# Patient Record
Sex: FEMALE | Race: WHITE | NOT HISPANIC OR LATINO | Employment: FULL TIME | URBAN - METROPOLITAN AREA
[De-identification: names, ages, dates, MRNs, and addresses within clinical notes are randomized per-mention and may not be internally consistent; named-entity substitution may affect disease eponyms.]

---

## 2017-04-05 ENCOUNTER — HOSPITAL ENCOUNTER (OUTPATIENT)
Dept: RADIOLOGY | Facility: OTHER | Age: 47
Discharge: HOME | End: 2017-04-05
Attending: OBSTETRICS & GYNECOLOGY
Payer: COMMERCIAL

## 2017-04-05 DIAGNOSIS — Z12.31 OTHER SCREENING MAMMOGRAM: ICD-10-CM

## 2017-04-05 PROCEDURE — 77067 SCR MAMMO BI INCL CAD: CPT | Mod: TC

## 2017-04-05 PROCEDURE — 77067 SCR MAMMO BI INCL CAD: CPT | Mod: 26,,, | Performed by: INTERNAL MEDICINE

## 2018-07-17 ENCOUNTER — HOSPITAL ENCOUNTER (OUTPATIENT)
Dept: RADIOLOGY | Facility: OTHER | Age: 48
Discharge: HOME | End: 2018-07-17
Attending: OBSTETRICS & GYNECOLOGY
Payer: COMMERCIAL

## 2018-07-17 VITALS — WEIGHT: 232 LBS | HEIGHT: 72 IN | BODY MASS INDEX: 31.42 KG/M2

## 2018-07-17 DIAGNOSIS — Z12.31 VISIT FOR SCREENING MAMMOGRAM: ICD-10-CM

## 2018-07-17 PROCEDURE — 77067 SCR MAMMO BI INCL CAD: CPT | Mod: 26,,, | Performed by: RADIOLOGY

## 2018-07-17 PROCEDURE — 77067 SCR MAMMO BI INCL CAD: CPT | Mod: TC

## 2019-03-15 ENCOUNTER — HOSPITAL ENCOUNTER (OUTPATIENT)
Dept: RADIOLOGY | Facility: OTHER | Age: 49
Discharge: HOME | End: 2019-03-15
Attending: OBSTETRICS & GYNECOLOGY
Payer: COMMERCIAL

## 2019-03-15 DIAGNOSIS — Z12.39 SCREENING BREAST EXAMINATION: ICD-10-CM

## 2019-03-15 DIAGNOSIS — Z97.5 IUD (INTRAUTERINE DEVICE) IN PLACE: ICD-10-CM

## 2019-03-15 DIAGNOSIS — R10.9 ABDOMINAL DISCOMFORT: Primary | ICD-10-CM

## 2019-03-15 DIAGNOSIS — R10.9 ABDOMINAL DISCOMFORT: ICD-10-CM

## 2019-03-15 LAB — HPV RNA, HR E6/E7, TMA: NOT DETECTED

## 2019-03-15 PROCEDURE — 76830 US PELVIS COMP WITH TRANSVAG NON-OB (XPD): ICD-10-PCS | Mod: 26,,, | Performed by: RADIOLOGY

## 2019-03-15 PROCEDURE — 76830 TRANSVAGINAL US NON-OB: CPT | Mod: 26,,, | Performed by: RADIOLOGY

## 2019-03-15 PROCEDURE — 76856 US EXAM PELVIC COMPLETE: CPT | Mod: 26,,, | Performed by: RADIOLOGY

## 2019-03-15 PROCEDURE — 76830 TRANSVAGINAL US NON-OB: CPT | Mod: TC

## 2019-03-15 PROCEDURE — 76856 US PELVIS COMP WITH TRANSVAG NON-OB (XPD): ICD-10-PCS | Mod: 26,,, | Performed by: RADIOLOGY

## 2019-07-18 ENCOUNTER — HOSPITAL ENCOUNTER (OUTPATIENT)
Dept: RADIOLOGY | Facility: OTHER | Age: 49
Discharge: HOME | End: 2019-07-18
Attending: OBSTETRICS & GYNECOLOGY
Payer: COMMERCIAL

## 2019-07-18 VITALS — HEIGHT: 72 IN | WEIGHT: 232 LBS | BODY MASS INDEX: 31.42 KG/M2

## 2019-07-18 DIAGNOSIS — Z13.820 OSTEOPOROSIS SCREENING: ICD-10-CM

## 2019-07-18 DIAGNOSIS — Z12.39 SCREENING BREAST EXAMINATION: ICD-10-CM

## 2019-07-18 DIAGNOSIS — Z12.39 SCREENING BREAST EXAMINATION: Primary | ICD-10-CM

## 2019-07-18 PROCEDURE — 77063 BREAST TOMOSYNTHESIS BI: CPT | Mod: 26,,, | Performed by: RADIOLOGY

## 2019-07-18 PROCEDURE — 77067 SCR MAMMO BI INCL CAD: CPT | Mod: 26,,, | Performed by: RADIOLOGY

## 2019-07-18 PROCEDURE — 77080 DEXA BONE DENSITY SPINE HIP: ICD-10-PCS | Mod: 26,,, | Performed by: RADIOLOGY

## 2019-07-18 PROCEDURE — 77067 SCR MAMMO BI INCL CAD: CPT | Mod: TC

## 2019-07-18 PROCEDURE — 77067 MAMMO DIGITAL SCREENING BILAT WITH TOMOSYNTHESIS_CAD: ICD-10-PCS | Mod: 26,,, | Performed by: RADIOLOGY

## 2019-07-18 PROCEDURE — 77063 MAMMO DIGITAL SCREENING BILAT WITH TOMOSYNTHESIS_CAD: ICD-10-PCS | Mod: 26,,, | Performed by: RADIOLOGY

## 2019-07-18 PROCEDURE — 77080 DXA BONE DENSITY AXIAL: CPT | Mod: TC

## 2019-07-18 PROCEDURE — 77080 DXA BONE DENSITY AXIAL: CPT | Mod: 26,,, | Performed by: RADIOLOGY

## 2019-10-04 ENCOUNTER — OFFICE VISIT (OUTPATIENT)
Dept: PRIMARY CARE CLINIC | Facility: CLINIC | Age: 49
End: 2019-10-04
Payer: COMMERCIAL

## 2019-10-04 VITALS
SYSTOLIC BLOOD PRESSURE: 126 MMHG | DIASTOLIC BLOOD PRESSURE: 86 MMHG | WEIGHT: 249.31 LBS | HEIGHT: 72 IN | TEMPERATURE: 99 F | BODY MASS INDEX: 33.77 KG/M2 | HEART RATE: 83 BPM | OXYGEN SATURATION: 97 %

## 2019-10-04 DIAGNOSIS — Z23 NEED FOR PROPHYLACTIC VACCINATION AND INOCULATION AGAINST INFLUENZA: ICD-10-CM

## 2019-10-04 DIAGNOSIS — E55.9 VITAMIN D DEFICIENCY: ICD-10-CM

## 2019-10-04 DIAGNOSIS — I10 ESSENTIAL HYPERTENSION: Primary | ICD-10-CM

## 2019-10-04 DIAGNOSIS — E78.2 MIXED HYPERLIPIDEMIA: ICD-10-CM

## 2019-10-04 DIAGNOSIS — E04.1 THYROID NODULE: ICD-10-CM

## 2019-10-04 DIAGNOSIS — Z00.00 ANNUAL PHYSICAL EXAM: ICD-10-CM

## 2019-10-04 PROCEDURE — 90686 FLU VACCINE (QUAD) GREATER THAN OR EQUAL TO 3YO PRESERVATIVE FREE IM: ICD-10-PCS | Mod: S$GLB,,, | Performed by: INTERNAL MEDICINE

## 2019-10-04 PROCEDURE — 99999 PR PBB SHADOW E&M-EST. PATIENT-LVL III: CPT | Mod: PBBFAC,,, | Performed by: INTERNAL MEDICINE

## 2019-10-04 PROCEDURE — 99396 PREV VISIT EST AGE 40-64: CPT | Mod: 25,S$GLB,, | Performed by: INTERNAL MEDICINE

## 2019-10-04 PROCEDURE — 90715 TDAP VACCINE GREATER THAN OR EQUAL TO 7YO IM: ICD-10-PCS | Mod: S$GLB,,, | Performed by: INTERNAL MEDICINE

## 2019-10-04 PROCEDURE — 90471 IMMUNIZATION ADMIN: CPT | Mod: S$GLB,,, | Performed by: INTERNAL MEDICINE

## 2019-10-04 PROCEDURE — 90472 IMMUNIZATION ADMIN EACH ADD: CPT | Mod: S$GLB,,, | Performed by: INTERNAL MEDICINE

## 2019-10-04 PROCEDURE — 99999 PR PBB SHADOW E&M-EST. PATIENT-LVL III: ICD-10-PCS | Mod: PBBFAC,,, | Performed by: INTERNAL MEDICINE

## 2019-10-04 PROCEDURE — 90686 IIV4 VACC NO PRSV 0.5 ML IM: CPT | Mod: S$GLB,,, | Performed by: INTERNAL MEDICINE

## 2019-10-04 PROCEDURE — 99396 PR PREVENTIVE VISIT,EST,40-64: ICD-10-PCS | Mod: 25,S$GLB,, | Performed by: INTERNAL MEDICINE

## 2019-10-04 PROCEDURE — 90471 FLU VACCINE (QUAD) GREATER THAN OR EQUAL TO 3YO PRESERVATIVE FREE IM: ICD-10-PCS | Mod: S$GLB,,, | Performed by: INTERNAL MEDICINE

## 2019-10-04 PROCEDURE — 90715 TDAP VACCINE 7 YRS/> IM: CPT | Mod: S$GLB,,, | Performed by: INTERNAL MEDICINE

## 2019-10-04 PROCEDURE — 90472 TDAP VACCINE GREATER THAN OR EQUAL TO 7YO IM: ICD-10-PCS | Mod: S$GLB,,, | Performed by: INTERNAL MEDICINE

## 2019-10-04 RX ORDER — ACETAMINOPHEN 160 MG/5ML
200 SUSPENSION, ORAL (FINAL DOSE FORM) ORAL DAILY
COMMUNITY
End: 2022-10-13 | Stop reason: CLARIF

## 2019-10-04 RX ORDER — GLUCOSAMINE/CHONDR SU A SOD 750-600 MG
TABLET ORAL
COMMUNITY
End: 2020-12-14

## 2019-10-04 RX ORDER — VITAMIN B COMPLEX
1 CAPSULE ORAL DAILY
COMMUNITY
End: 2022-10-13 | Stop reason: CLARIF

## 2019-10-04 RX ORDER — LISINOPRIL 20 MG/1
TABLET ORAL
Refills: 2 | COMMUNITY
Start: 2019-07-03 | End: 2019-10-09 | Stop reason: SDUPTHER

## 2019-10-04 NOTE — PROGRESS NOTES
[facility] Note    Chief Complaint      Chief Complaint   Patient presents with    Annual Exam       History of Present Illness      Nell Sanches is a 49 y.o. female with chronic conditions of HTN, HLD, anxiety, thyroid nodule, vit d deficiency who presents today for: re-establish care from  and annual preventative visit.    HTN: BP at [facility] on lisinopril.  HLD: Controlled with diet.  Update labs.    Anxiety: Controlled without medications.  Stretching and exercising.   Thyroid nodule: Due for repeat ultrasound.    Diet: Prepares own food mostly.  Limiting fatty foods and carbs.  Drinks plenty water.  Exercise: Walks regularly but wants to start more resistance.    Denies drinking and driving, drinking more than 4 drinks on occasion, drug use.       Past Medical History:  Past Medical History:   Diagnosis Date    Abnormal weight gain 10/2/2012    Other and unspecified hyperlipidemia 10/2/2012    Unspecified vitamin D deficiency 10/2/2012       Past Surgical History:   has a past surgical history that includes Breast biopsy (Right, 2012).    Family History:  family history includes Colon cancer (age of onset: 70) in her maternal grandmother.     Social History:  Social History     Tobacco Use    Smoking status: Never Smoker   Substance Use Topics    Alcohol use: Not on file    Drug use: Not on file       Review of Systems   Constitutional: Negative for chills, fever and malaise/fatigue.   Respiratory: Negative for shortness of breath.    Cardiovascular: Negative for chest pain.   Gastrointestinal: Negative for constipation, diarrhea, nausea and vomiting.   Skin: Negative for rash.   Neurological: Negative for weakness.        Medications:  Outpatient Encounter Medications as of 10/4/2019   Medication Sig Note Dispense Refill    a-cysteine/arg zinc/glut/mv-mn (L GLUTAMINE-N ACET-ARG-VIT-MIN ORAL) Take 500 mg by mouth.       b complex vitamins capsule Take 1 capsule by mouth once  daily.       biotin 2,500 mcg Cap Take by mouth.       Ca/D3/mag ox/zinc//nancy/bor (CALCIUM 600-D3 PLUS ORAL) Take by mouth.       coenzyme Q10 200 mg capsule Take 200 mg by mouth once daily.       docosahexanoic acid/epa (FISH OIL ORAL) Take by mouth.       levocarnitine HCl (ACETYL-L-CARNITINE MISC) 100 mg by Misc.(Non-Drug; Combo Route) route.       lisinopril (PRINIVIL,ZESTRIL) 20 MG tablet TK 1 T PO D   2    vit A/vit C/vit E/zinc/copper (ICAPS AREDS ORAL) Take by mouth.       ciprofloxacin (CIPRO) 250 MG tablet        ergocalciferol (ERGOCALCIFEROL) 50,000 unit Cap TAKE 1 CAPSULE BY MOUTH TWICE A WEEK  25 capsule 3    lorazepam (ATIVAN) 0.5 MG tablet TAKE 1/2 - 1 TABLET BY MOUTH EVERY DAY AS NEEDED (Patient not taking: Reported on 10/4/2019)  30 tablet 1    phenazopyridine (PYRIDIUM) 200 MG tablet        phentermine (ADIPEX-P) 37.5 mg tablet TAKE 1/2 - 1 TABLET BY MOUTH EVERY MORNING AS DIRECTED (Patient not taking: Reported on 10/4/2019)  30 tablet 1    topiramate (TOPAMAX) 50 MG tablet TAKE 1 TABLET BY MOUTH EVERY NIGHT AT BEDTIME (Patient not taking: Reported on 10/4/2019)  90 tablet 3    URIBEL 118-10-40.8-36 mg Cap        [DISCONTINUED] OPW TEST CLAIM - DO NOT FILL Inject 0.5 tablets into the muscle once. OPW test claim. Do not fill. for 1 dose 10/4/2019: test 1 tablet 0    [DISCONTINUED] OPW TEST CLAIM - DO NOT FILL Inject 0.5 tablets into the muscle once. OPW test claim. Do not fill. for 1 dose 10/4/2019: test 1 tablet 0     No facility-administered encounter medications on file as of 10/4/2019.        Allergies:  Review of patient's allergies indicates:   Allergen Reactions    Sulfa (sulfonamide antibiotics) Hives       Health Maintenance:  Immunization History   Administered Date(s) Administered    Influenza - Trivalent - PF (ADULT) 10/04/2012      Health Maintenance   Topic Date Due    TETANUS VACCINE  09/09/1987    Lipid Panel  04/11/2014    Pap Smear with HPV Cotest   03/07/2019    Colonoscopy  09/09/2019    Mammogram  07/18/2020      Flu shot due.  TdAP due today.    Mammogram UTD.  DEXA UTD 2019.  Dr. Gustafson.  PAP smear UTD.   Cscope 2019 Dr. Salgado, polyp, 5 yr interval.    Physical Exam      Vital Signs  Temp: 98.5 °F (36.9 °C)  Temp src: Oral  Pulse: 83  SpO2: 97 %  BP: 126/86  BP Location: Left arm  Patient Position: Sitting  Height and Weight  Height: 6' (182.9 cm)  Weight: 113.1 kg (249 lb 5.4 oz)  BSA (Calculated - sq m): 2.4 sq meters  BMI (Calculated): 33.9  Weight in (lb) to have BMI = 25: 183.9]    Physical Exam   Constitutional: She appears well-developed and well-nourished.   HENT:   Head: Normocephalic and atraumatic.   Right Ear: External ear normal.   Left Ear: External ear normal.   Mouth/Throat: Oropharynx is clear and moist.   Eyes: Pupils are equal, round, and reactive to light. Conjunctivae and EOM are normal.   Neck: Carotid bruit is not present.   Cardiovascular: Normal rate, regular rhythm, normal heart sounds and intact distal pulses.   No murmur heard.  Pulmonary/Chest: Effort normal and breath sounds normal. She has no wheezes. She has no rales.   Abdominal: Soft. Bowel sounds are normal. She exhibits no distension. There is no hepatosplenomegaly. There is no tenderness.   Musculoskeletal: She exhibits no edema.   Vitals reviewed.       Laboratory:  CBC:      CMP:        Invalid input(s): CREATININ  URINALYSIS:       LIPIDS:      TSH:      A1C:        Assessment/Plan     Nell Sanches is a 49 y.o.female with:    1. Annual physical exam  - CBC auto differential; Future  - Comprehensive metabolic panel; Future  - Lipid panel; Future  - TSH; Future  - T4, free; Future    2. Essential hypertension  - CBC auto differential; Future  - Comprehensive metabolic panel; Future  - Lipid panel; Future  - TSH; Future  - T4, free; Future    3. Mixed hyperlipidemia  - CBC auto differential; Future  - Comprehensive metabolic panel; Future  - Lipid panel;  Future  - TSH; Future  - T4, free; Future    4. Vitamin D deficiency    5. Thyroid nodule  - US Soft Tissue Head Neck Thyroid; Future  - US Soft Tissue Head Neck Thyroid       Chronic conditions status updated as per HPI.  Other than changes above, cont current medications and maintain follow up with specialists.  Return to clinic in 6 months.    Juanito Cabral MD  [facility]                Flu consent signed by patient. Flu vaccine administered.

## 2019-10-09 RX ORDER — LISINOPRIL 20 MG/1
20 TABLET ORAL DAILY
Qty: 30 TABLET | Refills: 3 | Status: SHIPPED | OUTPATIENT
Start: 2019-10-09 | End: 2020-02-04 | Stop reason: SDUPTHER

## 2019-10-09 NOTE — TELEPHONE ENCOUNTER
----- Message from Susannah Simeon sent at 10/9/2019  2:30 PM CDT -----  Contact: [phone]  Type: Rx    Name of medication(s): lisinopril (PRINIVIL,ZESTRIL) 20 MG tablet    Is this a refill? New rx? New     Pharmacy Name, Phone, & Location[id number] [address] [place] [address] AT [facility]   [phone] (Phone)  [phone] (Fax)        Comments:please advise, thanks

## 2019-10-21 ENCOUNTER — PATIENT OUTREACH (OUTPATIENT)
Dept: ADMINISTRATIVE | Facility: HOSPITAL | Age: 49
End: 2019-10-21

## 2019-10-30 LAB
ALBUMIN SERPL-MCNC: 4 G/DL (ref 3.6–5.1)
ALBUMIN/GLOB SERPL: 1.4 (CALC) (ref 1–2.5)
ALP SERPL-CCNC: 51 U/L (ref 33–130)
ALT SERPL-CCNC: 15 U/L (ref 6–29)
AST SERPL-CCNC: 16 U/L (ref 10–35)
BASOPHILS # BLD AUTO: 38 CELLS/UL (ref 0–200)
BASOPHILS NFR BLD AUTO: 0.6 %
BILIRUB SERPL-MCNC: 0.8 MG/DL (ref 0.2–1.2)
BUN SERPL-MCNC: 14 MG/DL (ref 7–25)
BUN/CREAT SERPL: NORMAL (CALC) (ref 6–22)
CALCIUM SERPL-MCNC: 9.3 MG/DL (ref 8.6–10.4)
CHLORIDE SERPL-SCNC: 104 MMOL/L (ref 98–110)
CHOLEST SERPL-MCNC: 165 MG/DL
CHOLEST/HDLC SERPL: 3.4 (CALC)
CO2 SERPL-SCNC: 30 MMOL/L (ref 20–32)
CREAT SERPL-MCNC: 0.77 MG/DL (ref 0.5–1.05)
EOSINOPHIL # BLD AUTO: 170 CELLS/UL (ref 15–500)
EOSINOPHIL NFR BLD AUTO: 2.7 %
ERYTHROCYTE [DISTWIDTH] IN BLOOD BY AUTOMATED COUNT: 12.2 % (ref 11–15)
GFRSERPLBLD MDRD-ARVRAT: 90 ML/MIN/1.73M2
GLOBULIN SER CALC-MCNC: 2.8 G/DL (CALC) (ref 1.9–3.7)
GLUCOSE SERPL-MCNC: 86 MG/DL (ref 65–99)
HCT VFR BLD AUTO: 41.2 % (ref 35–45)
HDLC SERPL-MCNC: 49 MG/DL
HGB BLD-MCNC: 14 G/DL (ref 11.7–15.5)
LDLC SERPL CALC-MCNC: 97 MG/DL (CALC)
LYMPHOCYTES # BLD AUTO: 1840 CELLS/UL (ref 850–3900)
LYMPHOCYTES NFR BLD AUTO: 29.2 %
MCH RBC QN AUTO: 30 PG (ref 27–33)
MCHC RBC AUTO-ENTMCNC: 34 G/DL (ref 32–36)
MCV RBC AUTO: 88.4 FL (ref 80–100)
MONOCYTES # BLD AUTO: 510 CELLS/UL (ref 200–950)
MONOCYTES NFR BLD AUTO: 8.1 %
NEUTROPHILS # BLD AUTO: 3742 CELLS/UL (ref 1500–7800)
NEUTROPHILS NFR BLD AUTO: 59.4 %
NONHDLC SERPL-MCNC: 116 MG/DL (CALC)
PLATELET # BLD AUTO: 304 THOUSAND/UL (ref 140–400)
PMV BLD REES-ECKER: 10.7 FL (ref 7.5–12.5)
POTASSIUM SERPL-SCNC: 4.7 MMOL/L (ref 3.5–5.3)
PROT SERPL-MCNC: 6.8 G/DL (ref 6.1–8.1)
RBC # BLD AUTO: 4.66 MILLION/UL (ref 3.8–5.1)
SODIUM SERPL-SCNC: 140 MMOL/L (ref 135–146)
T4 FREE SERPL-MCNC: 1.2 NG/DL (ref 0.8–1.8)
TRIGL SERPL-MCNC: 91 MG/DL
TSH SERPL-ACNC: 2.75 MIU/L
WBC # BLD AUTO: 6.3 THOUSAND/UL (ref 3.8–10.8)

## 2020-02-04 RX ORDER — LISINOPRIL 20 MG/1
20 TABLET ORAL DAILY
Qty: 90 TABLET | Refills: 3 | Status: SHIPPED | OUTPATIENT
Start: 2020-02-04 | End: 2021-01-20 | Stop reason: SDUPTHER

## 2020-02-04 NOTE — TELEPHONE ENCOUNTER
Patient need refills on Lisinopril sent to [facility], Also said you were going to speak to someone you knew regarding her Gluten???, Also looking for U/S results scanned in

## 2020-09-04 DIAGNOSIS — Z12.39 BREAST CANCER SCREENING: ICD-10-CM

## 2020-10-12 ENCOUNTER — TELEPHONE (OUTPATIENT)
Dept: FAMILY MEDICINE | Facility: CLINIC | Age: 50
End: 2020-10-12

## 2020-10-30 ENCOUNTER — TELEPHONE (OUTPATIENT)
Dept: ADMINISTRATIVE | Facility: HOSPITAL | Age: 50
End: 2020-10-30

## 2020-10-30 ENCOUNTER — OFFICE VISIT (OUTPATIENT)
Dept: PRIMARY CARE CLINIC | Facility: CLINIC | Age: 50
End: 2020-10-30
Payer: COMMERCIAL

## 2020-10-30 VITALS
WEIGHT: 257.94 LBS | OXYGEN SATURATION: 99 % | BODY MASS INDEX: 34.94 KG/M2 | DIASTOLIC BLOOD PRESSURE: 88 MMHG | SYSTOLIC BLOOD PRESSURE: 130 MMHG | HEIGHT: 72 IN | HEART RATE: 70 BPM

## 2020-10-30 DIAGNOSIS — E78.2 MIXED HYPERLIPIDEMIA: ICD-10-CM

## 2020-10-30 DIAGNOSIS — E04.1 THYROID NODULE: ICD-10-CM

## 2020-10-30 DIAGNOSIS — Z11.4 SCREENING FOR HIV (HUMAN IMMUNODEFICIENCY VIRUS): ICD-10-CM

## 2020-10-30 DIAGNOSIS — I10 ESSENTIAL HYPERTENSION: ICD-10-CM

## 2020-10-30 DIAGNOSIS — Z11.59 SCREENING FOR VIRAL DISEASE: ICD-10-CM

## 2020-10-30 DIAGNOSIS — Z00.00 ANNUAL PHYSICAL EXAM: Primary | ICD-10-CM

## 2020-10-30 DIAGNOSIS — E55.9 VITAMIN D DEFICIENCY: ICD-10-CM

## 2020-10-30 DIAGNOSIS — R73.01 IMPAIRED FASTING GLUCOSE: ICD-10-CM

## 2020-10-30 PROCEDURE — 99396 PREV VISIT EST AGE 40-64: CPT | Mod: S$GLB,,, | Performed by: INTERNAL MEDICINE

## 2020-10-30 PROCEDURE — 3079F DIAST BP 80-89 MM HG: CPT | Mod: CPTII,S$GLB,, | Performed by: INTERNAL MEDICINE

## 2020-10-30 PROCEDURE — 99396 PR PREVENTIVE VISIT,EST,40-64: ICD-10-PCS | Mod: S$GLB,,, | Performed by: INTERNAL MEDICINE

## 2020-10-30 PROCEDURE — 3008F BODY MASS INDEX DOCD: CPT | Mod: CPTII,S$GLB,, | Performed by: INTERNAL MEDICINE

## 2020-10-30 PROCEDURE — 3075F SYST BP GE 130 - 139MM HG: CPT | Mod: CPTII,S$GLB,, | Performed by: INTERNAL MEDICINE

## 2020-10-30 PROCEDURE — 99999 PR PBB SHADOW E&M-EST. PATIENT-LVL III: ICD-10-PCS | Mod: PBBFAC,,, | Performed by: INTERNAL MEDICINE

## 2020-10-30 PROCEDURE — 3079F PR MOST RECENT DIASTOLIC BLOOD PRESSURE 80-89 MM HG: ICD-10-PCS | Mod: CPTII,S$GLB,, | Performed by: INTERNAL MEDICINE

## 2020-10-30 PROCEDURE — 3008F PR BODY MASS INDEX (BMI) DOCUMENTED: ICD-10-PCS | Mod: CPTII,S$GLB,, | Performed by: INTERNAL MEDICINE

## 2020-10-30 PROCEDURE — 3075F PR MOST RECENT SYSTOLIC BLOOD PRESS GE 130-139MM HG: ICD-10-PCS | Mod: CPTII,S$GLB,, | Performed by: INTERNAL MEDICINE

## 2020-10-30 PROCEDURE — 99999 PR PBB SHADOW E&M-EST. PATIENT-LVL III: CPT | Mod: PBBFAC,,, | Performed by: INTERNAL MEDICINE

## 2020-10-30 NOTE — PROGRESS NOTES
[facility] Note    Chief Complaint      Chief Complaint   Patient presents with    Annual Exam       History of Present Illness      Nell Sanches is a 50 y.o. female with chronic conditions of HTN, HLD, anxiety, thyroid nodule, vit d deficiency who presents today for: annual preventative visit.  HTN: BP at goal on lisinopril.  HLD: Controlled with diet.  Update labs.    Anxiety: Controlled without medications.  Stretching and exercising.   Thyroid nodule: Due for repeat ultrasound.    Diet: Prepares own food mostly.  Limiting fatty foods and carbs.  Drinks plenty water.  Exercise: Walks regularly but wants to start more resistance.    Denies drinking and driving, drinking more than 4 drinks on occasion, drug use.   Flu shot 2020.  TdAP 2020    Mammogram UTD.  DEXA UTD 2019.  Dr. Gustafson.  PAP smear UTD.   Cscope 2019 Dr. Salgado, polyp, 5 yr interval.    Past Medical History:  Past Medical History:   Diagnosis Date    Abnormal weight gain 10/2/2012    Other and unspecified hyperlipidemia 10/2/2012    Unspecified vitamin D deficiency 10/2/2012       Past Surgical History:   has a past surgical history that includes Breast biopsy (Right, 2012).    Family History:  family history includes Colon cancer (age of onset: 70) in her maternal grandmother.     Social History:  Social History     Tobacco Use    Smoking status: Never Smoker    Smokeless tobacco: Never Used   Substance Use Topics    Alcohol use: Not on file    Drug use: Not on file       I personally reviewed all past medical, surgical, social and family history.    Review of Systems   Constitutional: Negative for chills, fever and malaise/fatigue.   Respiratory: Negative for shortness of breath.    Cardiovascular: Negative for chest pain.   Gastrointestinal: Negative for constipation, diarrhea, nausea and vomiting.   Skin: Negative for rash.   Neurological: Negative for weakness.   All other systems reviewed and are  negative.       Medications:  Outpatient Encounter Medications as of 10/30/2020   Medication Sig Dispense Refill    a-cysteine/arg zinc/glut/mv-mn (L GLUTAMINE-N ACET-ARG-VIT-MIN ORAL) Take 500 mg by mouth.      b complex vitamins capsule Take 1 capsule by mouth once daily.      biotin 2,500 mcg Cap Take by mouth.      Ca/D3/mag ox/zinc//nancy/bor (CALCIUM 600-D3 PLUS ORAL) Take by mouth.      coenzyme Q10 200 mg capsule Take 200 mg by mouth once daily.      docosahexanoic acid/epa (FISH OIL ORAL) Take by mouth.      lisinopril (PRINIVIL,ZESTRIL) 20 MG tablet Take 1 tablet (20 mg total) by mouth once daily. 90 tablet 3    lorazepam (ATIVAN) 0.5 MG tablet TAKE 1/2 - 1 TABLET BY MOUTH EVERY DAY AS NEEDED 30 tablet 1    vit A/vit C/vit E/zinc/copper (ICAPS AREDS ORAL) Take by mouth.      phentermine (ADIPEX-P) 37.5 mg tablet TAKE 1/2 - 1 TABLET BY MOUTH EVERY MORNING AS DIRECTED (Patient not taking: Reported on 10/4/2019) 30 tablet 1    [DISCONTINUED] ciprofloxacin (CIPRO) 250 MG tablet       [DISCONTINUED] ergocalciferol (ERGOCALCIFEROL) 50,000 unit Cap TAKE 1 CAPSULE BY MOUTH TWICE A WEEK 25 capsule 3    [DISCONTINUED] levocarnitine HCl (ACETYL-L-CARNITINE MISC) 100 mg by Misc.(Non-Drug; Combo Route) route.      [DISCONTINUED] phenazopyridine (PYRIDIUM) 200 MG tablet       [DISCONTINUED] topiramate (TOPAMAX) 50 MG tablet TAKE 1 TABLET BY MOUTH EVERY NIGHT AT BEDTIME (Patient not taking: Reported on 10/4/2019) 90 tablet 3    [DISCONTINUED] URIBEL 118-10-40.8-36 mg Cap        No facility-administered encounter medications on file as of 10/30/2020.        Allergies:  Review of patient's allergies indicates:   Allergen Reactions    Sulfa (sulfonamide antibiotics) Hives       Health Maintenance:  Immunization History   Administered Date(s) Administered    Influenza 10/04/2012    Influenza - Quadrivalent - MDCK - PF 10/10/2018    Influenza - Quadrivalent - PF *Preferred* (6 months and older)  10/04/2019, 10/08/2020    Influenza - Trivalent - PF (ADULT) 10/04/2012    Influenza Split 12/06/2017    Tdap 10/04/2019      Health Maintenance   Topic Date Due    Hepatitis C Screening  1970    Mammogram  07/18/2020    Lipid Panel  10/29/2020    TETANUS VACCINE  10/04/2029        Physical Exam      Vital Signs  Pulse: 70  SpO2: 99 %  BP: 130/88  BP Location: Right arm  Height and Weight  Height: 6' (182.9 cm)  Weight: 117 kg (257 lb 15 oz)  BSA (Calculated - sq m): 2.44 sq meters  BMI (Calculated): 35  Weight in (lb) to have BMI = 25: 183.9]    Physical Exam  Vitals signs reviewed.   Constitutional:       Appearance: She is well-developed.   HENT:      Head: Normocephalic and atraumatic.      Right Ear: External ear normal.      Left Ear: External ear normal.   Eyes:      Conjunctiva/sclera: Conjunctivae normal.      Pupils: Pupils are equal, round, and reactive to light.   Neck:      Vascular: No carotid bruit.   Cardiovascular:      Rate and Rhythm: Normal rate and regular rhythm.      Heart sounds: Normal heart sounds. No murmur.   Pulmonary:      Effort: Pulmonary effort is normal.      Breath sounds: Normal breath sounds. No wheezing or rales.   Abdominal:      General: Bowel sounds are normal. There is no distension.      Palpations: Abdomen is soft.      Tenderness: There is no abdominal tenderness.          Laboratory:  CBC:  Recent Labs   Lab 10/29/19  0742   WBC 6.3   RBC 4.66   Hemoglobin 14.0   Hematocrit 41.2   Platelets 304   MCV 88.4   MCH 30.0   MCHC 34.0     CMP:  Recent Labs   Lab 10/29/19  0742   Glucose 86   Calcium 9.3   Albumin 4.0   Total Protein 6.8   Sodium 140   Potassium 4.7   CO2 30   Chloride 104   BUN 14   Alkaline Phosphatase 51   ALT 15   AST 16   Total Bilirubin 0.8     URINALYSIS:       LIPIDS:  Recent Labs   Lab 10/29/19  0742   TSH 2.75   HDL 49 L   Cholesterol 165   Triglycerides 91   LDL Cholesterol 97   HDL/Cholesterol Ratio 3.4   Non HDL Chol. (LDL+VLDL) 116      TSH:  Recent Labs   Lab 10/29/19  0742   TSH 2.75     A1C:        Assessment/Plan     Nell Sanches is a 50 y.o.female with:    1. Annual physical exam  - CBC Auto Differential; Future  - Comprehensive Metabolic Panel; Future  - Hepatitis C Antibody; Future  - HIV 1/2 Ag/Ab (4th Gen); Future  - Lipid Panel; Future  - T4, Free; Future  - TSH; Future  - Vitamin D; Future  - Hemoglobin A1C; Future  Discussed diet and exercise, vaccines and cancer screening, risk factors.  Screening labs ordered.     2. Essential hypertension  Continue current meds.    3. Mixed hyperlipidemia  Continue current meds.  Update labs.  4. Vitamin D deficiency  - Vitamin D; Future  .  5. Thyroid nodule  - T4, Free; Future  - TSH; Future    6. Screening for viral disease  - Hepatitis C Antibody; Future    7. Screening for HIV (human immunodeficiency virus)  - HIV 1/2 Ag/Ab (4th Gen); Future    8. Impaired fasting glucose  - Hemoglobin A1C; Future       Chronic conditions status updated as per HPI.  Other than changes above, cont current medications and maintain follow up with specialists.  Return to clinic in 12 months.    Juanito Cabral MD  [facility]

## 2020-11-05 ENCOUNTER — HOSPITAL ENCOUNTER (OUTPATIENT)
Dept: RADIOLOGY | Facility: OTHER | Age: 50
Discharge: HOME | End: 2020-11-05
Attending: INTERNAL MEDICINE
Payer: COMMERCIAL

## 2020-11-05 ENCOUNTER — TELEPHONE (OUTPATIENT)
Dept: FAMILY MEDICINE | Facility: CLINIC | Age: 50
End: 2020-11-05

## 2020-11-05 DIAGNOSIS — Z12.39 BREAST CANCER SCREENING: ICD-10-CM

## 2020-11-05 PROCEDURE — 77067 SCR MAMMO BI INCL CAD: CPT | Mod: TC

## 2020-11-05 PROCEDURE — 77063 MAMMO DIGITAL SCREENING BILAT WITH TOMO: ICD-10-PCS | Mod: 26,,, | Performed by: RADIOLOGY

## 2020-11-05 PROCEDURE — 77063 BREAST TOMOSYNTHESIS BI: CPT | Mod: 26,,, | Performed by: RADIOLOGY

## 2020-11-05 PROCEDURE — 77067 SCR MAMMO BI INCL CAD: CPT | Mod: 26,,, | Performed by: RADIOLOGY

## 2020-11-05 PROCEDURE — 77067 MAMMO DIGITAL SCREENING BILAT WITH TOMO: ICD-10-PCS | Mod: 26,,, | Performed by: RADIOLOGY

## 2020-11-13 ENCOUNTER — TELEPHONE (OUTPATIENT)
Dept: PRIMARY CARE CLINIC | Facility: CLINIC | Age: 50
End: 2020-11-13

## 2020-11-13 NOTE — TELEPHONE ENCOUNTER
----- Message from Queenie Barnes sent at 11/13/2020  1:39 PM CST -----  Contact: Patient [phone]  Patient was prescribed Rx Meloxicam by Ortho and wants to know if she can take it.    Please call and advise.    Thank You

## 2020-11-24 ENCOUNTER — TELEPHONE (OUTPATIENT)
Dept: ADMINISTRATIVE | Facility: HOSPITAL | Age: 50
End: 2020-11-24

## 2020-11-24 ENCOUNTER — PATIENT OUTREACH (OUTPATIENT)
Dept: ADMINISTRATIVE | Facility: HOSPITAL | Age: 50
End: 2020-11-24

## 2020-12-14 ENCOUNTER — OFFICE VISIT (OUTPATIENT)
Dept: PRIMARY CARE CLINIC | Facility: CLINIC | Age: 50
End: 2020-12-14
Payer: COMMERCIAL

## 2020-12-14 VITALS
SYSTOLIC BLOOD PRESSURE: 136 MMHG | OXYGEN SATURATION: 99 % | BODY MASS INDEX: 34.28 KG/M2 | HEART RATE: 71 BPM | DIASTOLIC BLOOD PRESSURE: 88 MMHG | WEIGHT: 253.06 LBS | HEIGHT: 72 IN

## 2020-12-14 DIAGNOSIS — E04.1 THYROID NODULE: ICD-10-CM

## 2020-12-14 DIAGNOSIS — Z00.00 ANNUAL PHYSICAL EXAM: ICD-10-CM

## 2020-12-14 DIAGNOSIS — E55.9 VITAMIN D DEFICIENCY: ICD-10-CM

## 2020-12-14 DIAGNOSIS — I10 ESSENTIAL HYPERTENSION: Primary | ICD-10-CM

## 2020-12-14 DIAGNOSIS — R73.01 IMPAIRED FASTING GLUCOSE: ICD-10-CM

## 2020-12-14 DIAGNOSIS — E78.2 MIXED HYPERLIPIDEMIA: ICD-10-CM

## 2020-12-14 PROBLEM — F41.9 ANXIETY: Status: ACTIVE | Noted: 2020-12-14

## 2020-12-14 PROCEDURE — 1126F AMNT PAIN NOTED NONE PRSNT: CPT | Mod: S$GLB,,, | Performed by: INTERNAL MEDICINE

## 2020-12-14 PROCEDURE — 99214 PR OFFICE/OUTPT VISIT, EST, LEVL IV, 30-39 MIN: ICD-10-PCS | Mod: S$GLB,,, | Performed by: INTERNAL MEDICINE

## 2020-12-14 PROCEDURE — 1126F PR PAIN SEVERITY QUANTIFIED, NO PAIN PRESENT: ICD-10-PCS | Mod: S$GLB,,, | Performed by: INTERNAL MEDICINE

## 2020-12-14 PROCEDURE — 99999 PR PBB SHADOW E&M-EST. PATIENT-LVL III: ICD-10-PCS | Mod: PBBFAC,,, | Performed by: INTERNAL MEDICINE

## 2020-12-14 PROCEDURE — 99999 PR PBB SHADOW E&M-EST. PATIENT-LVL III: CPT | Mod: PBBFAC,,, | Performed by: INTERNAL MEDICINE

## 2020-12-14 PROCEDURE — 3075F SYST BP GE 130 - 139MM HG: CPT | Mod: CPTII,S$GLB,, | Performed by: INTERNAL MEDICINE

## 2020-12-14 PROCEDURE — 3079F DIAST BP 80-89 MM HG: CPT | Mod: CPTII,S$GLB,, | Performed by: INTERNAL MEDICINE

## 2020-12-14 PROCEDURE — 3008F BODY MASS INDEX DOCD: CPT | Mod: CPTII,S$GLB,, | Performed by: INTERNAL MEDICINE

## 2020-12-14 PROCEDURE — 3079F PR MOST RECENT DIASTOLIC BLOOD PRESSURE 80-89 MM HG: ICD-10-PCS | Mod: CPTII,S$GLB,, | Performed by: INTERNAL MEDICINE

## 2020-12-14 PROCEDURE — 3075F PR MOST RECENT SYSTOLIC BLOOD PRESS GE 130-139MM HG: ICD-10-PCS | Mod: CPTII,S$GLB,, | Performed by: INTERNAL MEDICINE

## 2020-12-14 PROCEDURE — 99214 OFFICE O/P EST MOD 30 MIN: CPT | Mod: S$GLB,,, | Performed by: INTERNAL MEDICINE

## 2020-12-14 PROCEDURE — 3008F PR BODY MASS INDEX (BMI) DOCUMENTED: ICD-10-PCS | Mod: CPTII,S$GLB,, | Performed by: INTERNAL MEDICINE

## 2020-12-14 RX ORDER — MELOXICAM 15 MG/1
15 TABLET ORAL DAILY
COMMUNITY
Start: 2020-12-11 | End: 2023-07-13

## 2020-12-14 NOTE — PROGRESS NOTES
[facility] Note    Chief Complaint      Chief Complaint   Patient presents with    Medication Management       History of Present Illness      Nell Sanches is a 50 y.o. female with chronic conditions of HTN, HLD, anxiety, thyroid nodule, vit d deficiency who presents today for: follow up chronic conditions.  HTN: BP at goal on lisinopril.  Anxiety: Controlled without medications.  Stretching and exercising.   Thyroid nodule: Due for repeat ultrasound.    Flu shot 2020.  TdAP 2020    Mammogram UTD.  DEXA UTD 2019.  Dr. Gustafson.  PAP smear UTD.   Cscope 2019 Dr. Salgado, polyp, 5 yr interval.    Past Medical History:  Past Medical History:   Diagnosis Date    Abnormal weight gain 10/2/2012    Other and unspecified hyperlipidemia 10/2/2012    Unspecified vitamin D deficiency 10/2/2012       Past Surgical History:   has a past surgical history that includes Breast biopsy (Right, 2012).    Family History:  family history includes Colon cancer (age of onset: 70) in her maternal grandmother.     Social History:  Social History     Tobacco Use    Smoking status: Never Smoker    Smokeless tobacco: Never Used   Substance Use Topics    Alcohol use: Not on file    Drug use: Not on file       I personally reviewed all past medical, surgical, social and family history.    Review of Systems   Constitutional: Negative for chills, fever and malaise/fatigue.   Respiratory: Negative for shortness of breath.    Cardiovascular: Negative for chest pain.   Gastrointestinal: Negative for constipation, diarrhea, nausea and vomiting.   Skin: Negative for rash.   Neurological: Negative for weakness.   All other systems reviewed and are negative.       Medications:  Outpatient Encounter Medications as of 12/14/2020   Medication Sig Dispense Refill    b complex vitamins capsule Take 1 capsule by mouth once daily.      Ca/D3/mag ox/zinc//nancy/bor (CALCIUM 600-D3 PLUS ORAL) Take by mouth.      coenzyme  Q10 200 mg capsule Take 200 mg by mouth once daily.      docosahexanoic acid/epa (FISH OIL ORAL) Take by mouth.      lisinopril (PRINIVIL,ZESTRIL) 20 MG tablet Take 1 tablet (20 mg total) by mouth once daily. 90 tablet 3    meloxicam (MOBIC) 15 MG tablet Take 15 mg by mouth once daily.      vit A/vit C/vit E/zinc/copper (ICAPS AREDS ORAL) Take by mouth.      [DISCONTINUED] a-cysteine/arg zinc/glut/mv-mn (L GLUTAMINE-N ACET-ARG-VIT-MIN ORAL) Take 500 mg by mouth.      [DISCONTINUED] biotin 2,500 mcg Cap Take by mouth.      [DISCONTINUED] lorazepam (ATIVAN) 0.5 MG tablet TAKE 1/2 - 1 TABLET BY MOUTH EVERY DAY AS NEEDED 30 tablet 1    [DISCONTINUED] phentermine (ADIPEX-P) 37.5 mg tablet TAKE 1/2 - 1 TABLET BY MOUTH EVERY MORNING AS DIRECTED 30 tablet 1     No facility-administered encounter medications on file as of 12/14/2020.        Allergies:  Review of patient's allergies indicates:   Allergen Reactions    Sulfa (sulfonamide antibiotics) Hives       Health Maintenance:  Immunization History   Administered Date(s) Administered    Influenza 10/04/2012    Influenza - Quadrivalent - MDCK - PF 10/10/2018    Influenza - Quadrivalent - PF *Preferred* (6 months and older) 10/04/2019, 10/08/2020    Influenza - Trivalent - PF (ADULT) 10/04/2012    Influenza Split 12/06/2017    Tdap 10/04/2019      Health Maintenance   Topic Date Due    Hepatitis C Screening  1970    Lipid Panel  10/29/2020    Mammogram  11/05/2021    Pap Smear with HPV Cotest  03/15/2024    TETANUS VACCINE  10/04/2029        Physical Exam      Vital Signs  Pulse: 71  SpO2: 99 %  BP: 136/88  BP Location: Left arm  Pain Score: 0-No pain  Height and Weight  Height: 6' (182.9 cm)  Weight: 114.8 kg (253 lb 1.4 oz)  BSA (Calculated - sq m): 2.41 sq meters  BMI (Calculated): 34.3  Weight in (lb) to have BMI = 25: 183.9]    Physical Exam  Vitals signs reviewed.   Constitutional:       Appearance: She is well-developed.   HENT:      Head:  Normocephalic and atraumatic.      Right Ear: External ear normal.      Left Ear: External ear normal.   Eyes:      Conjunctiva/sclera: Conjunctivae normal.      Pupils: Pupils are equal, round, and reactive to light.   Neck:      Vascular: No carotid bruit.   Cardiovascular:      Rate and Rhythm: Normal rate and regular rhythm.      Heart sounds: Normal heart sounds. No murmur.   Pulmonary:      Effort: Pulmonary effort is normal.      Breath sounds: Normal breath sounds. No wheezing or rales.   Abdominal:      General: Bowel sounds are normal. There is no distension.      Palpations: Abdomen is soft.      Tenderness: There is no abdominal tenderness.          Laboratory:  CBC:  Recent Labs   Lab 10/29/19  0742   WBC 6.3   RBC 4.66   Hemoglobin 14.0   Hematocrit 41.2   Platelets 304   MCV 88.4   MCH 30.0   MCHC 34.0     CMP:  Recent Labs   Lab 10/29/19  0742   Glucose 86   Calcium 9.3   Albumin 4.0   Total Protein 6.8   Sodium 140   Potassium 4.7   CO2 30   Chloride 104   BUN 14   Alkaline Phosphatase 51   ALT 15   AST 16   Total Bilirubin 0.8     URINALYSIS:       LIPIDS:  Recent Labs   Lab 10/29/19  0742   TSH 2.75   HDL 49 L   Cholesterol 165   Triglycerides 91   LDL Cholesterol 97   HDL/Cholesterol Ratio 3.4   Non HDL Chol. (LDL+VLDL) 116     TSH:  Recent Labs   Lab 10/29/19  0742   TSH 2.75     A1C:        Assessment/Plan     Nell Sanches is a 50 y.o.female with:    1. Essential hypertension  Continue current meds.    2. Mixed hyperlipidemia  Continue current meds.    3. Thyroid nodule  Continue current meds.      Chronic conditions status updated as per HPI.  Other than changes above, cont current medications and maintain follow up with specialists.  Return to clinic in 6 months.    Juanito Cabral MD  [facility]

## 2021-01-01 LAB
25(OH)D3 SERPL-MCNC: 24 NG/ML (ref 30–100)
ALBUMIN SERPL-MCNC: 4.2 G/DL (ref 3.6–5.1)
ALBUMIN/GLOB SERPL: 1.8 (CALC) (ref 1–2.5)
ALP SERPL-CCNC: 53 U/L (ref 37–153)
ALT SERPL-CCNC: 12 U/L (ref 6–29)
AST SERPL-CCNC: 12 U/L (ref 10–35)
BASOPHILS # BLD AUTO: 81 CELLS/UL (ref 0–200)
BASOPHILS NFR BLD AUTO: 1.3 %
BILIRUB SERPL-MCNC: 0.7 MG/DL (ref 0.2–1.2)
BUN SERPL-MCNC: 14 MG/DL (ref 7–25)
BUN/CREAT SERPL: NORMAL (CALC) (ref 6–22)
CALCIUM SERPL-MCNC: 9.1 MG/DL (ref 8.6–10.4)
CHLORIDE SERPL-SCNC: 105 MMOL/L (ref 98–110)
CHOLEST SERPL-MCNC: 184 MG/DL
CHOLEST/HDLC SERPL: 3.6 (CALC)
CO2 SERPL-SCNC: 29 MMOL/L (ref 20–32)
CREAT SERPL-MCNC: 0.77 MG/DL (ref 0.5–1.05)
EOSINOPHIL # BLD AUTO: 167 CELLS/UL (ref 15–500)
EOSINOPHIL NFR BLD AUTO: 2.7 %
ERYTHROCYTE [DISTWIDTH] IN BLOOD BY AUTOMATED COUNT: 12.8 % (ref 11–15)
GFRSERPLBLD MDRD-ARVRAT: 89 ML/MIN/1.73M2
GLOBULIN SER CALC-MCNC: 2.3 G/DL (CALC) (ref 1.9–3.7)
GLUCOSE SERPL-MCNC: 94 MG/DL (ref 65–99)
HBA1C MFR BLD: 5.1 % OF TOTAL HGB
HCT VFR BLD AUTO: 43.9 % (ref 35–45)
HDLC SERPL-MCNC: 51 MG/DL
HGB BLD-MCNC: 14.3 G/DL (ref 11.7–15.5)
LDLC SERPL CALC-MCNC: 118 MG/DL (CALC)
LYMPHOCYTES # BLD AUTO: 1786 CELLS/UL (ref 850–3900)
LYMPHOCYTES NFR BLD AUTO: 28.8 %
MCH RBC QN AUTO: 28.7 PG (ref 27–33)
MCHC RBC AUTO-ENTMCNC: 32.6 G/DL (ref 32–36)
MCV RBC AUTO: 88 FL (ref 80–100)
MONOCYTES # BLD AUTO: 428 CELLS/UL (ref 200–950)
MONOCYTES NFR BLD AUTO: 6.9 %
NEUTROPHILS # BLD AUTO: 3739 CELLS/UL (ref 1500–7800)
NEUTROPHILS NFR BLD AUTO: 60.3 %
NONHDLC SERPL-MCNC: 133 MG/DL (CALC)
PLATELET # BLD AUTO: 340 THOUSAND/UL (ref 140–400)
PMV BLD REES-ECKER: 10.7 FL (ref 7.5–12.5)
POTASSIUM SERPL-SCNC: 4.7 MMOL/L (ref 3.5–5.3)
PROT SERPL-MCNC: 6.5 G/DL (ref 6.1–8.1)
RBC # BLD AUTO: 4.99 MILLION/UL (ref 3.8–5.1)
SODIUM SERPL-SCNC: 138 MMOL/L (ref 135–146)
T4 FREE SERPL-MCNC: 1.1 NG/DL (ref 0.8–1.8)
TRIGL SERPL-MCNC: 63 MG/DL
TSH SERPL-ACNC: 2.66 MIU/L
WBC # BLD AUTO: 6.2 THOUSAND/UL (ref 3.8–10.8)

## 2021-01-13 ENCOUNTER — TELEPHONE (OUTPATIENT)
Dept: FAMILY MEDICINE | Facility: CLINIC | Age: 51
End: 2021-01-13

## 2021-01-21 RX ORDER — LISINOPRIL 20 MG/1
20 TABLET ORAL DAILY
Qty: 90 TABLET | Refills: 3 | Status: SHIPPED | OUTPATIENT
Start: 2021-01-21 | End: 2021-04-29 | Stop reason: SDUPTHER

## 2021-04-30 RX ORDER — LISINOPRIL 20 MG/1
20 TABLET ORAL DAILY
Qty: 90 TABLET | Refills: 3 | Status: SHIPPED | OUTPATIENT
Start: 2021-04-30 | End: 2022-04-27 | Stop reason: SDUPTHER

## 2021-12-10 ENCOUNTER — HOSPITAL ENCOUNTER (OUTPATIENT)
Dept: RADIOLOGY | Facility: OTHER | Age: 51
Discharge: HOME | End: 2021-12-10
Attending: OBSTETRICS & GYNECOLOGY
Payer: COMMERCIAL

## 2021-12-10 DIAGNOSIS — Z12.31 BREAST CANCER SCREENING BY MAMMOGRAM: ICD-10-CM

## 2021-12-10 PROCEDURE — 77067 SCR MAMMO BI INCL CAD: CPT | Mod: 26,,, | Performed by: RADIOLOGY

## 2021-12-10 PROCEDURE — 77067 MAMMO DIGITAL SCREENING BILAT WITH TOMO: ICD-10-PCS | Mod: 26,,, | Performed by: RADIOLOGY

## 2021-12-10 PROCEDURE — 77063 BREAST TOMOSYNTHESIS BI: CPT | Mod: 26,,, | Performed by: RADIOLOGY

## 2021-12-10 PROCEDURE — 77067 SCR MAMMO BI INCL CAD: CPT | Mod: TC

## 2021-12-10 PROCEDURE — 77063 MAMMO DIGITAL SCREENING BILAT WITH TOMO: ICD-10-PCS | Mod: 26,,, | Performed by: RADIOLOGY

## 2022-04-09 NOTE — TELEPHONE ENCOUNTER
Care Due:                  Date            Visit Type   Department     Provider  --------------------------------------------------------------------------------                                NEW PATIENT                              - OPEN       [facility]  Last Visit: 12-      SCHEDULING   CARE           Juanito Camejo  Next Visit: None Scheduled  None         None Found                                                            Last  Test          Frequency    Reason                     Performed    Due Date  --------------------------------------------------------------------------------    Office Visit  12 months..  lisinopriL...............  12- 12-    CMP.........  12 months..  lisinopriL...............  12- 12-    Powered by [employer]. Reference number: [id number].   4/09/2022 3:40:58 PM CDT

## 2022-04-11 NOTE — TELEPHONE ENCOUNTER
Refill Routing Note   Medication(s) are not appropriate for processing by [facility] for the following reason(s):      - Patient has not been seen in over 15 months by PCP  - Required laboratory values are outdated    [facility] action(s):  Defer Medication-related problems identified:   Requires appointment  Requires labs        Medication reconciliation completed: No     Appointments  past 12m or future 3m with PCP    Date Provider   Last Visit   12/14/2020 Juanito Cabral MD   Next Visit   Visit date not found Juanito Cabral MD   ED visits in past 90 days: 0        Note composed:1:56 PM 04/11/2022

## 2022-04-12 RX ORDER — LISINOPRIL 20 MG/1
TABLET ORAL
Qty: 90 TABLET | Refills: 3 | OUTPATIENT
Start: 2022-04-12

## 2022-04-16 NOTE — TELEPHONE ENCOUNTER
Provider Staff:     Action required for this patient.    Please note Refusal of medication.            Requested Prescriptions     Refused Prescriptions Disp Refills    lisinopriL (PRINIVIL,ZESTRIL) 20 MG tablet [Pharmacy Med Name: LISINOPRIL 20MG TABLETS] 90 tablet 3     Sig: TAKE 1 TABLET(20 MG) BY MOUTH EVERY DAY     Refused By: THEE PAIZ     Reason for Refusal: Patient needs an appointment      Thanks!  [facility]   Note composed: 04/16/2022 6:04 PM

## 2022-04-27 RX ORDER — LISINOPRIL 20 MG/1
20 TABLET ORAL DAILY
Qty: 90 TABLET | Refills: 3 | Status: SHIPPED | OUTPATIENT
Start: 2022-04-27 | End: 2022-08-31

## 2022-04-27 NOTE — TELEPHONE ENCOUNTER
No new care gaps identified.  Powered by [employer]. Reference number: [id number].   4/27/2022 10:50:49 AM CDT

## 2022-04-27 NOTE — TELEPHONE ENCOUNTER
----- Message from Franny Velez sent at 4/27/2022 10:28 AM CDT -----  Contact: Pt Mobile  [phone]  Requesting an RX refill or new RX.  Is this a refill or new RX: Refill  RX name and strength lisinopriL (PRINIVIL,ZESTRIL) 20 MG tablet  Is this a 30 day or 90 day RX: 90  Pharmacy name and phone #   [id number] - [address] AT [facility] & [address] [address]  Phone: [phone] Fax: [phone]  The doctors have asked that we provide their patients with the following 2 reminders -- prescription refills can take up to 72 hours, and a friendly reminder that in the future you can use your [facility] account to request refills:

## 2022-05-04 ENCOUNTER — OFFICE VISIT (OUTPATIENT)
Dept: INTERNAL MEDICINE | Facility: CLINIC | Age: 52
End: 2022-05-04
Payer: COMMERCIAL

## 2022-05-04 VITALS
SYSTOLIC BLOOD PRESSURE: 120 MMHG | DIASTOLIC BLOOD PRESSURE: 80 MMHG | HEART RATE: 91 BPM | OXYGEN SATURATION: 98 % | HEIGHT: 72 IN | BODY MASS INDEX: 34.66 KG/M2 | WEIGHT: 255.88 LBS

## 2022-05-04 DIAGNOSIS — I87.2 VENOUS INSUFFICIENCY: ICD-10-CM

## 2022-05-04 DIAGNOSIS — E88.810 METABOLIC SYNDROME: ICD-10-CM

## 2022-05-04 DIAGNOSIS — F41.9 ANXIETY: ICD-10-CM

## 2022-05-04 DIAGNOSIS — I10 ESSENTIAL HYPERTENSION: ICD-10-CM

## 2022-05-04 DIAGNOSIS — E55.9 VITAMIN D DEFICIENCY: ICD-10-CM

## 2022-05-04 DIAGNOSIS — E78.2 MIXED HYPERLIPIDEMIA: ICD-10-CM

## 2022-05-04 DIAGNOSIS — Z00.00 ANNUAL PHYSICAL EXAM: Primary | ICD-10-CM

## 2022-05-04 DIAGNOSIS — E04.1 THYROID NODULE: ICD-10-CM

## 2022-05-04 DIAGNOSIS — E66.01 MORBID OBESITY: ICD-10-CM

## 2022-05-04 DIAGNOSIS — R73.01 IMPAIRED FASTING GLUCOSE: ICD-10-CM

## 2022-05-04 PROCEDURE — 3079F DIAST BP 80-89 MM HG: CPT | Mod: CPTII,S$GLB,, | Performed by: INTERNAL MEDICINE

## 2022-05-04 PROCEDURE — 4010F PR ACE/ARB THEARPY RXD/TAKEN: ICD-10-PCS | Mod: CPTII,S$GLB,, | Performed by: INTERNAL MEDICINE

## 2022-05-04 PROCEDURE — 1159F MED LIST DOCD IN RCRD: CPT | Mod: CPTII,S$GLB,, | Performed by: INTERNAL MEDICINE

## 2022-05-04 PROCEDURE — 4010F ACE/ARB THERAPY RXD/TAKEN: CPT | Mod: CPTII,S$GLB,, | Performed by: INTERNAL MEDICINE

## 2022-05-04 PROCEDURE — 1160F RVW MEDS BY RX/DR IN RCRD: CPT | Mod: CPTII,S$GLB,, | Performed by: INTERNAL MEDICINE

## 2022-05-04 PROCEDURE — 1159F PR MEDICATION LIST DOCUMENTED IN MEDICAL RECORD: ICD-10-PCS | Mod: CPTII,S$GLB,, | Performed by: INTERNAL MEDICINE

## 2022-05-04 PROCEDURE — 3008F BODY MASS INDEX DOCD: CPT | Mod: CPTII,S$GLB,, | Performed by: INTERNAL MEDICINE

## 2022-05-04 PROCEDURE — 99396 PR PREVENTIVE VISIT,EST,40-64: ICD-10-PCS | Mod: S$GLB,,, | Performed by: INTERNAL MEDICINE

## 2022-05-04 PROCEDURE — 3008F PR BODY MASS INDEX (BMI) DOCUMENTED: ICD-10-PCS | Mod: CPTII,S$GLB,, | Performed by: INTERNAL MEDICINE

## 2022-05-04 PROCEDURE — 3074F PR MOST RECENT SYSTOLIC BLOOD PRESSURE < 130 MM HG: ICD-10-PCS | Mod: CPTII,S$GLB,, | Performed by: INTERNAL MEDICINE

## 2022-05-04 PROCEDURE — 99396 PREV VISIT EST AGE 40-64: CPT | Mod: S$GLB,,, | Performed by: INTERNAL MEDICINE

## 2022-05-04 PROCEDURE — 99999 PR PBB SHADOW E&M-EST. PATIENT-LVL III: CPT | Mod: PBBFAC,,, | Performed by: INTERNAL MEDICINE

## 2022-05-04 PROCEDURE — 3074F SYST BP LT 130 MM HG: CPT | Mod: CPTII,S$GLB,, | Performed by: INTERNAL MEDICINE

## 2022-05-04 PROCEDURE — 99999 PR PBB SHADOW E&M-EST. PATIENT-LVL III: ICD-10-PCS | Mod: PBBFAC,,, | Performed by: INTERNAL MEDICINE

## 2022-05-04 PROCEDURE — 3079F PR MOST RECENT DIASTOLIC BLOOD PRESSURE 80-89 MM HG: ICD-10-PCS | Mod: CPTII,S$GLB,, | Performed by: INTERNAL MEDICINE

## 2022-05-04 PROCEDURE — 1160F PR REVIEW ALL MEDS BY PRESCRIBER/CLIN PHARMACIST DOCUMENTED: ICD-10-PCS | Mod: CPTII,S$GLB,, | Performed by: INTERNAL MEDICINE

## 2022-05-04 RX ORDER — TRIAMTERENE/HYDROCHLOROTHIAZID 37.5-25 MG
1 TABLET ORAL DAILY
Qty: 30 TABLET | Refills: 11 | Status: SHIPPED | OUTPATIENT
Start: 2022-05-04 | End: 2023-06-02

## 2022-05-04 RX ORDER — SEMAGLUTIDE 1.34 MG/ML
INJECTION, SOLUTION SUBCUTANEOUS
Qty: 2 PEN | Refills: 3 | Status: SHIPPED | OUTPATIENT
Start: 2022-05-04 | End: 2022-07-13

## 2022-05-04 NOTE — PROGRESS NOTES
[facility] Note    Chief Complaint      Chief Complaint   Patient presents with    Annual Exam       History of Present Illness      Nell Sanches is a 51 y.o. female with chronic conditions of HTN, HLD, anxiety, thyroid nodule, vit d deficiency who presents today for: annual preventative visit.  Complains of fluid retention.  Complains of difficulty losing weight.  HTN: BP at goal on lisinopril.  Anxiety: Controlled without medications.  Stretching and exercising.   Thyroid nodule: Due for repeat ultrasound.    Diet: Prepares own food mostly.  Limiting fatty foods and carbs  Exercise: limited by ankle and hip pains  Denies drinking and driving, drinking more than 4 drinks on occasion, drug use.     Flu shot 2020.  TdAP 2019.  COVID vaccine UTD.    Mammogram UTD.  DEXA UTD 2019.  Dr. Gustafson.  PAP smear UTD.   Cscope 2019 Dr. Salgado, polyp, 5 yr interval.    Past Medical History:  Past Medical History:   Diagnosis Date    Abnormal weight gain 10/2/2012    Other and unspecified hyperlipidemia 10/2/2012    Unspecified vitamin D deficiency 10/2/2012       Past Surgical History:   has a past surgical history that includes Breast biopsy (Right, 2012).    Family History:  family history includes Colon cancer (age of onset: 70) in her maternal grandmother.     Social History:  Social History     Tobacco Use    Smoking status: Never Smoker    Smokeless tobacco: Never Used       I personally reviewed all past medical, surgical, social and family history.    ROS     Medications:  Outpatient Encounter Medications as of 5/4/2022   Medication Sig Dispense Refill    b complex vitamins capsule Take 1 capsule by mouth once daily.      Ca/D3/mag ox/zinc//nancy/bor (CALCIUM 600-D3 PLUS ORAL) Take by mouth.      coenzyme Q10 200 mg capsule Take 200 mg by mouth once daily.      docosahexanoic acid/epa (FISH OIL ORAL) Take by mouth.      lisinopriL (PRINIVIL,ZESTRIL) 20 MG tablet Take 1 tablet  (20 mg total) by mouth once daily. 90 tablet 3    meloxicam (MOBIC) 15 MG tablet Take 15 mg by mouth once daily.      semaglutide (OZEMPIC) 0.25 mg or 0.5 mg(2 mg/1.5 mL) pen injector Inject 0.25 mg subcutaneously weekly for 4 weeks, then increase to 0.5 mg weekly 2 pen 3    triamterene-hydrochlorothiazide 37.5-25 mg (MAXZIDE-25) 37.5-25 mg per tablet Take 1 tablet by mouth once daily. 30 tablet 11    vit A/vit C/vit E/zinc/copper (ICAPS AREDS ORAL) Take by mouth.       No facility-administered encounter medications on file as of 5/4/2022.       Allergies:  Review of patient's allergies indicates:   Allergen Reactions    Sulfa (sulfonamide antibiotics) Hives       Health Maintenance:  Immunization History   Administered Date(s) Administered    COVID-19, MRNA, LN-S, PF (MODERNA FULL 0.5 ML DOSE) 01/22/2021, 02/20/2021, 12/17/2021    Influenza 10/04/2012    Influenza - Quadrivalent - MDCK - PF 10/10/2018    Influenza - Quadrivalent - PF *Preferred* (6 months and older) 12/06/2017, 10/04/2019, 10/08/2020    Influenza - Trivalent - PF (ADULT) 10/04/2012    Influenza Split 12/06/2017    Tdap 10/04/2019      Health Maintenance   Topic Date Due    Hepatitis C Screening  Never done    Mammogram  12/10/2022    Lipid Panel  12/29/2022    TETANUS VACCINE  10/04/2029        Physical Exam      Vital Signs  Pulse: 91  SpO2: 98 %  BP: 120/80  BP Location: Left arm  Patient Position: Sitting  Pain Score: 0-No pain  Height and Weight  Height: 6' (182.9 cm)  Weight: 116 kg (255 lb 13.5 oz)  BSA (Calculated - sq m): 2.43 sq meters  BMI (Calculated): 34.7  Weight in (lb) to have BMI = 25: 183.9]    Physical Exam     Laboratory:  CBC:  Recent Labs   Lab 10/29/19  0742 12/31/20  0712   WBC 6.3 6.2   RBC 4.66 4.99   Hemoglobin 14.0 14.3   Hematocrit 41.2 43.9   Platelets 304 340   MCV 88.4 88.0   MCH 30.0 28.7   MCHC 34.0 32.6     CMP:  Recent Labs   Lab 10/29/19  0742 12/31/20  0712   Glucose 86 94   Calcium 9.3 9.1    Albumin 4.0 4.2   Total Protein 6.8 6.5   Sodium 140 138   Potassium 4.7 4.7   CO2 30 29   Chloride 104 105   BUN 14 14   Alkaline Phosphatase 51  --    ALT 15 12   AST 16 12   Total Bilirubin 0.8 0.7     URINALYSIS:       LIPIDS:  Recent Labs   Lab 10/29/19  0742 12/31/20  0712   TSH 2.75 2.66   HDL 49 L 51   Cholesterol 165 184   Triglycerides 91 63   LDL Cholesterol 97 118 H   HDL/Cholesterol Ratio 3.4 3.6   Non HDL Chol. (LDL+VLDL) 116 133 H     TSH:  Recent Labs   Lab 10/29/19  0742 12/31/20  0712   TSH 2.75 2.66     A1C:  Recent Labs   Lab 12/31/20 0712   Hemoglobin A1C 5.1       Assessment/Plan     Nell Sanches is a 51 y.o.female with:    1. Annual physical exam  - CBC Auto Differential; Future  - Comprehensive Metabolic Panel; Future  - Lipid Panel; Future  - TSH; Future  - T4, Free; Future  - CBC Auto Differential  - Comprehensive Metabolic Panel  - Lipid Panel  - TSH  - T4, Free  - Hemoglobin A1C; Future  - Hemoglobin A1C  Discussed diet and exercise, vaccines and cancer screening, risk factors.  Screening labs ordered.     2. Essential hypertension  Continue current meds.    3. Mixed hyperlipidemia  Continue current meds.    4. Anxiety  Continue current meds.    5. Thyroid nodule  - US Soft Tissue Head Neck Thyroid; Future  - US Soft Tissue Head Neck Thyroid  CHeck thyroid ultrasound  6. Vitamin D deficiency    7. Impaired fasting glucose  - Hemoglobin A1C; Future  - Hemoglobin A1C  - semaglutide (OZEMPIC) 0.25 mg or 0.5 mg(2 mg/1.5 mL) pen injector; Inject 0.25 mg subcutaneously weekly for 4 weeks, then increase to 0.5 mg weekly  Dispense: 2 pen; Refill: 3  8. Morbid obesity  - semaglutide (OZEMPIC) 0.25 mg or 0.5 mg(2 mg/1.5 mL) pen injector; Inject 0.25 mg subcutaneously weekly for 4 weeks, then increase to 0.5 mg weekly  Dispense: 2 pen; Refill: 3  9. Metabolic syndrome  - semaglutide (OZEMPIC) 0.25 mg or 0.5 mg(2 mg/1.5 mL) pen injector; Inject 0.25 mg subcutaneously weekly for 4  weeks, then increase to 0.5 mg weekly  Dispense: 2 pen; Refill: 3  Check labs.  Start ozempic if covered.  10. Venous insufficiency  - triamterene-hydrochlorothiazide 37.5-25 mg (MAXZIDE-25) 37.5-25 mg per tablet; Take 1 tablet by mouth once daily.  Dispense: 30 tablet; Refill: 11       Chronic conditions status updated as per HPI.  Other than changes above, cont current medications and maintain follow up with specialists.  Follow up in about 1 year (around 5/4/2023).    No future appointments.    Juanito Cabral MD  [facility]

## 2022-05-13 LAB
ALBUMIN: 5 G/DL (ref 3.5–5)
ALP SERPL-CCNC: 66 U/L (ref 38–126)
ALT SERPL W P-5'-P-CCNC: 15 U/L (ref 7–56)
ANION GAP SERPL CALC-SCNC: 17.2 MMOL/L (ref 9–18)
AST SERPL-CCNC: 14 U/L (ref 7–40)
BASOPHILS ABSOLUTE COUNT: 0.1 THOUSAND/UL (ref 0–0.2)
BASOPHILS NFR BLD: 1.3 % (ref 0–2)
BILIRUB SERPL-MCNC: 0.7 MG/DL (ref 0–1.2)
BUN BLD-MCNC: 15 MG/DL (ref 7–21)
BUN/CREAT SERPL: 20 (ref 6–22)
CALC OSMOLALITY: 277 MOSM/KG (ref 275–295)
CALCIUM SERPL-MCNC: 9.3 MG/DL (ref 8.5–10.4)
CHLORIDE SERPL-SCNC: 102 MMOL/L (ref 98–107)
CHOL/HDLC RATIO: 3.29
CHOLEST SERPL-MSCNC: 184 MG/DL (ref 100–200)
CO2 SERPL-SCNC: 23 MMOL/L (ref 21–31)
CREAT SERPL-MCNC: 0.74 MG/DL (ref 0.5–1)
EGFR: 108 ML/MIN
EOSINOPHIL NFR BLD: 2.3 % (ref 0–4)
EOSINOPHILS ABSOLUTE COUNT: 0.2 THOUSAND/UL (ref 0–0.7)
ERYTHROCYTE [DISTWIDTH] IN BLOOD BY AUTOMATED COUNT: 13 % (ref 12–15.3)
FREE T4: 1.26 NG/DL (ref 0.9–1.7)
GFR: 93 ML/MIN
GLUCOSE SERPL-MCNC: 102 MG/DL (ref 70–100)
HBA1C MFR BLD: 5 % (ref 4.5–5.7)
HCT VFR BLD AUTO: 46.3 % (ref 37–47)
HDLC SERPL-MCNC: 56 MG/DL (ref 40–75)
HGB BLD-MCNC: 15.5 GM/DL (ref 12–16)
LDLC SERPL CALC-MCNC: 114 MG/DL (ref 0–125)
LYMPHOCYTES %: 29.3 % (ref 15–45)
LYMPHOCYTES ABSOLUTE COUNT: 2 THOUSAND/UL (ref 1–4.2)
MCH RBC QN AUTO: 29.4 PG (ref 27–33)
MCHC RBC AUTO-ENTMCNC: 33.6 G/DL (ref 32–36)
MCV RBC AUTO: 87.3 FL (ref 81–99)
MONOCYTES %: 8.6 % (ref 3–13)
MONOCYTES ABSOLUTE COUNT: 0.6 THOUSAND/UL (ref 0.1–0.8)
NEUTROPHILS ABSOLUTE COUNT: 4 THOUSAND/UL (ref 2.1–7.6)
NEUTROPHILS RELATIVE PERCENT: 58.5 % (ref 32–80)
PLATELET # BLD AUTO: 347 THOUSAND/UL (ref 150–350)
PMV BLD AUTO: 9.1 FL (ref 7–10.2)
POTASSIUM SERPL-SCNC: 4.2 MMOL/L (ref 3.5–5)
RBC # BLD AUTO: 5.3 MILLION/UL (ref 4.2–5.4)
SODIUM BLD-SCNC: 138 MMOL/L (ref 135–145)
TOTAL PROTEIN: 7.5 G/DL (ref 6.3–8.2)
TRIGL SERPL-MCNC: 88 MG/DL (ref 30–150)
TSH SERPL DL<=0.005 MIU/L-ACNC: 2.59 UIU/ML (ref 0.35–4)
WBC # BLD AUTO: 6.9 THOUSAND/UL (ref 4.5–11)

## 2022-05-30 ENCOUNTER — TELEPHONE (OUTPATIENT)
Dept: INTERNAL MEDICINE | Facility: CLINIC | Age: 52
End: 2022-05-30
Payer: COMMERCIAL

## 2022-05-30 NOTE — TELEPHONE ENCOUNTER
Thyroid nodule on right lobe of thyroid unchanged from previous.  No further workup needed at this time.  No other concerning findings.

## 2022-06-10 ENCOUNTER — OFFICE VISIT (OUTPATIENT)
Dept: INTERNAL MEDICINE | Facility: CLINIC | Age: 52
End: 2022-06-10
Payer: COMMERCIAL

## 2022-06-10 VITALS
HEART RATE: 90 BPM | SYSTOLIC BLOOD PRESSURE: 112 MMHG | TEMPERATURE: 98 F | DIASTOLIC BLOOD PRESSURE: 80 MMHG | HEIGHT: 72 IN | BODY MASS INDEX: 33.86 KG/M2 | WEIGHT: 250 LBS | OXYGEN SATURATION: 96 %

## 2022-06-10 DIAGNOSIS — E66.01 MORBID OBESITY: Primary | ICD-10-CM

## 2022-06-10 DIAGNOSIS — I10 ESSENTIAL HYPERTENSION: ICD-10-CM

## 2022-06-10 PROCEDURE — 3074F PR MOST RECENT SYSTOLIC BLOOD PRESSURE < 130 MM HG: ICD-10-PCS | Mod: CPTII,S$GLB,, | Performed by: INTERNAL MEDICINE

## 2022-06-10 PROCEDURE — 3074F SYST BP LT 130 MM HG: CPT | Mod: CPTII,S$GLB,, | Performed by: INTERNAL MEDICINE

## 2022-06-10 PROCEDURE — 3044F PR MOST RECENT HEMOGLOBIN A1C LEVEL <7.0%: ICD-10-PCS | Mod: CPTII,S$GLB,, | Performed by: INTERNAL MEDICINE

## 2022-06-10 PROCEDURE — 4010F PR ACE/ARB THEARPY RXD/TAKEN: ICD-10-PCS | Mod: CPTII,S$GLB,, | Performed by: INTERNAL MEDICINE

## 2022-06-10 PROCEDURE — 3008F PR BODY MASS INDEX (BMI) DOCUMENTED: ICD-10-PCS | Mod: CPTII,S$GLB,, | Performed by: INTERNAL MEDICINE

## 2022-06-10 PROCEDURE — 1159F PR MEDICATION LIST DOCUMENTED IN MEDICAL RECORD: ICD-10-PCS | Mod: CPTII,S$GLB,, | Performed by: INTERNAL MEDICINE

## 2022-06-10 PROCEDURE — 3044F HG A1C LEVEL LT 7.0%: CPT | Mod: CPTII,S$GLB,, | Performed by: INTERNAL MEDICINE

## 2022-06-10 PROCEDURE — 99999 PR PBB SHADOW E&M-EST. PATIENT-LVL III: ICD-10-PCS | Mod: PBBFAC,,, | Performed by: INTERNAL MEDICINE

## 2022-06-10 PROCEDURE — 3079F DIAST BP 80-89 MM HG: CPT | Mod: CPTII,S$GLB,, | Performed by: INTERNAL MEDICINE

## 2022-06-10 PROCEDURE — 99213 OFFICE O/P EST LOW 20 MIN: CPT | Mod: S$GLB,,, | Performed by: INTERNAL MEDICINE

## 2022-06-10 PROCEDURE — 3008F BODY MASS INDEX DOCD: CPT | Mod: CPTII,S$GLB,, | Performed by: INTERNAL MEDICINE

## 2022-06-10 PROCEDURE — 99999 PR PBB SHADOW E&M-EST. PATIENT-LVL III: CPT | Mod: PBBFAC,,, | Performed by: INTERNAL MEDICINE

## 2022-06-10 PROCEDURE — 99213 PR OFFICE/OUTPT VISIT, EST, LEVL III, 20-29 MIN: ICD-10-PCS | Mod: S$GLB,,, | Performed by: INTERNAL MEDICINE

## 2022-06-10 PROCEDURE — 1159F MED LIST DOCD IN RCRD: CPT | Mod: CPTII,S$GLB,, | Performed by: INTERNAL MEDICINE

## 2022-06-10 PROCEDURE — 4010F ACE/ARB THERAPY RXD/TAKEN: CPT | Mod: CPTII,S$GLB,, | Performed by: INTERNAL MEDICINE

## 2022-06-10 PROCEDURE — 3079F PR MOST RECENT DIASTOLIC BLOOD PRESSURE 80-89 MM HG: ICD-10-PCS | Mod: CPTII,S$GLB,, | Performed by: INTERNAL MEDICINE

## 2022-06-10 NOTE — PROGRESS NOTES
[facility] Note    Chief Complaint      Chief Complaint   Patient presents with    Follow-up       History of Present Illness      Nell Sanches is a 51 y.o. female with chronic conditions of HTN, HLD, anxiety, thyroid nodule, vit d deficiency who presents today for: follow up weight gain and fluid retention.  Has had significant improvement in fluid retention since starting Maxzide.  BP lowers after taking but not having hypotensive symptoms.  Has taken 0.25 mg ozempic weekly for 4 weeks, started 0.5 mg x 1 dose.  Had lightheadedness/weakness/empty stomach sensation intermittently for 2 days after 0.5 mg dose.  Has lost 5 lbs in a month.      Past Medical History:  Past Medical History:   Diagnosis Date    Abnormal weight gain 10/2/2012    Other and unspecified hyperlipidemia 10/2/2012    Unspecified vitamin D deficiency 10/2/2012       Past Surgical History:   has a past surgical history that includes Breast biopsy (Right, 2012).    Family History:  family history includes Colon cancer (age of onset: 70) in her maternal grandmother.     Social History:  Social History     Tobacco Use    Smoking status: Never Smoker    Smokeless tobacco: Never Used       I personally reviewed all past medical, surgical, social and family history.    Review of Systems   Constitutional: Negative for chills, fever and malaise/fatigue.   Respiratory: Negative for shortness of breath.    Cardiovascular: Negative for chest pain.   Gastrointestinal: Negative for constipation, diarrhea, nausea and vomiting.   Skin: Negative for rash.   Neurological: Negative for weakness.   All other systems reviewed and are negative.       Medications:  Outpatient Encounter Medications as of 6/10/2022   Medication Sig Dispense Refill    b complex vitamins capsule Take 1 capsule by mouth once daily.      Ca/D3/mag ox/zinc//nancy/bor (CALCIUM 600-D3 PLUS ORAL) Take by mouth.      coenzyme Q10 200 mg capsule Take 200  mg by mouth once daily.      docosahexanoic acid/epa (FISH OIL ORAL) Take by mouth.      lisinopriL (PRINIVIL,ZESTRIL) 20 MG tablet Take 1 tablet (20 mg total) by mouth once daily. 90 tablet 3    meloxicam (MOBIC) 15 MG tablet Take 15 mg by mouth once daily.      semaglutide (OZEMPIC) 0.25 mg or 0.5 mg(2 mg/1.5 mL) pen injector Inject 0.25 mg subcutaneously weekly for 4 weeks, then increase to 0.5 mg weekly 2 pen 3    triamterene-hydrochlorothiazide 37.5-25 mg (MAXZIDE-25) 37.5-25 mg per tablet Take 1 tablet by mouth once daily. 30 tablet 11    vit A/vit C/vit E/zinc/copper (ICAPS AREDS ORAL) Take by mouth.       No facility-administered encounter medications on file as of 6/10/2022.       Allergies:  Review of patient's allergies indicates:   Allergen Reactions    Sulfa (sulfonamide antibiotics) Hives       Health Maintenance:  Immunization History   Administered Date(s) Administered    COVID-19, MRNA, LN-S, PF (MODERNA FULL 0.5 ML DOSE) 01/22/2021, 02/20/2021, 12/17/2021    Influenza 10/04/2012    Influenza - Quadrivalent - MDCK - PF 10/10/2018    Influenza - Quadrivalent - PF *Preferred* (6 months and older) 12/06/2017, 10/04/2019, 10/08/2020    Influenza - Trivalent - PF (ADULT) 10/04/2012    Influenza Split 12/06/2017    Tdap 10/04/2019      Health Maintenance   Topic Date Due    Hepatitis C Screening  Never done    Mammogram  12/10/2022    Lipid Panel  05/13/2023    TETANUS VACCINE  10/04/2029        Physical Exam      Vital Signs  Temp: 98.3 °F (36.8 °C)  Temp src: Oral  Pulse: 90  SpO2: 96 %  BP: 112/80  BP Location: Left arm  Patient Position: Sitting  Pain Score: 0-No pain  Height and Weight  Height: 6' (182.9 cm)  Weight: 113.4 kg (250 lb)  BSA (Calculated - sq m): 2.4 sq meters  BMI (Calculated): 33.9  Weight in (lb) to have BMI = 25: 183.9]    Physical Exam  Vitals reviewed.   Constitutional:       Appearance: She is well-developed.   HENT:      Head: Normocephalic and atraumatic.       Right Ear: External ear normal.      Left Ear: External ear normal.   Cardiovascular:      Rate and Rhythm: Normal rate and regular rhythm.      Heart sounds: Normal heart sounds. No murmur heard.  Pulmonary:      Effort: Pulmonary effort is normal.      Breath sounds: Normal breath sounds. No wheezing or rales.   Abdominal:      General: Bowel sounds are normal. There is no distension.      Palpations: Abdomen is soft.      Tenderness: There is no abdominal tenderness.          Laboratory:  CBC:  Recent Labs   Lab 10/29/19  0742 12/31/20  0712 05/13/22  0731   WBC 6.3 6.2 6.9   RBC 4.66 4.99 5.30   Hemoglobin 14.0 14.3 15.5   Hematocrit 41.2 43.9 46.3   Platelets 304 340 347   MCV 88.4 88.0 87.3   MCH 30.0 28.7 29.4   MCHC 34.0 32.6 33.6     CMP:  Recent Labs   Lab 10/29/19  0742 12/31/20 0712 05/13/22  0731   Glucose 86 94 102 H   Calcium 9.3 9.1 9.3   Albumin 4.0 4.2  --    ALBUMIN  --   --  5.0   Total Protein 6.8 6.5 7.5   Sodium 140 138 138   Potassium 4.7 4.7 4.2   CO2 30 29 23   Chloride 104 105 102   BUN 14 14 15.0   Alkaline Phosphatase 51  --  66   ALT 15 12 15   AST 16 12 14   Total Bilirubin 0.8 0.7 0.7     URINALYSIS:       LIPIDS:  Recent Labs   Lab 10/29/19  0742 12/31/20 0712 05/13/22  0731   TSH 2.75 2.66 2.59   HDL 49 L 51 56   Cholesterol 165 184 184   Triglycerides 91 63 88   LDL Calculated  --   --  114   LDL Cholesterol 97 118 H  --    HDL/Cholesterol Ratio 3.4 3.6  --    Non HDL Chol. (LDL+VLDL) 116 133 H  --      TSH:  Recent Labs   Lab 10/29/19  0742 12/31/20 0712 05/13/22  0731   TSH 2.75 2.66 2.59     A1C:  Recent Labs   Lab 12/31/20 0712 05/13/22  0733   Hemoglobin A1C 5.1 5.0       Assessment/Plan     Nell Sanches is a 51 y.o.female with:    1. Morbid obesity  Continue ozempic 0.5 mg weekly for another 3 weeks.  Has lost 5 lbs already.  Send message prior to next refill to determine whether to increase dose.    2. Essential hypertension  Discussed stopping lisinopril  and taking maxzide if pt starts using diuretic more regularly. For now, BP at goal and will continue medications.    Chronic conditions status updated as per HPI.  Other than changes above, cont current medications and maintain follow up with specialists.  No follow-ups on file.    No future appointments.    Juanito Cabral MD  [facility]

## 2022-07-13 ENCOUNTER — OFFICE VISIT (OUTPATIENT)
Dept: INTERNAL MEDICINE | Facility: CLINIC | Age: 52
End: 2022-07-13
Payer: COMMERCIAL

## 2022-07-13 VITALS
HEIGHT: 72 IN | OXYGEN SATURATION: 97 % | SYSTOLIC BLOOD PRESSURE: 112 MMHG | BODY MASS INDEX: 33.89 KG/M2 | TEMPERATURE: 99 F | HEART RATE: 85 BPM | WEIGHT: 250.25 LBS | DIASTOLIC BLOOD PRESSURE: 80 MMHG

## 2022-07-13 DIAGNOSIS — E66.01 MORBID OBESITY: Primary | ICD-10-CM

## 2022-07-13 PROCEDURE — 3074F SYST BP LT 130 MM HG: CPT | Mod: CPTII,S$GLB,, | Performed by: INTERNAL MEDICINE

## 2022-07-13 PROCEDURE — 1159F MED LIST DOCD IN RCRD: CPT | Mod: CPTII,S$GLB,, | Performed by: INTERNAL MEDICINE

## 2022-07-13 PROCEDURE — 99999 PR PBB SHADOW E&M-EST. PATIENT-LVL III: ICD-10-PCS | Mod: PBBFAC,,, | Performed by: INTERNAL MEDICINE

## 2022-07-13 PROCEDURE — 99213 OFFICE O/P EST LOW 20 MIN: CPT | Mod: S$GLB,,, | Performed by: INTERNAL MEDICINE

## 2022-07-13 PROCEDURE — 3074F PR MOST RECENT SYSTOLIC BLOOD PRESSURE < 130 MM HG: ICD-10-PCS | Mod: CPTII,S$GLB,, | Performed by: INTERNAL MEDICINE

## 2022-07-13 PROCEDURE — 99213 PR OFFICE/OUTPT VISIT, EST, LEVL III, 20-29 MIN: ICD-10-PCS | Mod: S$GLB,,, | Performed by: INTERNAL MEDICINE

## 2022-07-13 PROCEDURE — 3008F BODY MASS INDEX DOCD: CPT | Mod: CPTII,S$GLB,, | Performed by: INTERNAL MEDICINE

## 2022-07-13 PROCEDURE — 3079F PR MOST RECENT DIASTOLIC BLOOD PRESSURE 80-89 MM HG: ICD-10-PCS | Mod: CPTII,S$GLB,, | Performed by: INTERNAL MEDICINE

## 2022-07-13 PROCEDURE — 4010F ACE/ARB THERAPY RXD/TAKEN: CPT | Mod: CPTII,S$GLB,, | Performed by: INTERNAL MEDICINE

## 2022-07-13 PROCEDURE — 1159F PR MEDICATION LIST DOCUMENTED IN MEDICAL RECORD: ICD-10-PCS | Mod: CPTII,S$GLB,, | Performed by: INTERNAL MEDICINE

## 2022-07-13 PROCEDURE — 3008F PR BODY MASS INDEX (BMI) DOCUMENTED: ICD-10-PCS | Mod: CPTII,S$GLB,, | Performed by: INTERNAL MEDICINE

## 2022-07-13 PROCEDURE — 3079F DIAST BP 80-89 MM HG: CPT | Mod: CPTII,S$GLB,, | Performed by: INTERNAL MEDICINE

## 2022-07-13 PROCEDURE — 3044F HG A1C LEVEL LT 7.0%: CPT | Mod: CPTII,S$GLB,, | Performed by: INTERNAL MEDICINE

## 2022-07-13 PROCEDURE — 4010F PR ACE/ARB THEARPY RXD/TAKEN: ICD-10-PCS | Mod: CPTII,S$GLB,, | Performed by: INTERNAL MEDICINE

## 2022-07-13 PROCEDURE — 99999 PR PBB SHADOW E&M-EST. PATIENT-LVL III: CPT | Mod: PBBFAC,,, | Performed by: INTERNAL MEDICINE

## 2022-07-13 PROCEDURE — 3044F PR MOST RECENT HEMOGLOBIN A1C LEVEL <7.0%: ICD-10-PCS | Mod: CPTII,S$GLB,, | Performed by: INTERNAL MEDICINE

## 2022-07-13 RX ORDER — SEMAGLUTIDE 1.34 MG/ML
1 INJECTION, SOLUTION SUBCUTANEOUS
Qty: 1 PEN | Refills: 3 | Status: SHIPPED | OUTPATIENT
Start: 2022-07-13 | End: 2022-07-13 | Stop reason: SDUPTHER

## 2022-07-13 RX ORDER — SEMAGLUTIDE 1.34 MG/ML
1 INJECTION, SOLUTION SUBCUTANEOUS
Qty: 1 PEN | Refills: 3 | Status: SHIPPED | OUTPATIENT
Start: 2022-07-13 | End: 2022-11-01

## 2022-07-13 NOTE — PROGRESS NOTES
[facility] Note    Chief Complaint      Chief Complaint   Patient presents with    Follow-up     Weight loss medication        History of Present Illness      Nell Sanches is a 51 y.o. female with chronic conditions of HTN, HLD, anxiety, thyroid nodule, vit d deficiency who presents today for: follow up weight loss medication.  Down 5 lbs since starting but unchanged from last month.      Past Medical History:  Past Medical History:   Diagnosis Date    Abnormal weight gain 10/2/2012    Other and unspecified hyperlipidemia 10/2/2012    Unspecified vitamin D deficiency 10/2/2012       Past Surgical History:   has a past surgical history that includes Breast biopsy (Right, 2012).    Family History:  family history includes Colon cancer (age of onset: 70) in her maternal grandmother.     Social History:  Social History     Tobacco Use    Smoking status: Never Smoker    Smokeless tobacco: Never Used       I personally reviewed all past medical, surgical, social and family history.    Review of Systems   Constitutional: Negative for chills, fever and malaise/fatigue.   Respiratory: Negative for shortness of breath.    Cardiovascular: Negative for chest pain.   Gastrointestinal: Negative for constipation, diarrhea, nausea and vomiting.   Skin: Negative for rash.   Neurological: Negative for weakness.   All other systems reviewed and are negative.       Medications:  Outpatient Encounter Medications as of 7/13/2022   Medication Sig Dispense Refill    b complex vitamins capsule Take 1 capsule by mouth once daily.      Ca/D3/mag ox/zinc//nancy/bor (CALCIUM 600-D3 PLUS ORAL) Take by mouth.      coenzyme Q10 200 mg capsule Take 200 mg by mouth once daily.      docosahexanoic acid/epa (FISH OIL ORAL) Take by mouth.      lisinopriL (PRINIVIL,ZESTRIL) 20 MG tablet Take 1 tablet (20 mg total) by mouth once daily. 90 tablet 3    meloxicam (MOBIC) 15 MG tablet Take 15 mg by mouth once  daily.      semaglutide (OZEMPIC) 1 mg/dose (4 mg/3 mL) Inject 1 mg into the skin every 7 days. 1 pen 3    triamterene-hydrochlorothiazide 37.5-25 mg (MAXZIDE-25) 37.5-25 mg per tablet Take 1 tablet by mouth once daily. 30 tablet 11    vit A/vit C/vit E/zinc/copper (ICAPS AREDS ORAL) Take by mouth.      [DISCONTINUED] semaglutide (OZEMPIC) 0.25 mg or 0.5 mg(2 mg/1.5 mL) pen injector Inject 0.25 mg subcutaneously weekly for 4 weeks, then increase to 0.5 mg weekly 2 pen 3     No facility-administered encounter medications on file as of 7/13/2022.       Allergies:  Review of patient's allergies indicates:   Allergen Reactions    Sulfa (sulfonamide antibiotics) Hives       Health Maintenance:  Immunization History   Administered Date(s) Administered    COVID-19, MRNA, LN-S, PF (MODERNA FULL 0.5 ML DOSE) 01/22/2021, 02/20/2021, 12/17/2021    Influenza 10/04/2012    Influenza - Quadrivalent - MDCK - PF 10/10/2018    Influenza - Quadrivalent - PF *Preferred* (6 months and older) 12/06/2017, 10/04/2019, 10/08/2020    Influenza - Trivalent - PF (ADULT) 10/04/2012    Influenza Split 12/06/2017    Tdap 10/04/2019      Health Maintenance   Topic Date Due    Hepatitis C Screening  Never done    Mammogram  12/10/2022    Lipid Panel  05/13/2023    TETANUS VACCINE  10/04/2029        Physical Exam      Vital Signs  Temp: 99.1 °F (37.3 °C)  Temp src: Oral  Pulse: 85  SpO2: 97 %  BP: 112/80  BP Location: Left arm  Patient Position: Sitting  Pain Score: 0-No pain  Height and Weight  Height: 6' (182.9 cm)  Weight: 113.5 kg (250 lb 3.6 oz)  BSA (Calculated - sq m): 2.4 sq meters  BMI (Calculated): 33.9  Weight in (lb) to have BMI = 25: 183.9]    Physical Exam  Vitals reviewed.   Constitutional:       Appearance: She is well-developed.   HENT:      Head: Normocephalic and atraumatic.      Right Ear: External ear normal.      Left Ear: External ear normal.   Cardiovascular:      Rate and Rhythm: Normal rate and regular  rhythm.      Heart sounds: Normal heart sounds. No murmur heard.  Pulmonary:      Effort: Pulmonary effort is normal.      Breath sounds: Normal breath sounds. No wheezing or rales.   Abdominal:      General: Bowel sounds are normal. There is no distension.      Palpations: Abdomen is soft.      Tenderness: There is no abdominal tenderness.          Laboratory:  CBC:  Recent Labs   Lab 10/29/19  0742 12/31/20  0712 05/13/22  0731   WBC 6.3 6.2 6.9   RBC 4.66 4.99 5.30   Hemoglobin 14.0 14.3 15.5   Hematocrit 41.2 43.9 46.3   Platelets 304 340 347   MCV 88.4 88.0 87.3   MCH 30.0 28.7 29.4   MCHC 34.0 32.6 33.6     CMP:  Recent Labs   Lab 10/29/19  0742 12/31/20 0712 05/13/22  0731   Glucose 86 94 102 H   Calcium 9.3 9.1 9.3   Albumin 4.0 4.2  --    ALBUMIN  --   --  5.0   Total Protein 6.8 6.5 7.5   Sodium 140 138 138   Potassium 4.7 4.7 4.2   CO2 30 29 23   Chloride 104 105 102   BUN 14 14 15.0   Alkaline Phosphatase 51  --  66   ALT 15 12 15   AST 16 12 14   Total Bilirubin 0.8 0.7 0.7     URINALYSIS:       LIPIDS:  Recent Labs   Lab 10/29/19  0742 12/31/20 0712 05/13/22  0731   TSH 2.75 2.66 2.59   HDL 49 L 51 56   Cholesterol 165 184 184   Triglycerides 91 63 88   LDL Calculated  --   --  114   LDL Cholesterol 97 118 H  --    HDL/Cholesterol Ratio 3.4 3.6  --    Non HDL Chol. (LDL+VLDL) 116 133 H  --      TSH:  Recent Labs   Lab 10/29/19  0742 12/31/20 0712 05/13/22  0731   TSH 2.75 2.66 2.59     A1C:  Recent Labs   Lab 12/31/20 0712 05/13/22  0733   Hemoglobin A1C 5.1 5.0       Assessment/Plan     Nell Sanches is a 51 y.o.female with:    1. Morbid obesity  - semaglutide (OZEMPIC) 1 mg/dose (4 mg/3 mL); Inject 1 mg into the skin every 7 days.  Dispense: 1 pen; Refill: 3  Increase to 1 mg weekly    Chronic conditions status updated as per HPI.  Other than changes above, cont current medications and maintain follow up with specialists.  No follow-ups on file.    No future appointments.    Juanito HIGGINBOTHAM  MD Misha  [facility]

## 2022-08-29 ENCOUNTER — TELEPHONE (OUTPATIENT)
Dept: OBSTETRICS AND GYNECOLOGY | Facility: CLINIC | Age: 52
End: 2022-08-29
Payer: COMMERCIAL

## 2022-08-29 NOTE — TELEPHONE ENCOUNTER
----- Message from Vic Munguia sent at 8/29/2022  3:22 PM CDT -----  Regarding: NP Appt Request  Name of Who is Calling: MONA PITTMAN [[id number]]           What is the request in detail: Patient is requesting a call back to schedule a NP appointment for a second opinion regarding fibroids.  Please assist.           Can the clinic reply by [facility]: No           What Number to Call Back if not in [facility]: [phone]

## 2022-08-31 ENCOUNTER — OFFICE VISIT (OUTPATIENT)
Dept: INTERNAL MEDICINE | Facility: CLINIC | Age: 52
End: 2022-08-31
Payer: COMMERCIAL

## 2022-08-31 VITALS
OXYGEN SATURATION: 98 % | WEIGHT: 248.13 LBS | SYSTOLIC BLOOD PRESSURE: 120 MMHG | HEIGHT: 72 IN | BODY MASS INDEX: 33.61 KG/M2 | TEMPERATURE: 99 F | DIASTOLIC BLOOD PRESSURE: 80 MMHG | HEART RATE: 83 BPM

## 2022-08-31 DIAGNOSIS — I10 ESSENTIAL HYPERTENSION: ICD-10-CM

## 2022-08-31 DIAGNOSIS — R05.9 COUGH: ICD-10-CM

## 2022-08-31 DIAGNOSIS — E66.01 MORBID OBESITY: Primary | ICD-10-CM

## 2022-08-31 PROCEDURE — 3044F PR MOST RECENT HEMOGLOBIN A1C LEVEL <7.0%: ICD-10-PCS | Mod: CPTII,S$GLB,, | Performed by: INTERNAL MEDICINE

## 2022-08-31 PROCEDURE — 3074F SYST BP LT 130 MM HG: CPT | Mod: CPTII,S$GLB,, | Performed by: INTERNAL MEDICINE

## 2022-08-31 PROCEDURE — 3079F PR MOST RECENT DIASTOLIC BLOOD PRESSURE 80-89 MM HG: ICD-10-PCS | Mod: CPTII,S$GLB,, | Performed by: INTERNAL MEDICINE

## 2022-08-31 PROCEDURE — 99999 PR PBB SHADOW E&M-EST. PATIENT-LVL III: CPT | Mod: PBBFAC,,, | Performed by: INTERNAL MEDICINE

## 2022-08-31 PROCEDURE — 99213 PR OFFICE/OUTPT VISIT, EST, LEVL III, 20-29 MIN: ICD-10-PCS | Mod: S$GLB,,, | Performed by: INTERNAL MEDICINE

## 2022-08-31 PROCEDURE — 3044F HG A1C LEVEL LT 7.0%: CPT | Mod: CPTII,S$GLB,, | Performed by: INTERNAL MEDICINE

## 2022-08-31 PROCEDURE — 3008F BODY MASS INDEX DOCD: CPT | Mod: CPTII,S$GLB,, | Performed by: INTERNAL MEDICINE

## 2022-08-31 PROCEDURE — 3079F DIAST BP 80-89 MM HG: CPT | Mod: CPTII,S$GLB,, | Performed by: INTERNAL MEDICINE

## 2022-08-31 PROCEDURE — 4010F ACE/ARB THERAPY RXD/TAKEN: CPT | Mod: CPTII,S$GLB,, | Performed by: INTERNAL MEDICINE

## 2022-08-31 PROCEDURE — 99213 OFFICE O/P EST LOW 20 MIN: CPT | Mod: S$GLB,,, | Performed by: INTERNAL MEDICINE

## 2022-08-31 PROCEDURE — 4010F PR ACE/ARB THEARPY RXD/TAKEN: ICD-10-PCS | Mod: CPTII,S$GLB,, | Performed by: INTERNAL MEDICINE

## 2022-08-31 PROCEDURE — 3074F PR MOST RECENT SYSTOLIC BLOOD PRESSURE < 130 MM HG: ICD-10-PCS | Mod: CPTII,S$GLB,, | Performed by: INTERNAL MEDICINE

## 2022-08-31 PROCEDURE — 1159F MED LIST DOCD IN RCRD: CPT | Mod: CPTII,S$GLB,, | Performed by: INTERNAL MEDICINE

## 2022-08-31 PROCEDURE — 99999 PR PBB SHADOW E&M-EST. PATIENT-LVL III: ICD-10-PCS | Mod: PBBFAC,,, | Performed by: INTERNAL MEDICINE

## 2022-08-31 PROCEDURE — 3008F PR BODY MASS INDEX (BMI) DOCUMENTED: ICD-10-PCS | Mod: CPTII,S$GLB,, | Performed by: INTERNAL MEDICINE

## 2022-08-31 PROCEDURE — 1159F PR MEDICATION LIST DOCUMENTED IN MEDICAL RECORD: ICD-10-PCS | Mod: CPTII,S$GLB,, | Performed by: INTERNAL MEDICINE

## 2022-08-31 RX ORDER — LOSARTAN POTASSIUM 50 MG/1
50 TABLET ORAL DAILY
Qty: 90 TABLET | Refills: 3 | Status: SHIPPED | OUTPATIENT
Start: 2022-08-31 | End: 2023-06-10

## 2022-08-31 RX ORDER — NORGESTREL AND ETHINYL ESTRADIOL 0.3-0.03MG
KIT ORAL
COMMUNITY
Start: 2022-08-19 | End: 2022-10-13 | Stop reason: CLARIF

## 2022-08-31 NOTE — PROGRESS NOTES
[facility] Note    Chief Complaint      Chief Complaint   Patient presents with    Follow-up     6 weeks       History of Present Illness      Nell Sanches is a 51 y.o. female with chronic conditions of HTN, HLD, anxiety, thyroid nodule, vit d deficiency who presents today for: follow up weight loss regimen.  Has lost a few more lbs since last visit.  On ozempic 1 mg weekly.  No side effects.    Reports having heavy menstrual bleeding recently.  Has IUD in place.  Saw OBGYN, Dr. Gustafson, and started on OCP which has helped.  Had ultrasound which showed a small fibroid but did not identify the IUD.  Seeing Dr. Gustafson soon to further evaluate.    Also complains of cough, throat congestion, 4 weeks.  Denies wheezing, fevers, chills, shortness of breath.  Tried multiple OTC symptom control meds without resolution.  Saw urgent care and given steroid shot.  Symptoms improved briefly, but resumed.      Past Medical History:  Past Medical History:   Diagnosis Date    Abnormal weight gain 10/2/2012    Other and unspecified hyperlipidemia 10/2/2012    Unspecified vitamin D deficiency 10/2/2012       Past Surgical History:   has a past surgical history that includes Breast biopsy (Right, 2012).    Family History:  family history includes Colon cancer (age of onset: 70) in her maternal grandmother.     Social History:  Social History     Tobacco Use    Smoking status: Never    Smokeless tobacco: Never       I personally reviewed all past medical, surgical, social and family history.    Review of Systems   Constitutional:  Negative for chills, fever and malaise/fatigue.   Respiratory:  Negative for shortness of breath.    Cardiovascular:  Negative for chest pain.   Gastrointestinal:  Negative for constipation, diarrhea, nausea and vomiting.   Skin:  Negative for rash.   Neurological:  Negative for weakness.   All other systems reviewed and are negative.     Medications:  Outpatient Encounter Medications  as of 8/31/2022   Medication Sig Dispense Refill    b complex vitamins capsule Take 1 capsule by mouth once daily.      Ca/D3/mag ox/zinc//nancy/bor (CALCIUM 600-D3 PLUS ORAL) Take by mouth.      coenzyme Q10 200 mg capsule Take 200 mg by mouth once daily.      CRYSELLE, 28, 0.3-30 mg-mcg per tablet Take by mouth.      docosahexanoic acid/epa (FISH OIL ORAL) Take by mouth.      losartan (COZAAR) 50 MG tablet Take 1 tablet (50 mg total) by mouth once daily. 90 tablet 3    meloxicam (MOBIC) 15 MG tablet Take 15 mg by mouth once daily.      semaglutide (OZEMPIC) 1 mg/dose (4 mg/3 mL) Inject 1 mg into the skin every 7 days. 1 pen 3    triamterene-hydrochlorothiazide 37.5-25 mg (MAXZIDE-25) 37.5-25 mg per tablet Take 1 tablet by mouth once daily. 30 tablet 11    vit A/vit C/vit E/zinc/copper (ICAPS AREDS ORAL) Take by mouth.      [DISCONTINUED] lisinopriL (PRINIVIL,ZESTRIL) 20 MG tablet Take 1 tablet (20 mg total) by mouth once daily. 90 tablet 3     No facility-administered encounter medications on file as of 8/31/2022.       Allergies:  Review of patient's allergies indicates:   Allergen Reactions    Sulfa (sulfonamide antibiotics) Hives       Health Maintenance:  Immunization History   Administered Date(s) Administered    COVID-19, MRNA, LN-S, PF (MODERNA FULL 0.5 ML DOSE) 01/22/2021, 02/20/2021, 12/17/2021    Influenza 10/04/2012    Influenza - Quadrivalent - MDCK - PF 10/10/2018    Influenza - Quadrivalent - PF *Preferred* (6 months and older) 12/06/2017, 10/04/2019, 10/08/2020    Influenza - Trivalent - PF (ADULT) 10/04/2012    Influenza Split 12/06/2017    Tdap 10/04/2019      Health Maintenance   Topic Date Due    Hepatitis C Screening  Never done    Mammogram  12/10/2022    Lipid Panel  05/13/2023    TETANUS VACCINE  10/04/2029        Physical Exam      Vital Signs  Temp: 99.2 °F (37.3 °C)  Temp src: Oral  Pulse: 83  SpO2: 98 %  BP: 120/80  BP Location: Left arm  Patient Position: Sitting  Pain Score: 0-No  pain  Height and Weight  Height: 6' (182.9 cm)  Weight: 112.5 kg (248 lb 2 oz)  BSA (Calculated - sq m): 2.39 sq meters  BMI (Calculated): 33.6  Weight in (lb) to have BMI = 25: 183.9]    Physical Exam  Vitals reviewed.   Constitutional:       Appearance: She is well-developed.   HENT:      Head: Normocephalic and atraumatic.      Right Ear: External ear normal.      Left Ear: External ear normal.   Cardiovascular:      Rate and Rhythm: Normal rate and regular rhythm.      Heart sounds: Normal heart sounds. No murmur heard.  Pulmonary:      Effort: Pulmonary effort is normal.      Breath sounds: Normal breath sounds. No wheezing or rales.   Abdominal:      General: Bowel sounds are normal. There is no distension.      Palpations: Abdomen is soft.      Tenderness: There is no abdominal tenderness.        Laboratory:  CBC:  Recent Labs   Lab 10/29/19  0742 12/31/20  0712 05/13/22  0731   WBC 6.3 6.2 6.9   RBC 4.66 4.99 5.30   Hemoglobin 14.0 14.3 15.5   Hematocrit 41.2 43.9 46.3   Platelets 304 340 347   MCV 88.4 88.0 87.3   MCH 30.0 28.7 29.4   MCHC 34.0 32.6 33.6     CMP:  Recent Labs   Lab 10/29/19  0742 12/31/20  0712 05/13/22  0731   Glucose 86 94 102 H   Calcium 9.3 9.1 9.3   Albumin 4.0 4.2  --    ALBUMIN  --   --  5.0   Total Protein 6.8 6.5 7.5   Sodium 140 138 138   Potassium 4.7 4.7 4.2   CO2 30 29 23   Chloride 104 105 102   BUN 14 14 15.0   Alkaline Phosphatase 51  --  66   ALT 15 12 15   AST 16 12 14   Total Bilirubin 0.8 0.7 0.7     URINALYSIS:       LIPIDS:  Recent Labs   Lab 10/29/19  0742 12/31/20  0712 05/13/22  0731   TSH 2.75 2.66 2.59   HDL 49 L 51 56   Cholesterol 165 184 184   Triglycerides 91 63 88   LDL Calculated  --   --  114   LDL Cholesterol 97 118 H  --    HDL/Cholesterol Ratio 3.4 3.6  --    Non HDL Chol. (LDL+VLDL) 116 133 H  --      TSH:  Recent Labs   Lab 10/29/19  0742 12/31/20  0712 05/13/22  0731   TSH 2.75 2.66 2.59     A1C:  Recent Labs   Lab 12/31/20  0712 05/13/22  0733    Hemoglobin A1C 5.1 5.0       Assessment/Plan     Nell Sanches is a 51 y.o.female with:    1. Morbid obesity  Continue current meds.    2. Cough  3. Essential hypertension  - losartan (COZAAR) 50 MG tablet; Take 1 tablet (50 mg total) by mouth once daily.  Dispense: 90 tablet; Refill: 3   Change losartan to lisinopril to see if improves cough.    Chronic conditions status updated as per HPI.  Other than changes above, cont current medications and maintain follow up with specialists.  Follow up in about 3 months (around 11/30/2022) for Follow up visit.    Future Appointments   Date Time Provider Department Center   12/29/2022 10:00 AM Piedad Huang MD [facility] OBYANDEL Cabral MD  [facility]

## 2022-09-02 ENCOUNTER — NURSE TRIAGE (OUTPATIENT)
Dept: ADMINISTRATIVE | Facility: CLINIC | Age: 52
End: 2022-09-02
Payer: COMMERCIAL

## 2022-09-02 NOTE — TELEPHONE ENCOUNTER
Patient states she just want to know how long after taking lisinopril will the cough last . I told patient to at least give it a 2 week period  . If she have anymore questions please feel free to give us a call .

## 2022-09-02 NOTE — TELEPHONE ENCOUNTER
O[facility] NT incoming call -  Pt reports cough > 3 weeks seen by PCP 2 days ago and stopped lisinopril and started new BP med but cough has continued. Chronic cough protocol followed and pt advised  to see provider with in 2 weeks. Nt unable to schedule with in dispo time frame. OAC and RR offered and declined. Encounter routed to PCP.     Reason for Disposition   Cough lasts > 3 weeks    Additional Information   Negative: SEVERE difficulty breathing (e.g., struggling for each breath, speaks in single words)   Negative: [1] Lips or face are bluish now AND [2] persists when not coughing   Negative: Sounds like a life-threatening emergency to the triager   Negative: [1] MODERATE difficulty breathing (e.g., speaks in phrases, SOB even at rest, pulse 100-120) AND [2] still present when not coughing   Negative: Chest pain  (Exception: MILD central chest pain, present only when coughing)   Negative: [1] Increasing difficulty breathing AND [2] always has some difficulty breathing   Negative: Patient sounds very sick or weak to the triager   Negative: [1] MILD difficulty breathing (e.g., minimal/no SOB at rest, SOB with walking, pulse <100) AND [2] still present when not coughing (Exception: no change from usual, chronic shortness of breath)   Negative: [1] Coughed up blood AND [2] > 1 tablespoon (15 ml) (Exception: blood-tinged sputum)   Negative: Fever > 103 F (39.4 C)   Negative: [1] Fever > 101 F (38.3 C) AND [2] age > 60 years   Negative: [1] Fever > 100.0 F (37.8 C) AND [2] bedridden (e.g., nursing home patient, CVA, chronic illness, recovering from surgery)   Negative: [1] Fever > 100.0 F (37.8 C) AND [2] diabetes mellitus or weak immune system (e.g., HIV positive, cancer chemo, splenectomy, organ transplant, chronic steroids)   Negative: SEVERE coughing spells (e.g., whooping sound after coughing, vomiting after coughing)   Negative: [1] Continuous (nonstop) coughing interferes with work or school AND [2] no improvement  using cough treatment per protocol   Negative: Fever present > 3 days (72 hours)   Negative: Coughing up vanessa-colored sputum   Negative: Change in color of sputum  (e.g., from white to yellow-green sputum)   Negative: Increase in amount of sputum   Negative: Taking an ACE Inhibitor medication  (e.g., benazepril/LOTENSIN, captopril/CAPOTEN, enalapril/VASOTEC, lisinopril/ZESTRIL)   Negative: [1] Chest or rib pain AND [2] only occurs while coughing   Negative: Sinus pain or pressure (around cheekbone or eye)   Negative: [1] Nasal discharge AND [2] present > 10 days   Negative: [1] Blood-tinged sputum has been coughed up AND [2] more than once   Negative: History of asthma or has mild wheezing   Negative: Exposure to TB (Tuberculosis)   Negative: [1] History of gastric reflux AND [2] intermittent symptoms of sour taste in mouth AND [3] dry cough   Negative: [1] Dry lingering cough AND [2] recent cold symptoms  (e.g., runny nose, fever)    Protocols used: Cough - Chronic-A-AH

## 2022-10-04 NOTE — H&P
[facility] ([place])  History & Physical    Subjective:    Worsening pain right hip intolerable at this point admitted for right hip replacement.  She is young active.  Significant risk discussed      Patient Active Problem List    Diagnosis Date Noted    Anxiety 12/14/2020    Essential hypertension 10/04/2019    Thyroid nodule 10/04/2019    Vitamin D deficiency 10/02/2012    Mixed hyperlipidemia 10/02/2012     Past Medical History:   Diagnosis Date    Abnormal weight gain 10/2/2012    Other and unspecified hyperlipidemia 10/2/2012    Unspecified vitamin D deficiency 10/2/2012     Past Surgical History:   Procedure Laterality Date    BREAST BIOPSY Right 2012     No medications prior to admission.     Review of patient's allergies indicates:   Allergen Reactions    Sulfa (sulfonamide antibiotics) Hives     Social History     Tobacco Use    Smoking status: Never    Smokeless tobacco: Never   Substance Use Topics    Alcohol use: Not on file     Family History   Problem Relation Age of Onset    Colon cancer Maternal Grandmother 70     Social History     Tobacco Use    Smoking status: Never    Smokeless tobacco: Never       No medications prior to admission.     Review of patient's allergies indicates:   Allergen Reactions    Sulfa (sulfonamide antibiotics) Hives        Review of Systems:  All other systems reviewed and are negative.  .    No current facility-administered medications for this encounter.     Current Outpatient Medications   Medication Sig    b complex vitamins capsule Take 1 capsule by mouth once daily.    Ca/D3/mag ox/zinc//nancy/bor (CALCIUM 600-D3 PLUS ORAL) Take by mouth.    coenzyme Q10 200 mg capsule Take 200 mg by mouth once daily.    CRYSELLE, 28, 0.3-30 mg-mcg per tablet Take by mouth.    docosahexanoic acid/epa (FISH OIL ORAL) Take by mouth.    losartan (COZAAR) 50 MG tablet Take 1 tablet (50 mg total) by mouth once daily.    meloxicam (MOBIC) 15 MG tablet Take 15 mg by mouth once  daily.    semaglutide (OZEMPIC) 1 mg/dose (4 mg/3 mL) Inject 1 mg into the skin every 7 days.    triamterene-hydrochlorothiazide 37.5-25 mg (MAXZIDE-25) 37.5-25 mg per tablet Take 1 tablet by mouth once daily.    vit A/vit C/vit E/zinc/copper (ICAPS AREDS ORAL) Take by mouth.       Objective:      Vital Signs (Most Recent)       Vital Signs Range (Last 24H):  BP: ()/()   Arterial Line BP: ()/()     Physical Exam heart regular lungs clear limp decreased rotation right hip with pain    Imaging Review:   Loss of joint space sclerosis right hip   Assessment:      There are no hospital problems to display for this patient.      Plan:    The various methods of treatment have been discussed with the patient and family.   After consideration of risks, benefits and other options for treatment, the patient has consented to surgical interventions (risks especially with her age activity  ).  Questions were answered and Pre-op teaching was done by me.

## 2022-10-06 NOTE — DISCHARGE INSTRUCTIONS
Hip Replacement Discharge Instructions    IF YOU HAVE ANY CONCERNS AFTER YOUR SURGERY PLEASE CALL YOUR SURGEON   [phone]          Pain:  After surgery you may feel some pain in the operative leg groin area. This is normal. Your hip will likely have been injected with a numbing medicine  prior to completion of surgery for pain control.  You will also get a prescription for pain control before you leave the hospital. Administer your pain med with food to decrease side effects of nausea. Pain meds can cause constipation. Taking a stool softner is recommended.   Be aware of your ingestion of Tylenol if your pain med has this in it,you should not exceed 3000mg of acetaminophen as this can damage your liver.  Elevate your leg when sitting for comfort  Incision Care:  Some drainage from the incision in the first 72 hours is normal. If drainage is excessive, remove bandage,  pat dry, cover with sterile gauze, and secure with tape. Notify M.D. for excessive drainage.  Staples will be removed 14 days after surgery. If your incision is closed with steri strips, do not remove.  Activity:  See attached hip precautions and follow for 6 weeks (instructions found at the end of packet):  Perform exercises 3 times a day.  Use a hard, flat surface, such as a firm mattress, when exercising.  You may shower 2 days after surgery providing the dressing is waterproof.Support/help is mandatory during showering. If dressing becomes wet, replace with a new dressing. No bathing or swimming for 6 weeks or until incision is completely healed.  Wear sha hose for 3 weeks after surgery. You may remove for 1-2 hours during the day only.Send patient home with an extra pair sha hose.  Safety:  Add cushions to low chairs and car seats for elevation.  When getting in and out of a car, it is important to keep your leg straight and out to the side. Wear a seatbelt at all times.  You will be given a raised toilet seat (3-1 commode).  Use a walker,  cane, or crutches as long as M.JULIETTE recommends.  Possible Complications: Report to Surgeon  Infection  Unexpected redness  Persistent drainage  iii. Temperature ,can be treated with tylenol. Do not go to the emergency room or urgent care for a temperature, call your surgeon.   Additional swelling  Pain not controlled with current pain medicine  Blood clot : You will be placed on a blood thinner to reduce the risk of blood clots  Unusual pain  Red or discolored skin  Swelling  Unusual warm skin  Dislocation  Severe hip pain especially when moved  The injured leg is shorter than the uninjured leg  The injured leg lies in an abnormal position. In most cases the leg is bent at the hip, turned inward and pulled toward the middle of the body.

## 2022-10-13 ENCOUNTER — ANESTHESIA EVENT (OUTPATIENT)
Dept: SURGERY | Facility: OTHER | Age: 52
End: 2022-10-13
Payer: COMMERCIAL

## 2022-10-13 ENCOUNTER — HOSPITAL ENCOUNTER (OUTPATIENT)
Dept: PREADMISSION TESTING | Facility: OTHER | Age: 52
Discharge: HOME | End: 2022-10-13
Attending: ORTHOPAEDIC SURGERY
Payer: COMMERCIAL

## 2022-10-13 VITALS
RESPIRATION RATE: 16 BRPM | TEMPERATURE: 98 F | DIASTOLIC BLOOD PRESSURE: 71 MMHG | HEART RATE: 87 BPM | BODY MASS INDEX: 32.51 KG/M2 | WEIGHT: 240 LBS | OXYGEN SATURATION: 100 % | HEIGHT: 72 IN | SYSTOLIC BLOOD PRESSURE: 143 MMHG

## 2022-10-13 DIAGNOSIS — Z01.818 PREOP TESTING: Primary | ICD-10-CM

## 2022-10-13 LAB
ANION GAP SERPL CALC-SCNC: 7 MMOL/L (ref 8–16)
BACTERIA #/AREA URNS HPF: NORMAL /HPF
BASOPHILS # BLD AUTO: 0.06 K/UL (ref 0–0.2)
BASOPHILS NFR BLD: 0.7 % (ref 0–1.9)
BILIRUB UR QL STRIP: ABNORMAL
BUN SERPL-MCNC: 14 MG/DL (ref 6–20)
CALCIUM SERPL-MCNC: 9.2 MG/DL (ref 8.7–10.5)
CHLORIDE SERPL-SCNC: 102 MMOL/L (ref 95–110)
CLARITY UR: CLEAR
CO2 SERPL-SCNC: 28 MMOL/L (ref 23–29)
COLOR UR: YELLOW
CREAT SERPL-MCNC: 0.9 MG/DL (ref 0.5–1.4)
DIFFERENTIAL METHOD: ABNORMAL
EOSINOPHIL # BLD AUTO: 0.1 K/UL (ref 0–0.5)
EOSINOPHIL NFR BLD: 1.3 % (ref 0–8)
ERYTHROCYTE [DISTWIDTH] IN BLOOD BY AUTOMATED COUNT: 11.3 % (ref 11.5–14.5)
EST. GFR  (NO RACE VARIABLE): >60 ML/MIN/1.73 M^2
GLUCOSE SERPL-MCNC: 92 MG/DL (ref 70–110)
GLUCOSE UR QL STRIP: NEGATIVE
HCT VFR BLD AUTO: 44.7 % (ref 37–48.5)
HGB BLD-MCNC: 15.4 G/DL (ref 12–16)
HGB UR QL STRIP: ABNORMAL
IMM GRANULOCYTES # BLD AUTO: 0.03 K/UL (ref 0–0.04)
IMM GRANULOCYTES NFR BLD AUTO: 0.4 % (ref 0–0.5)
KETONES UR QL STRIP: ABNORMAL
LEUKOCYTE ESTERASE UR QL STRIP: NEGATIVE
LYMPHOCYTES # BLD AUTO: 2.6 K/UL (ref 1–4.8)
LYMPHOCYTES NFR BLD: 31.1 % (ref 18–48)
MCH RBC QN AUTO: 29.7 PG (ref 27–31)
MCHC RBC AUTO-ENTMCNC: 34.5 G/DL (ref 32–36)
MCV RBC AUTO: 86 FL (ref 82–98)
MICROSCOPIC COMMENT: NORMAL
MONOCYTES # BLD AUTO: 0.6 K/UL (ref 0.3–1)
MONOCYTES NFR BLD: 7.7 % (ref 4–15)
NEUTROPHILS # BLD AUTO: 4.9 K/UL (ref 1.8–7.7)
NEUTROPHILS NFR BLD: 58.8 % (ref 38–73)
NITRITE UR QL STRIP: NEGATIVE
NRBC BLD-RTO: 0 /100 WBC
PH UR STRIP: 5 [PH] (ref 5–8)
PLATELET # BLD AUTO: 359 K/UL (ref 150–450)
PMV BLD AUTO: 10.3 FL (ref 9.2–12.9)
POTASSIUM SERPL-SCNC: 4 MMOL/L (ref 3.5–5.1)
PROT UR QL STRIP: NEGATIVE
RBC # BLD AUTO: 5.18 M/UL (ref 4–5.4)
RBC #/AREA URNS HPF: 3 /HPF (ref 0–4)
SODIUM SERPL-SCNC: 137 MMOL/L (ref 136–145)
SP GR UR STRIP: 1.02 (ref 1–1.03)
SQUAMOUS #/AREA URNS HPF: 6 /HPF
URN SPEC COLLECT METH UR: ABNORMAL
UROBILINOGEN UR STRIP-ACNC: NEGATIVE EU/DL
WBC # BLD AUTO: 8.33 K/UL (ref 3.9–12.7)
WBC #/AREA URNS HPF: 2 /HPF (ref 0–5)

## 2022-10-13 PROCEDURE — 85025 COMPLETE CBC W/AUTO DIFF WBC: CPT | Performed by: ORTHOPAEDIC SURGERY

## 2022-10-13 PROCEDURE — 36415 COLL VENOUS BLD VENIPUNCTURE: CPT | Performed by: ORTHOPAEDIC SURGERY

## 2022-10-13 PROCEDURE — 93005 ELECTROCARDIOGRAM TRACING: CPT

## 2022-10-13 PROCEDURE — 93010 EKG 12-LEAD: ICD-10-PCS | Mod: ,,, | Performed by: INTERNAL MEDICINE

## 2022-10-13 PROCEDURE — 81000 URINALYSIS NONAUTO W/SCOPE: CPT | Performed by: ORTHOPAEDIC SURGERY

## 2022-10-13 PROCEDURE — 80048 BASIC METABOLIC PNL TOTAL CA: CPT | Performed by: ORTHOPAEDIC SURGERY

## 2022-10-13 PROCEDURE — 93010 ELECTROCARDIOGRAM REPORT: CPT | Mod: ,,, | Performed by: INTERNAL MEDICINE

## 2022-10-13 RX ORDER — ACETAMINOPHEN 500 MG
1000 TABLET ORAL
Status: CANCELLED | OUTPATIENT
Start: 2022-10-13 | End: 2022-10-13

## 2022-10-13 RX ORDER — OMEPRAZOLE 40 MG/1
40 CAPSULE, DELAYED RELEASE ORAL DAILY
COMMUNITY
End: 2023-03-09

## 2022-10-13 RX ORDER — PREGABALIN 50 MG/1
50 CAPSULE ORAL
Status: CANCELLED | OUTPATIENT
Start: 2022-10-13 | End: 2022-10-13

## 2022-10-13 RX ORDER — FAMOTIDINE 20 MG/1
20 TABLET, FILM COATED ORAL 2 TIMES DAILY
COMMUNITY
End: 2023-03-09

## 2022-10-13 RX ORDER — LIDOCAINE HYDROCHLORIDE 10 MG/ML
1 INJECTION, SOLUTION EPIDURAL; INFILTRATION; INTRACAUDAL; PERINEURAL ONCE
Status: CANCELLED | OUTPATIENT
Start: 2022-10-13 | End: 2022-10-13

## 2022-10-13 NOTE — ANESTHESIA PREPROCEDURE EVALUATION
10/13/2022  Nell Sanches is a 52 y.o., female.      Pre-op Assessment    I have reviewed the Patient Summary Reports.     I have reviewed the Nursing Notes. I have reviewed the NPO Status.   I have reviewed the Medications.     Review of Systems  Anesthesia Hx:  No previous Anesthesia  Denies Family Hx of Anesthesia complications.   Denies Personal Hx of Anesthesia complications.   Social:  Non-Smoker    Hematology/Oncology:  Hematology Normal   Oncology Normal     EENT/Dental:EENT/Dental Normal   Cardiovascular:   Hypertension    Pulmonary:  Pulmonary Normal    Renal/:  Renal/ Normal     Hepatic/GI:   GERD, well controlled    Musculoskeletal:   Arthritis     Neurological:  Neurology Normal    Endocrine:  Endocrine Normal  Obesity / BMI > 30  Dermatological:  Skin Normal    Psych:  Psychiatric Normal           Physical Exam  General: Cooperative, Alert and Oriented    Airway:  Mallampati: II   Mouth Opening: Normal  Neck ROM: Normal ROM    Dental:  Intact        Anesthesia Plan  Type of Anesthesia, risks & benefits discussed:    Anesthesia Type: Spinal  Intra-op Monitoring Plan: Standard ASA Monitors  Post Op Pain Control Plan: multimodal analgesia  ASA Score: 2  Anesthesia Plan Notes: Labs and EKG today    Ready For Surgery From Anesthesia Perspective.     .

## 2022-10-13 NOTE — DISCHARGE INSTRUCTIONS
Information to Prepare you for your Surgery    PRE-ADMIT TESTING -  [phone]    [address] [facility]          Your surgery has been scheduled at [facility]. We are pleased to have the opportunity to serve you. For Further Information please call [phone].    On the day of surgery please report to the Information Desk on the 1st floor.    CONTACT YOUR PHYSICIAN'S OFFICE THE DAY PRIOR TO YOUR SURGERY TO OBTAIN YOUR ARRIVAL TIME.     The evening before surgery do not eat anything after 9 p.m. ( this includes hard candy, chewing gum and mints).  You may only have GATORADE, POWERADE AND WATER  from 9 p.m. until you leave your home.   DO NOT DRINK ANY LIQUIDS ON THE WAY TO THE HOSPITAL.      Why does your anesthesiologist allow you to drink Gatorade/Powerade before surgery?  Gatorade/Powerade helps to increase your comfort before surgery and to decrease your nausea after surgery. The carbohydrates in Gatorade/Powerade help reduce your body's stress response to surgery.  If you are a diabetic-drink only water prior to surgery.      Current Visitor policy(12/27/2021) - Patients may have 2 visitors pre and post procedure. Only 2 visitors will be allowed in the Surgical building with the patient.     SPECIAL MEDICATION INSTRUCTIONS: TAKE medications checked off by the Anesthesiologist on your Medication List.    Angiogram Patients: Take medications as instructed by your physician, including aspirin.     Surgery Patients:    If you take ASPIRIN - Your PHYSICIAN/SURGEON will need to inform you IF/OR when you need to stop taking aspirin prior to your surgery.     Do Not take any medications containing IBUPROFEN.    Do Not Wear any make-up (especially eye make-up) to surgery. Please remove any false eyelashes or eyelash extensions. If you arrive the day of surgery with makeup/eyelashes on you will be required to remove prior to surgery. (There is a risk of corneal  abrasions if eye makeup/eyelash extensions are not removed)      Leave all valuables at home.   Do Not wear any jewelry or watches, including any metal in body piercings. Jewelry must be removed prior to coming to the hospital.  There is a possibility that rings that are unable to be removed may be cut off if they are on the surgical extremity.    Please remove all hair extensions, wigs, clips and any other metal accessories/ ornaments from your hair.  These items may pose a flammable/fire risk in Surgery and must be removed.    Do not shave your surgical area at least 5 days prior to your surgery. The surgical prep will be performed at the hospital according to Infection Control regulations.    Contact Lens must be removed before surgery. Either do not wear the contact lens or bring a case and solution for storage.  Please bring a container for eyeglasses or dentures as required.  Bring any paperwork your physician has provided, such as consent forms,  history and physicals, doctor's orders, etc.   Bring comfortable clothes that are loose fitting to wear upon discharge. Take into consideration the type of surgery being performed.  Maintain your diet as advised per your physician the day prior to surgery.      Adequate rest the night before surgery is advised.   Park in the Parking lot behind the hospital or in the [employer] [facility] across the street from the parking lot. Parking is complimentary.  If you will be discharged the same day as your procedure, please arrange for a responsible adult to drive you home or to accompany you if traveling by taxi.   YOU WILL NOT BE PERMITTED TO DRIVE OR TO LEAVE THE HOSPITAL ALONE AFTER SURGERY.   If you are being discharged the same day, it is strongly recommended that you arrange for someone to remain with you for the first 24 hrs following your surgery.    The Surgeon will speak to your family/visitor after your surgery regarding the outcome of your surgery and post op  care.  The Surgeon may speak to you after your surgery, but there is a possibility you may not remember the details.  Please check with your family members regarding the conversation with the Surgeon.    We strongly recommend whoever is bringing you home be present for discharge instructions.  This will ensure a thorough understanding for your post op home care.    ALL CHILDREN MUST ALWAYS BE ACCOMPANIED BY AN ADULT.    Visitors-Refer to current Visitor policy handouts.    Thank you for your cooperation.  The Staff of [facility].            Bathing Instructions with Hibiclens    Shower the evening before and morning of your procedure with Hibiclens:  Wash your face with water and your regular face wash/soap  Apply Hibiclens directly on your skin or on a wet washcloth and wash gently. When showering: Move away from the shower stream when applying Hibiclens to avoid rinsing off too soon.  Rinse thoroughly with warm water  Do not dilute Hibiclens        Dry off as usual, do not use any deodorant, powder, body lotions, perfume, after shave or cologne.

## 2022-10-25 ENCOUNTER — ANESTHESIA (OUTPATIENT)
Dept: SURGERY | Facility: OTHER | Age: 52
End: 2022-10-25
Payer: COMMERCIAL

## 2022-10-25 ENCOUNTER — HOSPITAL ENCOUNTER (OUTPATIENT)
Facility: OTHER | Age: 52
Discharge: HOME HEALTH | End: 2022-10-25
Attending: ORTHOPAEDIC SURGERY | Admitting: ORTHOPAEDIC SURGERY
Payer: COMMERCIAL

## 2022-10-25 VITALS
OXYGEN SATURATION: 100 % | RESPIRATION RATE: 16 BRPM | WEIGHT: 240 LBS | BODY MASS INDEX: 32.51 KG/M2 | HEIGHT: 72 IN | HEART RATE: 90 BPM | DIASTOLIC BLOOD PRESSURE: 69 MMHG | SYSTOLIC BLOOD PRESSURE: 130 MMHG | TEMPERATURE: 97 F

## 2022-10-25 DIAGNOSIS — M16.9 OA (OSTEOARTHRITIS) OF HIP: ICD-10-CM

## 2022-10-25 DIAGNOSIS — M16.11 UNILATERAL PRIMARY OSTEOARTHRITIS, RIGHT HIP: Primary | ICD-10-CM

## 2022-10-25 PROCEDURE — 63600175 PHARM REV CODE 636 W HCPCS: Performed by: NURSE ANESTHETIST, CERTIFIED REGISTERED

## 2022-10-25 PROCEDURE — 88304 TISSUE EXAM BY PATHOLOGIST: CPT | Mod: 26,,, | Performed by: STUDENT IN AN ORGANIZED HEALTH CARE EDUCATION/TRAINING PROGRAM

## 2022-10-25 PROCEDURE — 63600175 PHARM REV CODE 636 W HCPCS: Performed by: ORTHOPAEDIC SURGERY

## 2022-10-25 PROCEDURE — 71000039 HC RECOVERY, EACH ADD'L HOUR: Performed by: ORTHOPAEDIC SURGERY

## 2022-10-25 PROCEDURE — 88304 PR  SURG PATH,LEVEL III: ICD-10-PCS | Mod: 26,,, | Performed by: STUDENT IN AN ORGANIZED HEALTH CARE EDUCATION/TRAINING PROGRAM

## 2022-10-25 PROCEDURE — 97110 THERAPEUTIC EXERCISES: CPT

## 2022-10-25 PROCEDURE — 63600175 PHARM REV CODE 636 W HCPCS: Performed by: ANESTHESIOLOGY

## 2022-10-25 PROCEDURE — 36000710: Performed by: ORTHOPAEDIC SURGERY

## 2022-10-25 PROCEDURE — 97162 PT EVAL MOD COMPLEX 30 MIN: CPT

## 2022-10-25 PROCEDURE — 25000003 PHARM REV CODE 250: Performed by: ORTHOPAEDIC SURGERY

## 2022-10-25 PROCEDURE — 25000003 PHARM REV CODE 250: Performed by: NURSE ANESTHETIST, CERTIFIED REGISTERED

## 2022-10-25 PROCEDURE — 71000015 HC POSTOP RECOV 1ST HR: Performed by: ORTHOPAEDIC SURGERY

## 2022-10-25 PROCEDURE — 27201423 OPTIME MED/SURG SUP & DEVICES STERILE SUPPLY: Performed by: ORTHOPAEDIC SURGERY

## 2022-10-25 PROCEDURE — 88311 DECALCIFY TISSUE: CPT | Performed by: STUDENT IN AN ORGANIZED HEALTH CARE EDUCATION/TRAINING PROGRAM

## 2022-10-25 PROCEDURE — 88311 PR  DECALCIFY TISSUE: ICD-10-PCS | Mod: 26,,, | Performed by: STUDENT IN AN ORGANIZED HEALTH CARE EDUCATION/TRAINING PROGRAM

## 2022-10-25 PROCEDURE — 97530 THERAPEUTIC ACTIVITIES: CPT

## 2022-10-25 PROCEDURE — 71000033 HC RECOVERY, INTIAL HOUR: Performed by: ORTHOPAEDIC SURGERY

## 2022-10-25 PROCEDURE — 25000003 PHARM REV CODE 250: Performed by: ANESTHESIOLOGY

## 2022-10-25 PROCEDURE — 37000009 HC ANESTHESIA EA ADD 15 MINS: Performed by: ORTHOPAEDIC SURGERY

## 2022-10-25 PROCEDURE — 88304 TISSUE EXAM BY PATHOLOGIST: CPT | Performed by: STUDENT IN AN ORGANIZED HEALTH CARE EDUCATION/TRAINING PROGRAM

## 2022-10-25 PROCEDURE — 97165 OT EVAL LOW COMPLEX 30 MIN: CPT

## 2022-10-25 PROCEDURE — 97116 GAIT TRAINING THERAPY: CPT

## 2022-10-25 PROCEDURE — 36000711: Performed by: ORTHOPAEDIC SURGERY

## 2022-10-25 PROCEDURE — 71000016 HC POSTOP RECOV ADDL HR: Performed by: ORTHOPAEDIC SURGERY

## 2022-10-25 PROCEDURE — 37000008 HC ANESTHESIA 1ST 15 MINUTES: Performed by: ORTHOPAEDIC SURGERY

## 2022-10-25 PROCEDURE — C1776 JOINT DEVICE (IMPLANTABLE): HCPCS | Performed by: ORTHOPAEDIC SURGERY

## 2022-10-25 PROCEDURE — 88311 DECALCIFY TISSUE: CPT | Mod: 26,,, | Performed by: STUDENT IN AN ORGANIZED HEALTH CARE EDUCATION/TRAINING PROGRAM

## 2022-10-25 PROCEDURE — 63600175 PHARM REV CODE 636 W HCPCS

## 2022-10-25 PROCEDURE — P9045 ALBUMIN (HUMAN), 5%, 250 ML: HCPCS | Mod: JG | Performed by: NURSE ANESTHETIST, CERTIFIED REGISTERED

## 2022-10-25 DEVICE — ACTIS DUOFIX HIP PROSTHESIS (FEMORAL STEM 12/14 TAPER CEMENTLESS SIZE 7 STD COLLAR)  CE
Type: IMPLANTABLE DEVICE | Site: HIP | Status: FUNCTIONAL
Brand: ACTIS

## 2022-10-25 DEVICE — PINNACLE GRIPTION ACETABULAR SHELL SECTOR 52MM OD
Type: IMPLANTABLE DEVICE | Site: HIP | Status: FUNCTIONAL
Brand: PINNACLE GRIPTION

## 2022-10-25 DEVICE — BIOLOX DELTA TS CERAMIC FEMORAL HEAD 12/14 TAPER REVISION DIAMETER 36MM +1.5
Type: IMPLANTABLE DEVICE | Site: HIP | Status: FUNCTIONAL
Brand: BIOLOX DELTA

## 2022-10-25 DEVICE — PINNACLE HIP SOLUTIONS ALTRX POLYETHYLENE ACETABULAR LINER NEUTRAL 36MM ID 52MM OD
Type: IMPLANTABLE DEVICE | Site: HIP | Status: FUNCTIONAL
Brand: PINNACLE ALTRX

## 2022-10-25 RX ORDER — CYCLOBENZAPRINE HCL 5 MG
5 TABLET ORAL 3 TIMES DAILY PRN
Qty: 25 TABLET | Refills: 0 | Status: SHIPPED | OUTPATIENT
Start: 2022-10-25 | End: 2022-11-04

## 2022-10-25 RX ORDER — ACETAMINOPHEN 500 MG
1000 TABLET ORAL
Status: COMPLETED | OUTPATIENT
Start: 2022-10-25 | End: 2022-10-25

## 2022-10-25 RX ORDER — METOCLOPRAMIDE HYDROCHLORIDE 5 MG/ML
5 INJECTION INTRAMUSCULAR; INTRAVENOUS EVERY 6 HOURS PRN
Status: DISCONTINUED | OUTPATIENT
Start: 2022-10-25 | End: 2022-10-25 | Stop reason: HOSPADM

## 2022-10-25 RX ORDER — SODIUM CHLORIDE, SODIUM LACTATE, POTASSIUM CHLORIDE, CALCIUM CHLORIDE 600; 310; 30; 20 MG/100ML; MG/100ML; MG/100ML; MG/100ML
INJECTION, SOLUTION INTRAVENOUS CONTINUOUS
Status: DISCONTINUED | OUTPATIENT
Start: 2022-10-25 | End: 2022-10-25 | Stop reason: HOSPADM

## 2022-10-25 RX ORDER — ROPIVACAINE HYDROCHLORIDE 5 MG/ML
INJECTION, SOLUTION EPIDURAL; INFILTRATION; PERINEURAL
Status: COMPLETED | OUTPATIENT
Start: 2022-10-25 | End: 2022-10-25

## 2022-10-25 RX ORDER — LIDOCAINE HYDROCHLORIDE 10 MG/ML
1 INJECTION, SOLUTION EPIDURAL; INFILTRATION; INTRACAUDAL; PERINEURAL ONCE
Status: DISCONTINUED | OUTPATIENT
Start: 2022-10-25 | End: 2022-10-25 | Stop reason: HOSPADM

## 2022-10-25 RX ORDER — TRANEXAMIC ACID 100 MG/ML
INJECTION, SOLUTION INTRAVENOUS
Status: DISCONTINUED | OUTPATIENT
Start: 2022-10-25 | End: 2022-10-25

## 2022-10-25 RX ORDER — MEPERIDINE HYDROCHLORIDE 25 MG/ML
12.5 INJECTION INTRAMUSCULAR; INTRAVENOUS; SUBCUTANEOUS ONCE AS NEEDED
Status: COMPLETED | OUTPATIENT
Start: 2022-10-25 | End: 2022-10-25

## 2022-10-25 RX ORDER — ROPIVACAINE HYDROCHLORIDE 5 MG/ML
INJECTION, SOLUTION EPIDURAL; INFILTRATION; PERINEURAL
Status: DISCONTINUED | OUTPATIENT
Start: 2022-10-25 | End: 2022-10-25 | Stop reason: HOSPADM

## 2022-10-25 RX ORDER — ACETAMINOPHEN 325 MG/1
650 TABLET ORAL EVERY 4 HOURS PRN
Status: DISCONTINUED | OUTPATIENT
Start: 2022-10-25 | End: 2022-10-25 | Stop reason: HOSPADM

## 2022-10-25 RX ORDER — PREGABALIN 50 MG/1
50 CAPSULE ORAL
Status: COMPLETED | OUTPATIENT
Start: 2022-10-25 | End: 2022-10-25

## 2022-10-25 RX ORDER — LIDOCAINE HCL/PF 100 MG/5ML
SYRINGE (ML) INTRAVENOUS
Status: DISCONTINUED | OUTPATIENT
Start: 2022-10-25 | End: 2022-10-25

## 2022-10-25 RX ORDER — PROCHLORPERAZINE EDISYLATE 5 MG/ML
5 INJECTION INTRAMUSCULAR; INTRAVENOUS EVERY 30 MIN PRN
Status: DISCONTINUED | OUTPATIENT
Start: 2022-10-25 | End: 2022-10-25 | Stop reason: HOSPADM

## 2022-10-25 RX ORDER — CEFAZOLIN SODIUM 2 G/50ML
2 SOLUTION INTRAVENOUS
Status: COMPLETED | OUTPATIENT
Start: 2022-10-25 | End: 2022-10-25

## 2022-10-25 RX ORDER — NAPROXEN SODIUM 220 MG/1
81 TABLET, FILM COATED ORAL 2 TIMES DAILY
Status: DISCONTINUED | OUTPATIENT
Start: 2022-10-25 | End: 2022-10-25 | Stop reason: HOSPADM

## 2022-10-25 RX ORDER — PHENYLEPHRINE HYDROCHLORIDE 10 MG/ML
INJECTION INTRAVENOUS
Status: DISCONTINUED | OUTPATIENT
Start: 2022-10-25 | End: 2022-10-25

## 2022-10-25 RX ORDER — MIDAZOLAM HYDROCHLORIDE 1 MG/ML
INJECTION INTRAMUSCULAR; INTRAVENOUS
Status: DISCONTINUED | OUTPATIENT
Start: 2022-10-25 | End: 2022-10-25

## 2022-10-25 RX ORDER — OXYCODONE HYDROCHLORIDE 5 MG/1
10 TABLET ORAL ONCE
Status: COMPLETED | OUTPATIENT
Start: 2022-10-25 | End: 2022-10-25

## 2022-10-25 RX ORDER — SODIUM CHLORIDE 9 MG/ML
INJECTION, SOLUTION INTRAVENOUS CONTINUOUS
Status: ACTIVE | OUTPATIENT
Start: 2022-10-25

## 2022-10-25 RX ORDER — SODIUM CHLORIDE 0.9 % (FLUSH) 0.9 %
3 SYRINGE (ML) INJECTION
Status: DISCONTINUED | OUTPATIENT
Start: 2022-10-25 | End: 2022-10-25 | Stop reason: HOSPADM

## 2022-10-25 RX ORDER — HYDROMORPHONE HYDROCHLORIDE 2 MG/ML
0.4 INJECTION, SOLUTION INTRAMUSCULAR; INTRAVENOUS; SUBCUTANEOUS EVERY 5 MIN PRN
Status: DISCONTINUED | OUTPATIENT
Start: 2022-10-25 | End: 2022-10-25 | Stop reason: HOSPADM

## 2022-10-25 RX ORDER — HYDROCODONE BITARTRATE AND ACETAMINOPHEN 10; 325 MG/1; MG/1
1 TABLET ORAL EVERY 4 HOURS PRN
Status: DISCONTINUED | OUTPATIENT
Start: 2022-10-25 | End: 2022-10-25 | Stop reason: HOSPADM

## 2022-10-25 RX ORDER — PROPOFOL 10 MG/ML
VIAL (ML) INTRAVENOUS
Status: DISCONTINUED | OUTPATIENT
Start: 2022-10-25 | End: 2022-10-25

## 2022-10-25 RX ORDER — HYDROMORPHONE HYDROCHLORIDE 2 MG/ML
INJECTION, SOLUTION INTRAMUSCULAR; INTRAVENOUS; SUBCUTANEOUS
Status: DISCONTINUED | OUTPATIENT
Start: 2022-10-25 | End: 2022-10-25

## 2022-10-25 RX ORDER — ONDANSETRON 8 MG/1
8 TABLET, ORALLY DISINTEGRATING ORAL EVERY 8 HOURS PRN
Status: DISCONTINUED | OUTPATIENT
Start: 2022-10-25 | End: 2022-10-25 | Stop reason: HOSPADM

## 2022-10-25 RX ORDER — CYCLOBENZAPRINE HCL 5 MG
5 TABLET ORAL ONCE
Status: COMPLETED | OUTPATIENT
Start: 2022-10-25 | End: 2022-10-25

## 2022-10-25 RX ORDER — HYDROCODONE BITARTRATE AND ACETAMINOPHEN 10; 325 MG/1; MG/1
1 TABLET ORAL EVERY 6 HOURS PRN
Qty: 50 TABLET | Refills: 0 | Status: SHIPPED | OUTPATIENT
Start: 2022-10-25 | End: 2023-03-09

## 2022-10-25 RX ORDER — SODIUM CHLORIDE 0.9 % (FLUSH) 0.9 %
5 SYRINGE (ML) INJECTION
Status: DISCONTINUED | OUTPATIENT
Start: 2022-10-25 | End: 2022-10-25 | Stop reason: HOSPADM

## 2022-10-25 RX ORDER — PREGABALIN 75 MG/1
75 CAPSULE ORAL 2 TIMES DAILY
Qty: 60 CAPSULE | Refills: 6 | Status: SHIPPED | OUTPATIENT
Start: 2022-10-25 | End: 2023-03-09

## 2022-10-25 RX ORDER — PROPOFOL 10 MG/ML
VIAL (ML) INTRAVENOUS CONTINUOUS PRN
Status: DISCONTINUED | OUTPATIENT
Start: 2022-10-25 | End: 2022-10-25

## 2022-10-25 RX ORDER — HYDROCODONE BITARTRATE AND ACETAMINOPHEN 5; 325 MG/1; MG/1
1 TABLET ORAL EVERY 4 HOURS PRN
Status: DISCONTINUED | OUTPATIENT
Start: 2022-10-25 | End: 2022-10-25 | Stop reason: HOSPADM

## 2022-10-25 RX ORDER — OXYCODONE HYDROCHLORIDE 5 MG/1
5 TABLET ORAL
Status: DISCONTINUED | OUTPATIENT
Start: 2022-10-25 | End: 2022-10-25 | Stop reason: HOSPADM

## 2022-10-25 RX ORDER — MIDAZOLAM HYDROCHLORIDE 1 MG/ML
2 INJECTION INTRAMUSCULAR; INTRAVENOUS ONCE
Status: COMPLETED | OUTPATIENT
Start: 2022-10-25 | End: 2022-10-25

## 2022-10-25 RX ORDER — ALBUMIN HUMAN 50 G/1000ML
SOLUTION INTRAVENOUS CONTINUOUS PRN
Status: DISCONTINUED | OUTPATIENT
Start: 2022-10-25 | End: 2022-10-25

## 2022-10-25 RX ORDER — CEFAZOLIN SODIUM 1 G/3ML
2 INJECTION, POWDER, FOR SOLUTION INTRAMUSCULAR; INTRAVENOUS
Status: COMPLETED | OUTPATIENT
Start: 2022-10-25 | End: 2022-10-25

## 2022-10-25 RX ORDER — FENTANYL CITRATE 50 UG/ML
INJECTION, SOLUTION INTRAMUSCULAR; INTRAVENOUS
Status: DISCONTINUED | OUTPATIENT
Start: 2022-10-25 | End: 2022-10-25

## 2022-10-25 RX ADMIN — MIDAZOLAM HYDROCHLORIDE 2 MG: 1 INJECTION, SOLUTION INTRAMUSCULAR; INTRAVENOUS at 06:10

## 2022-10-25 RX ADMIN — PROPOFOL 30 MG: 10 INJECTION, EMULSION INTRAVENOUS at 06:10

## 2022-10-25 RX ADMIN — PROPOFOL 90 MG: 10 INJECTION, EMULSION INTRAVENOUS at 07:10

## 2022-10-25 RX ADMIN — OXYCODONE 10 MG: 5 TABLET ORAL at 09:10

## 2022-10-25 RX ADMIN — FENTANYL CITRATE 50 MCG: 50 INJECTION, SOLUTION INTRAMUSCULAR; INTRAVENOUS at 08:10

## 2022-10-25 RX ADMIN — SODIUM CHLORIDE, SODIUM LACTATE, POTASSIUM CHLORIDE, AND CALCIUM CHLORIDE: .6; .31; .03; .02 INJECTION, SOLUTION INTRAVENOUS at 07:10

## 2022-10-25 RX ADMIN — CEFAZOLIN SODIUM 2 G: 2 SOLUTION INTRAVENOUS at 01:10

## 2022-10-25 RX ADMIN — ROPIVACAINE HYDROCHLORIDE 3 ML: 5 INJECTION, SOLUTION EPIDURAL; INFILTRATION; PERINEURAL at 06:10

## 2022-10-25 RX ADMIN — ALBUMIN (HUMAN): 12.5 SOLUTION INTRAVENOUS at 08:10

## 2022-10-25 RX ADMIN — CEFAZOLIN 2 G: 330 INJECTION, POWDER, FOR SOLUTION INTRAMUSCULAR; INTRAVENOUS at 07:10

## 2022-10-25 RX ADMIN — LIDOCAINE HYDROCHLORIDE 50 MG: 20 INJECTION, SOLUTION INTRAVENOUS at 07:10

## 2022-10-25 RX ADMIN — SODIUM CHLORIDE, SODIUM LACTATE, POTASSIUM CHLORIDE, AND CALCIUM CHLORIDE: .6; .31; .03; .02 INJECTION, SOLUTION INTRAVENOUS at 06:10

## 2022-10-25 RX ADMIN — HYDROMORPHONE HYDROCHLORIDE 0.4 MG: 2 INJECTION INTRAMUSCULAR; INTRAVENOUS; SUBCUTANEOUS at 08:10

## 2022-10-25 RX ADMIN — MIDAZOLAM HYDROCHLORIDE 2 MG: 1 INJECTION, SOLUTION INTRAMUSCULAR; INTRAVENOUS at 09:10

## 2022-10-25 RX ADMIN — MEPERIDINE HYDROCHLORIDE 12.5 MG: 25 INJECTION INTRAMUSCULAR; INTRAVENOUS; SUBCUTANEOUS at 11:10

## 2022-10-25 RX ADMIN — FENTANYL CITRATE 50 MCG: 50 INJECTION, SOLUTION INTRAMUSCULAR; INTRAVENOUS at 07:10

## 2022-10-25 RX ADMIN — ONDANSETRON 8 MG: 8 TABLET, ORALLY DISINTEGRATING ORAL at 11:10

## 2022-10-25 RX ADMIN — PHENYLEPHRINE HYDROCHLORIDE 100 MCG: 10 INJECTION INTRAVENOUS at 08:10

## 2022-10-25 RX ADMIN — PROPOFOL 15 MG: 10 INJECTION, EMULSION INTRAVENOUS at 07:10

## 2022-10-25 RX ADMIN — TRANEXAMIC ACID 1000 MG: 100 INJECTION, SOLUTION INTRAVENOUS at 07:10

## 2022-10-25 RX ADMIN — HYDROCODONE BITARTRATE AND ACETAMINOPHEN 1 TABLET: 5; 325 TABLET ORAL at 01:10

## 2022-10-25 RX ADMIN — PREGABALIN 50 MG: 50 CAPSULE ORAL at 05:10

## 2022-10-25 RX ADMIN — CYCLOBENZAPRINE HYDROCHLORIDE 5 MG: 5 TABLET, FILM COATED ORAL at 01:10

## 2022-10-25 RX ADMIN — ACETAMINOPHEN 1000 MG: 500 TABLET, FILM COATED ORAL at 05:10

## 2022-10-25 RX ADMIN — PROPOFOL 125 MCG/KG/MIN: 10 INJECTION, EMULSION INTRAVENOUS at 06:10

## 2022-10-25 RX ADMIN — PROPOFOL 75 MCG/KG/MIN: 10 INJECTION, EMULSION INTRAVENOUS at 07:10

## 2022-10-25 NOTE — PLAN OF CARE
"   10/25/22 1149   Final Note   Assessment Type Final Discharge Note   Anticipated Discharge Disposition Home-Health   Hospital Resources/Appts/Education Provided Provided patient/caregiver with written discharge plan information;Appointments scheduled and added to AVS;Appointments scheduled by Navigator/Coordinator   Post-Acute Status   Post-Acute Authorization Home Health   Discharge Delays None known at this time   Pt states she lives independently at home.     Family to provide transportation home.    Patient will discharge with home health. "Family home health "    All d/c needs addressed from a CM perspective.           "

## 2022-10-25 NOTE — TRANSFER OF CARE
Anesthesia Transfer of Care Note    Patient: Nell Sanches    Procedure(s) Performed: Procedure(s) (LRB):  ARTHROPLASTY, HIP TOTAL (Right)    Patient location: PACU    Anesthesia Type: general    Transport from OR: Transported from OR on 2-3 L/min O2 by NC with adequate spontaneous ventilation    Post pain: adequate analgesia    Post assessment: no apparent anesthetic complications    Post vital signs: stable    Level of consciousness: awake    Nausea/Vomiting: no nausea/vomiting    Complications: none    Transfer of care protocol was followed      Last vitals:   Visit Vitals  BP (!) 156/78 (BP Location: Right arm, Patient Position: Sitting)   Pulse 82   Temp 36.8 °C (98.3 °F) (Oral)   Resp 18   Ht 6' (1.829 m)   Wt 108.9 kg (240 lb)   LMP 10/12/2022   SpO2 100%   Breastfeeding No   BMI 32.55 kg/m²

## 2022-10-25 NOTE — PT/OT/SLP EVAL
Physical Therapy Evaluation    Patient Name:  Nell Sanches   MRN:  [id number]    Recommendations:     Discharge Recommendations:  home health PT, home health OT   Discharge Equipment Recommendations: 3-in-1 commode, walker, rolling   Barriers to discharge: None    Assessment:     Nell Sanches is a 52 y.o. female admitted with a medical diagnosis of Unilateral primary osteoarthritis, right hip.  She presents with the following impairments/functional limitations:  weakness, gait instability, decreased coordination, decreased lower extremity function, pain, decreased ROM, edema, orthopedic precautions.    PT evaluation completed. Pt demonstrated ability to transfer safely from EOB, ambulate using a RW, and complete stair navigation with no significant issues. Pt required verbal cueing and demonstration for step-to gait pattern with RW as well as ascending/descending threshold step. Pt also needed reinforcement for decreasing toe out during static standing and with gait. She verbalized understanding of surgical precautions and ability to complete car transfer.     Pt appropriate for d/c at this time and would benefit from home health PT to improve strength and gait pattern as well as reinforce safety parameters following hip surgery.     Rehab Prognosis: Good; patient would benefit from acute skilled PT services to address these deficits and reach maximum level of function.    Recent Surgery: Procedure(s) (LRB):  ARTHROPLASTY, HIP TOTAL (Right) Day of Surgery    Plan:     During this hospitalization, patient to be seen BID to address the identified rehab impairments via gait training, therapeutic activities, therapeutic exercises, neuromuscular re-education and progress toward the following goals:    Plan of Care Expires:  11/01/22    Subjective     Chief Complaint: worried about not being able to navigate home/bathroom   Patient/Family Comments/goals: be independent with home navigation      Pain/Comfort:  Pain Rating 1: other (see comments) (did not give pain rating)  Location - Side 1: Right  Location - Orientation 1: anterior  Location 1: hip  Pain Addressed 1: Pre-medicate for activity, Reposition, Distraction, Cessation of Activity  Pain Rating Post-Intervention 1: other (see comments) (pain rating not verbalized)    Patients cultural, spiritual, Spiritism conflicts given the current situation: no    Living Environment:  1 step threshold to enter both front and back door of her home;  to assist with return to home     Prior to admission, patients level of function was independent.  Equipment used at home: none.  DME owned (not currently used): rolling walker and bedside commode.  Upon discharge, patient will have assistance from .    Objective:     Communicated with REYNALDO Núñez prior to session.  Patient found HOB elevated with peripheral IV  upon PT entry to room.    General Precautions: Standard,     Orthopedic Precautions:RLE weight bearing as tolerated   Braces: N/A  Respiratory Status: Room air    Exams:  Cognition:   Patient is oriented to name, , date, place, situation.  Pt follows approximately 100% of one step commands.    Mood: Pleasant and cooperative.   Musculoskeletal:  Posture: Protective guarding surgical joint. Maintained hip/foot ER with gait and standing posture.   LE ROM/Strength: 5/5 bilateral ankle dorsiflexion and plantarflexion, nonsurgical hip flexion and extension, and non surgical knee flexion and extension. Surgical hip flexion/extension and knee flexion/ext grossly 3+/5 but formal MMT deferred at this time. AROM surgical hip flexion limited to 90 degrees. AROM nonsurgical extremity WFL.  Neuromuscular:  Sensation: Intact to light touch bilateral LEs. Pt denied paresthesias.   Coordination/Tone/Reflexes: No impairments identified with functional mobility. No formal testing performed.   Balance: CGA for dynamic standing with bilateral UE support.    Visual-vestibular: No impairments identified with functional mobility. No formal testing performed.  Integument:  Visible skin intact and surgical extremity dressing clean and dry.   Cardiopulmonary:  Edema: Mild surgical extremity.    Color/temperature/pulses: WNL     Functional Mobility:  Bed Mobility:     Supine to Sit: supervision  Transfers:     Sit to Stand:  stand by assistance with rolling walker  Gait: ambulated 200 ft with RW using step-to gait pattern and CGA  Noted decreased toe-off with R LE and able to intermittently self-correct toe out   Stairs:  Pt ascended/descended 1 threshold stair(s) with Rolling Walker with no handrails with Stand-by Assistance.       AM-PAC 6 CLICK MOBILITY  Total Score:20       Treatment & Education:    Verbal cueing required for transfer sit<>stand from EOB and chair     Verbal cueing and demonstration needed for transitioning RW closer to threshold step prior to step up    Verbal and demonstration required for step-to gait pattern with short steps and maintaining R foot as lead leg and continuous cueing to decrease hip ER/toe out angle; noted knee buckling with step-through gait pattern as pt fatigued requiring her to return to step-to gait pattern    Verbal and tactile cueing given for Therex AROM heel slides and Quad sets     Pt performed supine therapeutic exercises for strengthening and to promote circulation, pressure relief, and functional ROM with verbal, visual and tactile cues for correct performance including:   Bilateral ankle pumps x 20 reps  Bilateral quad sets x 10 reps  Bilateral glute sets x 10 reps   R SAQ with towel roll x 10 reps   R heel slide AROM x 10 reps        Reviewed orthopedic precautions: Anterior approach (no hip ext, no hip ER) with patient. Patient verbalized understanding. Orthopedic precautions reinforced during session with verbal and tactile cues.      Patient left up in chair with all lines intact, call button in reach, and spouse  present.    GOALS:   Multidisciplinary Problems       Physical Therapy Goals       Not on file              Multidisciplinary Problems (Resolved)          Problem: Physical Therapy    Goal Priority Disciplines Outcome Goal Variances Interventions   Physical Therapy Goal   (Resolved)     PT, PT/OT Met     Description: Goals to be met by: 22     Patient will increase functional independence with mobility by performin. Supine to sit with supervision.   2. Sit to supine with supervision.   3. Sit<>stand transfer with supervision using rolling walker.   4. Gait > 150 feet with SBA using rolling walker.   5. Ascend/descend 1 threshold step without handrails with CGA using least restrictive AD.  6. Verbalize anterior hip precautions without verbal cues.                          History:     Past Medical History:   Diagnosis Date    Abnormal weight gain 10/02/2012    Achilles tendonitis     left    Arthritis     Digestive disorder     Gastro-esophageal reflux disease without esophagitis     Hypertension     Other and unspecified hyperlipidemia 10/02/2012    Unspecified vitamin D deficiency 10/02/2012       Past Surgical History:   Procedure Laterality Date    BREAST BIOPSY Right 2012     SECTION      CHOLECYSTECTOMY      COLONOSCOPY      HYSTEROSCOPY WITH DILATION AND CURETTAGE OF UTERUS      and iud retrieval       Time Tracking:     PT Received On: 10/25/22  PT Start Time: 1150     PT Stop Time: 1250  PT Total Time (min): 60 min     Billable Minutes: Evaluation 10, Gait Training 20, Therapeutic Activity 20, and Therapeutic Exercise 10      10/25/2022

## 2022-10-25 NOTE — PLAN OF CARE
CM met with the patient at the bedside. Patients Aime is also at the bedside.     Patient is alert and oriented with no communication barriers.     Patient denies having and DME at home. Patient is agreeable to a RW & BSC. Patient is agreeable to HH. CM talked to the patient about freedom of choice. Patient is agreeable to MD preferred provider. CM sent HH orders to [facility].       Patients PCP is correct on the face sheet. Patient choice pharmacy is bedside delivery.     Patients family will transport the patient home at discharge.     CM team will continue to follow.          10/25/22 1146   Discharge Assessment   Assessment Type Discharge Planning Assessment   Confirmed/corrected address, phone number and insurance Yes   Confirmed Demographics Correct on Facesheet   Source of Information patient;family;health record   Lives With significant other   Do you expect to return to your current living situation? Yes   Do you have help at home or someone to help you manage your care at home? Yes   Prior to hospitilization cognitive status: Alert/Oriented   Current cognitive status: Alert/Oriented   Walking or Climbing Stairs Difficulty none   Dressing/Bathing Difficulty none   Equipment Currently Used at Home none   Readmission within 30 days? No   Patient currently being followed by outpatient case management? No   Do you currently have service(s) that help you manage your care at home? No   Do you take prescription medications? Yes   Do you have prescription coverage? Yes   Do you have any problems affording any of your prescribed medications? No   Is the patient taking medications as prescribed? no   How do you get to doctors appointments? car, drives self;family or friend will provide   Are you on dialysis? No   Do you take coumadin? No   Discharge Plan A Home Health   Discharge Plan B Rehab;Skilled Nursing Facility   DME Needed Upon Discharge  bedside commode;walker, rolling   Discharge Plan discussed with:  Spouse/sig other;Patient   Discharge Barriers Identified None   Physical Activity   On average, how many days per week do you engage in moderate to strenuous exercise (like a brisk walk)? 0 days   On average, how many minutes do you engage in exercise at this level? 0 min   Financial Resource Strain   How hard is it for you to pay for the very basics like food, housing, medical care, and heating? Not hard   Housing Stability   In the last 12 months, was there a time when you were not able to pay the mortgage or rent on time? N   In the last 12 months, was there a time when you did not have a steady place to sleep or slept in a shelter (including now)? N   Transportation Needs   In the past 12 months, has lack of transportation kept you from medical appointments or from getting medications? no   In the past 12 months, has lack of transportation kept you from meetings, work, or from getting things needed for daily living? No   Food Insecurity   Within the past 12 months, you worried that your food would run out before you got the money to buy more. Never true   Within the past 12 months, the food you bought just didn't last and you didn't have money to get more. Never true   Stress   Do you feel stress - tense, restless, nervous, or anxious, or unable to sleep at night because your mind is troubled all the time - these days? Not at all   Social Connections   In a typical week, how many times do you talk on the phone with family, friends, or neighbors? More than 3   How often do you get together with friends or relatives? Three times   How often do you attend Anabaptist or Sikh services? Never   Do you belong to any clubs or organizations such as Anabaptist groups, unions, fraternal or athletic groups, or school groups? No   How often do you attend meetings of the clubs or organizations you belong to? Never   Are you , , , , never , or living with a partner?    Alcohol Use    Q1: How often do you have a drink containing alcohol? Never   Q2: How many drinks containing alcohol do you have on a typical day when you are drinking? None   Q3: How often do you have six or more drinks on one occasion? Never

## 2022-10-25 NOTE — PT/OT/SLP EVAL
"Occupational Therapy   Evaluation, Treatment and Discharge    Name: Nell Sanches  MRN: [id number]  Admitting Diagnosis:  Unilateral primary osteoarthritis, right hip  Recent Surgery: Procedure(s) (LRB):  ARTHROPLASTY, HIP TOTAL (Right) Day of Surgery    Recommendations:     Discharge Recommendations: home health OT, home health PT  Discharge Equipment Recommendations:  bedside commode, walker, rolling (Shower chair with arm rests vs tub bench per Home Health OT)  Barriers to discharge:       Assessment:     Nell Sanches is a 52 y.o. female with a medical diagnosis of Unilateral primary osteoarthritis, right hip.  She presents alert and agreeable to participating in OT evaluation and tx session with  present for caregiver education and training. Performance deficits affecting function: weakness, impaired endurance, impaired self care skills, impaired functional mobility, gait instability, impaired balance, pain, impaired skin, orthopedic precautions, edema, impaired joint extensibility, decreased ROM, decreased lower extremity function.      Rehab Prognosis: Good; patient would benefit from acute skilled OT services to address these deficits and reach maximum level of function.       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  Pt comfortable at rest at end of tx session with cryotherapy to right thigh.)    Patients cultural, spiritual, Gnosticist conflicts given the current situation: no    Objective:     Communicated with: nurse, prior to session.  Patient found up in chair with peripheral IV, cryotherapy upon OT entry to room.    General Precautions: Standard, fall   Orthopedic Precautions:RLE weight bearing as tolerated, RLE anterior precautions   Braces: N/A  Respiratory Status: Room air    Occupational Performance:  socks and gait belt    Bed Mobility:    NT    Functional Mobility/Transfers: Right anterior SURJIT precautions education and demonstration prior to pt performance.   Sit to stand: SBA with RW  Transfers: SBA with RW with verbal cues  Car Transfers: Education and OT demonstration of technique  Functional Mobility: No LOB, verbal cues at times to adhere to precautions, especially when stepping backwards    Activities of Daily Living: Right anterior SURJIT precautions education and demonstration prior to pt performance  Feeding: Indep  Grooming: SBA standing at sink; verbal cues for adhering to precautions and for correct placement of RW  UB Dressing: Set up  LB Dressing: Donning underwear and pants - Min A; Donning socks - Total A for right sock; Donning shoes - SBA for left foot and Min A for right foot  Toileting: SBA with verbal cues for adhering to precautions and safety strategies; education and demonstration on adhering to precautions during hygiene after BM; Set up for using BSC  and safety strategies to reduce fall risk    Cognitive/Visual Perceptual:  Cognitive/Psychosocial Skills:     -       Oriented to: Person, Place, Time, and  Situation   -       Follows Commands/attention:Follows multistep  commands  -       Communication: clear/fluent  -       Memory: No Deficits noted  -       Safety awareness/insight to disability: intact   -       Mood/Affect/Coping skills/emotional control: Cooperative, Anxious, and Pleasant    Physical Exam:  Sitting Balance: Good  Standing Balance: Needs RW; bilateral UE support, no LOB, slow movements; only slightly reaching off KATHY during ADL  UE AROM: WFL for ADL  UE Strength: WFL for ADL  UE Coordination: Good  Sensation: Light touch intact  Edema: right thigh      AMPAC 6 Click ADL:  AMPAC Total Score: 17    Treatment & Education:  Role of OT, POC, pt's right anterior SURJIT precautions and how to adhere to precautions during ADL and ADL mobility, safety strategies to reduce fall risk, use of DME and discharge recs    Patient left up in chair with all lines intact, call button in reach, nurse notified, and  present    GOALS:   Multidisciplinary Problems       Occupational Therapy Goals       Not on file              Multidisciplinary Problems (Resolved)          Problem: Occupational Therapy    Goal Priority Disciplines Outcome Interventions   Occupational Therapy Goal   (Resolved)     OT, PT/OT Met    Description: Goals to be met by: 10/25/2022      Patient will increase functional independence with ADLs by performing:    Demonstrate understanding of right anterior SURJIT precautions and how to adhere to precautions during LB self care tasks.  Demonstrate understanding of right anterior SURJIT  precautions and how to adhere to precautions during ADL mobility and transfers using RW.                          History:     Past Medical History:   Diagnosis Date    Abnormal weight gain 10/02/2012    Achilles tendonitis     left    Arthritis     Digestive disorder     Gastro-esophageal reflux disease without esophagitis     Hypertension     Other and unspecified hyperlipidemia 10/02/2012    Unspecified vitamin D  deficiency 10/02/2012         Past Surgical History:   Procedure Laterality Date    BREAST BIOPSY Right 2012     SECTION      CHOLECYSTECTOMY      COLONOSCOPY      HYSTEROSCOPY WITH DILATION AND CURETTAGE OF UTERUS      and iud retrieval       Time Tracking:     OT Date of Treatment: 10/25/22  OT Start Time: 1346  OT Stop Time: 1454  OT Total Time (min): 68 min    Billable Minutes:Evaluation 10  Self Care/Home Management 58    10/25/2022

## 2022-10-25 NOTE — PLAN OF CARE
Problem: Physical Therapy  Goal: Physical Therapy Goal  Description: Goals to be met by: 22     Patient will increase functional independence with mobility by performin. Supine to sit with supervision.   2. Sit to supine with supervision.   3. Sit<>stand transfer with supervision using rolling walker.   4. Gait > 150 feet with SBA using rolling walker.   5. Ascend/descend 1 threshold step without handrails with CGA using least restrictive AD.  6. Verbalize anterior hip precautions without verbal cues.     Outcome: Met       PT evaluation completed. Pt demonstrated ability to transfer safely from EOB, ambulate using a RW, and complete stair navigation with no significant issues. Pt required verbal cueing and demonstration for step-to gait pattern with RW as well as ascending/descending threshold step. Pt also needed reinforcement for decreasing toe out during static standing and with gait. She verbalized understanding of surgical precautions and ability to complete car transfer.     Pt appropriate for d/c at this time and would benefit from home health PT to improve strength and gait pattern as well as reinforce safety parameters following hip surgery.

## 2022-10-25 NOTE — OR NURSING
Pt's V/S stable on room air while in PACU. Denies pain, discomfort. Right hip incision site with dressing clean/dry/intact. Ice pack in place per order. Spinal block wearing off appropriately, pt able to move legs and sensation returning to calves and feet. Will continue to monitor. Tolerating PO fluids with no N/V. Will transfer to ACU shortly, safety precautions in place.

## 2022-10-25 NOTE — ANESTHESIA PROCEDURE NOTES
Spinal    Diagnosis: Osteo hip  Patient location during procedure: holding area  Start time: 10/25/2022 6:35 AM  Timeout: 10/25/2022 6:35 AM  End time: 10/25/2022 6:40 AM    Staffing  Authorizing Provider: Rehan Vanegas MD  Performing Provider: Rehan Vanegas MD    Preanesthetic Checklist  Completed: patient identified, IV checked, site marked, risks and benefits discussed, surgical consent, monitors and equipment checked, pre-op evaluation and timeout performed  Spinal Block  Patient position: sitting  Prep: ChloraPrep  Patient monitoring: heart rate, cardiac monitor, continuous pulse ox and frequent blood pressure checks  Approach: midline  Location: L3-4  Injection technique: single shot  CSF Fluid: clear free-flowing CSF  Needle  Needle type: Quincke   Needle gauge: 25 G  Needle length: 3.5 in  Additional Documentation: incremental injection, negative aspiration for heme and no paresthesia on injection  Needle localization: anatomical landmarks  Assessment  Sensory level: T5   Dermatomal levels determined by alcohol wipe  Ease of block: easy  Patient's tolerance of the procedure: comfortable throughout block  Medications:    Medications: ropivacaine (NAROPIN) injection 0.5% - Intraspinal   3 mL - 10/25/2022 6:39:00 AM

## 2022-10-25 NOTE — PLAN OF CARE
Nell Sanches has met all discharge criteria from Phase II. Vital Signs are stable, ambulating  without difficulty.Pain is now under control and tolerable for the pt. Pain score 3 at this time.  Discharge instructions given, patient verbalized understanding. Discharged from facility via wheelchair in stable condition.

## 2022-10-25 NOTE — BRIEF OP NOTE
"[facility] ([place])  Brief Operative Note    Surgery Date: 10/25/2022     Surgeon(s) and Role:     * Senia Javed MD - Primary    Assisting Surgeon: None    Pre-op Diagnosis:  Unilateral primary osteoarthritis, right hip [M16.11]    Post-op Diagnosis:  Post-Op Diagnosis Codes:     * Unilateral primary osteoarthritis, right hip [M16.11]    Procedure(s) (LRB):  ARTHROPLASTY, HIP TOTAL (Right)    Anesthesia: Choice    Operative Findings:  Osteoarthritis right hip    Estimated Blood Loss: 510 mL         Specimens:   Specimen (24h ago, onward)       Start     Ordered    10/25/22 0755  Specimen to Pathology, Surgery Orthopedics  Once        Comments: Pre-op Diagnosis: Unilateral primary osteoarthritis, right hip [M16.11]Procedure(s):ARTHROPLASTY, HIP TOTAL Number of specimens: 1Name of specimens: 1. Right femoral head     References:    Click here for ordering Quick Tip   Question Answer Comment   Procedure Type: Orthopedics    Specimen Class: Routine/Screening    Which provider would you like to cc? SENIA JAVED    Release to patient Immediate        10/25/22 0755                      Discharge Note    OUTCOME: Patient tolerated treatment/procedure well without complication and is now ready for discharge.    DISPOSITION: [facility] [place]    FINAL DIAGNOSIS:  Unilateral primary osteoarthritis, right hip    FOLLOWUP: In clinic    DISCHARGE INSTRUCTIONS:    Discharge Procedure Orders   WALKER FOR HOME USE     Order Specific Question Answer Comments   Type of Walker: Adult (5'4"-6'6")    With wheels? No    Height: 6' (1.829 m)    Weight: 108.9 kg (240 lb)    Length of need (1-99 months): 99    Please check all that apply: Patient is unable to safely ambulate without equipment.      3 IN 1 COMMODE FOR HOME USE     Order Specific Question Answer Comments   Type: Standard    Height: 6' (1.829 m)    Weight: 108.9 kg (240 lb)    Length of need (1-99 months): 99      Diet general     Call MD for:  " temperature >100.4     Call MD for:  persistent nausea and vomiting     Call MD for:  severe uncontrolled pain     Call MD for:  difficulty breathing, headache or visual disturbances     Call MD for:  redness, tenderness, or signs of infection (pain, swelling, redness, odor or green/yellow discharge around incision site)     Call MD for:  hives     Call MD for:  persistent dizziness or light-headedness     Activity as tolerated     Weight bearing as tolerated

## 2022-10-25 NOTE — OP NOTE
[facility] ([facility]  Surgery Department  Operative Note    SUMMARY     Date of Procedure: 10/25/2022     Procedure: Procedure(s) (LRB):  ARTHROPLASTY, HIP TOTAL (Right)     Surgeon(s) and Role:     * Gil Paulino MD - Primary    Assisting Surgeon: None    Pre-Operative Diagnosis: Unilateral primary osteoarthritis, right hip [M16.11]    Post-Operative Diagnosis: Post-Op Diagnosis Codes:     * Unilateral primary osteoarthritis, right hip [M16.11]    Anesthesia: Choice    Operative Findings (including complications, if any):  Osteoarthritis right hip    Description of Technical Procedures:  Patient underwent spinal anesthesia brought the operating room placed on the fracture table positioned appropriately got the C-arm lined up for the [employer] system set we had everything measured correctly.  She had incomplete block so she underwent general anesthesia with LMA.  After bathing was lined up with the C-arm the right hip was prepped in usual fashion.  Using 8 cm direct anterior approach sharp dissection made down to the subcutaneous tissue then sharp dissection down to the fascial layer fascial layer was incised and blunt retraction through the muscular interval blunt retractor placed lateral and medial.  Capsule was cleaned up then a capsulotomy was performed.  The anterior retractor was reposition.  Standard neck cut was performed.  Sizing appeared to be a 52.  So with progressive reaming up to 51 which had excellent central and peripheral fixation.  A 52 cup was then placed 41° of horizontal 29° of anteversion.  Slightly higher on the anteversion but her cup alignment was that way.  I did have any under hang or overhang was right on the lip.  Excellent purchase obtained.  Thirty-six liner placed.  Attention turned to the femur with a cookie cutter canal finer then progressive broaching up to a 7 which had no subsidence excellent fixation with a 71.5 standard showed +4 of offset and 0 change on the length  which was ideal.  She did not have any shortening preoperatively.  Final implants placed.  Again excellent stability lined up well on the C-arm and the appropriate length up.  1. Vicryl using the capsule.  1. Vicryl used on the fascial layer to 0 Vicryl subcutaneously and Steri-Strips on the skin.  0.5% ropivacaine was still soft tissues placed in bulky sterile dressing brought to come sterile fashion    Was performed with a poor recognition system    Significant Surgical Tasks Conducted by the Assistant(s), if Applicable:  Retraction    Estimated Blood Loss (EBL): 510 mL           Implants:   Implant Name Type Inv. Item Serial No.  Lot No. LRB No. Used Action   CUP ACET PINN 10D 32MM 52MM - [id number]  CUP ACET PINN 10D 32MM 52MM  [employer] [id number] Right 1 Implanted   LINE ACET PINN 36X52 ALTRX - [id number]  LINE ACET PINN 36X52 ALTRX  [employer] [id number] Right 1 Implanted   STEM ACTIS FEM COLLARED STD 7 - [id number]  STEM ACTIS FEM COLLARED STD 7  SYNTHES [id number] Right 1 Implanted   HEAD FEM CRMIC BIOLX 36MM +1.5 - [id number]  HEAD FEM CRMIC BIOLX 36MM +1.5  [employer] [id number] Right 1 Implanted       Specimens:   Specimen (24h ago, onward)       Start     Ordered    10/25/22 0755  Specimen to Pathology, Surgery Orthopedics  Once        Comments: Pre-op Diagnosis: Unilateral primary osteoarthritis, right hip [M16.11]Procedure(s):ARTHROPLASTY, HIP TOTAL Number of specimens: 1Name of specimens: 1. Right femoral head     References:    Click here for ordering Quick Tip   Question Answer Comment   Procedure Type: Orthopedics    Specimen Class: Routine/Screening    Which provider would you like to cc? SENIA JAVED    Release to patient Immediate        10/25/22 0755                            Condition: Good    Disposition: PACU - hemodynamically stable.    Attestation: I was present and scrubbed for the entire procedure.

## 2022-10-25 NOTE — ANESTHESIA POSTPROCEDURE EVALUATION
Anesthesia Post Evaluation    Patient: Nell Sanches    Procedure(s) Performed: Procedure(s) (LRB):  ARTHROPLASTY, HIP TOTAL (Right)    Final Anesthesia Type: general      Patient location during evaluation: PACU  Patient participation: Yes- Able to Participate  Level of consciousness: awake and alert  Post-procedure vital signs: reviewed and stable  Pain management: adequate  Airway patency: patent    PONV status at discharge: No PONV  Anesthetic complications: no      Cardiovascular status: blood pressure returned to baseline  Respiratory status: unassisted, spontaneous ventilation and room air  Hydration status: euvolemic  Follow-up not needed.          Vitals Value Taken Time   /86 10/25/22 1115   Temp 36.3 °C (97.4 °F) 10/25/22 1045   Pulse 78 10/25/22 1115   Resp 16 10/25/22 1115   SpO2 99 % 10/25/22 1115         Event Time   Out of Recovery 10:38:36         Pain/Raúl Score: Pain Rating Prior to Med Admin: 3 (10/25/2022  9:50 AM)  Raúl Score: 10 (10/25/2022 11:15 AM)

## 2022-10-25 NOTE — PLAN OF CARE
Problem: Occupational Therapy  Goal: Occupational Therapy Goal  Description: Goals to be met by: 10/25/2022      Patient will increase functional independence with ADLs by performing:    Demonstrate understanding of right anterior SURJIT precautions and how to adhere to precautions during LB self care tasks.  Demonstrate understanding of right anterior SURJIT  precautions and how to adhere to precautions during ADL mobility and transfers using RW.     Outcome: Met   Evaluation complete and goals established.  Pt and  receptive to all education and training on pt's hip precautions and how to adhere to precautions during ADL and ADL mobility with use of DME.  Recommend discharge to home with 24/7 assist from  and Home Health OT and PT.  Pt's Home Health OT to recommend appropriate DME for tub/shower once she progresses with therapy in home setting.

## 2022-11-01 LAB
FINAL PATHOLOGIC DIAGNOSIS: NORMAL
GROSS: NORMAL
Lab: NORMAL
MICROSCOPIC EXAM: NORMAL

## 2022-11-30 ENCOUNTER — OFFICE VISIT (OUTPATIENT)
Dept: INTERNAL MEDICINE | Facility: CLINIC | Age: 52
End: 2022-11-30
Payer: COMMERCIAL

## 2022-11-30 VITALS
RESPIRATION RATE: 18 BRPM | HEART RATE: 88 BPM | DIASTOLIC BLOOD PRESSURE: 88 MMHG | OXYGEN SATURATION: 97 % | WEIGHT: 241.19 LBS | SYSTOLIC BLOOD PRESSURE: 122 MMHG | HEIGHT: 72 IN | TEMPERATURE: 98 F | BODY MASS INDEX: 32.67 KG/M2

## 2022-11-30 DIAGNOSIS — Z23 NEED FOR VACCINATION: ICD-10-CM

## 2022-11-30 DIAGNOSIS — E66.01 MORBID OBESITY: ICD-10-CM

## 2022-11-30 DIAGNOSIS — E78.2 MIXED HYPERLIPIDEMIA: ICD-10-CM

## 2022-11-30 DIAGNOSIS — R73.01 IMPAIRED FASTING GLUCOSE: ICD-10-CM

## 2022-11-30 DIAGNOSIS — I10 ESSENTIAL HYPERTENSION: Primary | ICD-10-CM

## 2022-11-30 PROCEDURE — 1159F MED LIST DOCD IN RCRD: CPT | Mod: CPTII,S$GLB,, | Performed by: INTERNAL MEDICINE

## 2022-11-30 PROCEDURE — 3079F PR MOST RECENT DIASTOLIC BLOOD PRESSURE 80-89 MM HG: ICD-10-PCS | Mod: CPTII,S$GLB,, | Performed by: INTERNAL MEDICINE

## 2022-11-30 PROCEDURE — 99999 PR PBB SHADOW E&M-EST. PATIENT-LVL IV: CPT | Mod: PBBFAC,,, | Performed by: INTERNAL MEDICINE

## 2022-11-30 PROCEDURE — 90471 IMMUNIZATION ADMIN: CPT | Mod: S$GLB,,, | Performed by: INTERNAL MEDICINE

## 2022-11-30 PROCEDURE — 4010F ACE/ARB THERAPY RXD/TAKEN: CPT | Mod: CPTII,S$GLB,, | Performed by: INTERNAL MEDICINE

## 2022-11-30 PROCEDURE — 99214 PR OFFICE/OUTPT VISIT, EST, LEVL IV, 30-39 MIN: ICD-10-PCS | Mod: 25,S$GLB,, | Performed by: INTERNAL MEDICINE

## 2022-11-30 PROCEDURE — 3044F PR MOST RECENT HEMOGLOBIN A1C LEVEL <7.0%: ICD-10-PCS | Mod: CPTII,S$GLB,, | Performed by: INTERNAL MEDICINE

## 2022-11-30 PROCEDURE — 99999 PR PBB SHADOW E&M-EST. PATIENT-LVL IV: ICD-10-PCS | Mod: PBBFAC,,, | Performed by: INTERNAL MEDICINE

## 2022-11-30 PROCEDURE — 99214 OFFICE O/P EST MOD 30 MIN: CPT | Mod: 25,S$GLB,, | Performed by: INTERNAL MEDICINE

## 2022-11-30 PROCEDURE — 90686 FLU VACCINE (QUAD) GREATER THAN OR EQUAL TO 3YO PRESERVATIVE FREE IM: ICD-10-PCS | Mod: S$GLB,,, | Performed by: INTERNAL MEDICINE

## 2022-11-30 PROCEDURE — 4010F PR ACE/ARB THEARPY RXD/TAKEN: ICD-10-PCS | Mod: CPTII,S$GLB,, | Performed by: INTERNAL MEDICINE

## 2022-11-30 PROCEDURE — 90471 FLU VACCINE (QUAD) GREATER THAN OR EQUAL TO 3YO PRESERVATIVE FREE IM: ICD-10-PCS | Mod: S$GLB,,, | Performed by: INTERNAL MEDICINE

## 2022-11-30 PROCEDURE — 3074F SYST BP LT 130 MM HG: CPT | Mod: CPTII,S$GLB,, | Performed by: INTERNAL MEDICINE

## 2022-11-30 PROCEDURE — 90686 IIV4 VACC NO PRSV 0.5 ML IM: CPT | Mod: S$GLB,,, | Performed by: INTERNAL MEDICINE

## 2022-11-30 PROCEDURE — 3008F BODY MASS INDEX DOCD: CPT | Mod: CPTII,S$GLB,, | Performed by: INTERNAL MEDICINE

## 2022-11-30 PROCEDURE — 3044F HG A1C LEVEL LT 7.0%: CPT | Mod: CPTII,S$GLB,, | Performed by: INTERNAL MEDICINE

## 2022-11-30 PROCEDURE — 3079F DIAST BP 80-89 MM HG: CPT | Mod: CPTII,S$GLB,, | Performed by: INTERNAL MEDICINE

## 2022-11-30 PROCEDURE — 3074F PR MOST RECENT SYSTOLIC BLOOD PRESSURE < 130 MM HG: ICD-10-PCS | Mod: CPTII,S$GLB,, | Performed by: INTERNAL MEDICINE

## 2022-11-30 PROCEDURE — 3008F PR BODY MASS INDEX (BMI) DOCUMENTED: ICD-10-PCS | Mod: CPTII,S$GLB,, | Performed by: INTERNAL MEDICINE

## 2022-11-30 PROCEDURE — 1159F PR MEDICATION LIST DOCUMENTED IN MEDICAL RECORD: ICD-10-PCS | Mod: CPTII,S$GLB,, | Performed by: INTERNAL MEDICINE

## 2022-11-30 NOTE — PROGRESS NOTES
[facility] Note    Chief Complaint      Chief Complaint   Patient presents with    Follow-up     3m       History of Present Illness      Nell Sanches is a 52 y.o. female with chronic conditions of HTN, HLD, anxiety, thyroid nodule, vit d deficiency who presents today for: follow up chronic conditions.  Since last visit, had right hip replacement without complication.  Following with PT.    Has been having success with ozempic.  Has lost 14 lbs since starting.  Interested in trying to increase to 2 mg when this prescription runs out.    BP at goal on losartan.  Changed from lisinopril 2/2 dry cough.  Did not resolve.  Saw Dr. Mcduffie, ENT who suggested GERD as etiology.  Has been taking antacids.  Has been resolved in the past month.      Past Medical History:  Past Medical History:   Diagnosis Date    Abnormal weight gain 10/02/2012    Achilles tendonitis     left    Arthritis     Digestive disorder     Gastro-esophageal reflux disease without esophagitis     Hypertension     Other and unspecified hyperlipidemia 10/02/2012    Unspecified vitamin D deficiency 10/02/2012       Past Surgical History:   has a past surgical history that includes Breast biopsy (Right, );  section; Cholecystectomy; Colonoscopy; Hysteroscopy with dilation and curettage of uterus; and Hip Arthroplasty (Right, 10/25/2022).    Family History:  family history includes Colon cancer (age of onset: 70) in her maternal grandmother.     Social History:  Social History     Tobacco Use    Smoking status: Never    Smokeless tobacco: Never   Substance Use Topics    Alcohol use: Yes     Comment: rare    Drug use: Never       I personally reviewed all past medical, surgical, social and family history.    Review of Systems   Constitutional:  Negative for chills, fever and malaise/fatigue.   Respiratory:  Negative for shortness of breath.    Cardiovascular:  Negative for chest pain.   Gastrointestinal:  Negative for  constipation, diarrhea, nausea and vomiting.   Skin:  Negative for rash.   Neurological:  Negative for weakness.   All other systems reviewed and are negative.     Medications:  Outpatient Encounter Medications as of 11/30/2022   Medication Sig Dispense Refill    calcium carb/vitamin D3/vit K1 (SOFT CHEWS CALCIUM ORAL) Take by mouth.      losartan (COZAAR) 50 MG tablet Take 1 tablet (50 mg total) by mouth once daily. 90 tablet 3    meloxicam (MOBIC) 15 MG tablet Take 15 mg by mouth once daily.      omeprazole (PRILOSEC) 40 MG capsule Take 40 mg by mouth once daily.      semaglutide (OZEMPIC) 1 mg/dose (4 mg/3 mL) INJECT 1MG INTGO THE SKIN EVERY 7 DAYS 3 pen 3    triamterene-hydrochlorothiazide 37.5-25 mg (MAXZIDE-25) 37.5-25 mg per tablet Take 1 tablet by mouth once daily. 30 tablet 11    UBIQUINOL, BULK, MISC by Misc.(Non-Drug; Combo Route) route.      famotidine (PEPCID) 20 MG tablet Take 20 mg by mouth 2 (two) times daily.      HYDROcodone-acetaminophen (NORCO)  mg per tablet Take 1 tablet by mouth every 6 (six) hours as needed for Pain. (Patient not taking: Reported on 11/30/2022) 50 tablet 0    pregabalin (LYRICA) 75 MG capsule Take 1 capsule (75 mg total) by mouth 2 (two) times daily. (Patient not taking: Reported on 11/30/2022) 60 capsule 6     Facility-Administered Encounter Medications as of 11/30/2022   Medication Dose Route Frequency Provider Last Rate Last Admin    0.9%  NaCl infusion   Intravenous Continuous Gil Paulino MD           Allergies:  Review of patient's allergies indicates:   Allergen Reactions    Sulfa (sulfonamide antibiotics) Hives       Health Maintenance:  Immunization History   Administered Date(s) Administered    COVID-19, MRNA, LN-S, PF (MODERNA FULL 0.5 ML DOSE) 01/22/2021, 02/20/2021, 12/17/2021    Influenza 10/04/2012    Influenza - Quadrivalent - MDCK - PF 10/10/2018    Influenza - Quadrivalent - PF *Preferred* (6 months and older) 12/06/2017, 10/04/2019, 10/08/2020,  11/30/2022    Influenza - Trivalent - PF (ADULT) 10/04/2012    Influenza Split 12/06/2017    Tdap 10/04/2019      Health Maintenance   Topic Date Due    Hepatitis C Screening  Never done    Mammogram  12/10/2022    Lipid Panel  05/13/2023    TETANUS VACCINE  10/04/2029        Physical Exam      Vital Signs  Temp: 97.6 °F (36.4 °C)  Temp src: Oral  Pulse: 88  Resp: 18  SpO2: 97 %  BP: 122/88  BP Location: Left arm  Patient Position: Sitting  Pain Score: 0-No pain  Height and Weight  Height: 6' (182.9 cm)  Weight: 109.4 kg (241 lb 2.9 oz)  BSA (Calculated - sq m): 2.36 sq meters  BMI (Calculated): 32.7  Weight in (lb) to have BMI = 25: 183.9]    Physical Exam  Vitals reviewed.   Constitutional:       Appearance: She is well-developed.   HENT:      Head: Normocephalic and atraumatic.      Right Ear: External ear normal.      Left Ear: External ear normal.   Cardiovascular:      Rate and Rhythm: Normal rate and regular rhythm.      Heart sounds: Normal heart sounds. No murmur heard.  Pulmonary:      Effort: Pulmonary effort is normal.      Breath sounds: Normal breath sounds. No wheezing or rales.   Abdominal:      General: Bowel sounds are normal. There is no distension.      Palpations: Abdomen is soft.      Tenderness: There is no abdominal tenderness.        Laboratory:  CBC:  Recent Labs   Lab 12/31/20 0712 05/13/22  0731 10/13/22  1402   WBC 6.2 6.9 8.33   RBC 4.99 5.30 5.18   Hemoglobin 14.3 15.5 15.4   Hematocrit 43.9 46.3 44.7   Platelets 340 347 359   MCV 88.0 87.3 86   MCH 28.7 29.4 29.7   MCHC 32.6 33.6 34.5     CMP:  Recent Labs   Lab 12/31/20  0712 05/13/22  0731 10/13/22  1402   Glucose 94 102 H 92   Calcium 9.1 9.3 9.2   Albumin 4.2  --   --    ALBUMIN  --  5.0  --    Total Protein 6.5 7.5  --    Sodium 138 138 137   Potassium 4.7 4.2 4.0   CO2 29 23 28   Chloride 105 102 102   BUN 14 15.0 14   Alkaline Phosphatase  --  66  --    ALT 12 15  --    AST 12 14  --    Total Bilirubin 0.7 0.7  --       URINALYSIS:  Recent Labs   Lab 10/13/22  1315   Color, UA Yellow   Specific Gravity, UA 1.025   pH, UA 5.0   Protein, UA Negative   Bacteria Rare   Nitrite, UA Negative   Leukocytes, UA Negative   Urobilinogen, UA Negative      LIPIDS:  Recent Labs   Lab 12/31/20  0712 05/13/22  0731   TSH 2.66 2.59   HDL 51 56   Cholesterol 184 184   Triglycerides 63 88   LDL Calculated  --  114   LDL Cholesterol 118 H  --    HDL/Cholesterol Ratio 3.6  --    Non HDL Chol. (LDL+VLDL) 133 H  --      TSH:  Recent Labs   Lab 12/31/20  0712 05/13/22  0731   TSH 2.66 2.59     A1C:  Recent Labs   Lab 12/31/20 0712 05/13/22  0733   Hemoglobin A1C 5.1 5.0       Assessment/Plan     Nell Sanches is a 52 y.o.female with:    1. Essential hypertension  Continue current meds.    2. Mixed hyperlipidemia  Continue current meds.    3. Impaired fasting glucose  Stable.  4. Morbid obesity  Continue current meds.    5. Need for vaccination  - Influenza - Quadrivalent (PF)     Chronic conditions status updated as per HPI.  Other than changes above, cont current medications and maintain follow up with specialists.  No follow-ups on file.    Future Appointments   Date Time Provider Department Center   12/29/2022 10:00 AM Piedad Huang MD [facility]   3/9/2023 11:30 AM Juanito Cabral MD [facility]       Juanito Cabral MD  [facility]

## 2022-12-23 DIAGNOSIS — E66.01 MORBID OBESITY: ICD-10-CM

## 2022-12-23 RX ORDER — SEMAGLUTIDE 1.34 MG/ML
1 INJECTION, SOLUTION SUBCUTANEOUS
Qty: 3 PEN | Refills: 3 | Status: SHIPPED | OUTPATIENT
Start: 2022-12-23 | End: 2022-12-29

## 2022-12-23 NOTE — TELEPHONE ENCOUNTER
----- Message from Queenie Barnes sent at 12/23/2022  9:18 AM CST -----  Contact: pt [phone]  Stated that she was told to call you to get the Rx semaglutide (OZEMPIC) 1 mg/dose (4 mg/3 mL) in the 2 mg/dose.    [id number] - [place] [address] & [address]  [place] [address]  Phone: [phone] Fax: [phone]    Thank you

## 2022-12-23 NOTE — TELEPHONE ENCOUNTER
No new care gaps identified.  [facility] Embedded Care Gaps. Reference number: [id number]. 12/23/2022   9:23:59 AM CST

## 2022-12-28 DIAGNOSIS — Z12.31 OTHER SCREENING MAMMOGRAM: ICD-10-CM

## 2022-12-29 ENCOUNTER — TELEPHONE (OUTPATIENT)
Dept: INTERNAL MEDICINE | Facility: CLINIC | Age: 52
End: 2022-12-29
Payer: COMMERCIAL

## 2022-12-29 DIAGNOSIS — E66.01 MORBID OBESITY: ICD-10-CM

## 2022-12-29 RX ORDER — SEMAGLUTIDE 2.68 MG/ML
2 INJECTION, SOLUTION SUBCUTANEOUS
Qty: 3 PEN | Refills: 2 | Status: SHIPPED | OUTPATIENT
Start: 2022-12-29 | End: 2023-03-09

## 2022-12-29 NOTE — TELEPHONE ENCOUNTER
----- Message from Purvi Singleton sent at 12/29/2022 11:43 AM CST -----  Contact: [phone]  Requesting an RX refill or new RX.  Is this a refill or new RX: New  RX name and strength (copy/paste from chart):  semaglutide (OZEMPIC) 2 mg/dose (4 mg/3 mL)  Is this a 30 day or 90 day RX:   Pharmacy name and phone # (copy/paste from chart):    [facility][id number] - [place], [place] - [address] [place] [address] AT [facility] & [address] [address]  Phone: [phone] Fax: [phone]    The doctors have asked that we provide their patients with the following 2 reminders -- prescription refills can take up to 72 hours, and a friendly reminder that in the future you can use your [facility] account to request refills: call back     Pt needs a call back and said she has been calling about this semaglutide (OZEMPIC) 1 mg/dose (4 mg/3 mL) that is now 2 mg not 1 mg that she needs a refill on. Please call pt back.

## 2023-03-09 ENCOUNTER — OFFICE VISIT (OUTPATIENT)
Dept: PRIMARY CARE CLINIC | Facility: CLINIC | Age: 53
End: 2023-03-09
Payer: COMMERCIAL

## 2023-03-09 VITALS
DIASTOLIC BLOOD PRESSURE: 80 MMHG | OXYGEN SATURATION: 98 % | BODY MASS INDEX: 32.69 KG/M2 | HEIGHT: 72 IN | WEIGHT: 241.38 LBS | HEART RATE: 76 BPM | SYSTOLIC BLOOD PRESSURE: 106 MMHG

## 2023-03-09 DIAGNOSIS — E78.2 MIXED HYPERLIPIDEMIA: ICD-10-CM

## 2023-03-09 DIAGNOSIS — J30.9 ALLERGIC RHINITIS, UNSPECIFIED SEASONALITY, UNSPECIFIED TRIGGER: ICD-10-CM

## 2023-03-09 DIAGNOSIS — I10 ESSENTIAL HYPERTENSION: Primary | ICD-10-CM

## 2023-03-09 DIAGNOSIS — R73.01 IMPAIRED FASTING GLUCOSE: ICD-10-CM

## 2023-03-09 DIAGNOSIS — Z13.6 ENCOUNTER FOR SCREENING FOR CARDIOVASCULAR DISORDERS: ICD-10-CM

## 2023-03-09 DIAGNOSIS — F41.9 ANXIETY: ICD-10-CM

## 2023-03-09 DIAGNOSIS — E66.01 MORBID OBESITY: ICD-10-CM

## 2023-03-09 DIAGNOSIS — E04.1 THYROID NODULE: ICD-10-CM

## 2023-03-09 PROCEDURE — 1159F PR MEDICATION LIST DOCUMENTED IN MEDICAL RECORD: ICD-10-PCS | Mod: CPTII,S$GLB,, | Performed by: INTERNAL MEDICINE

## 2023-03-09 PROCEDURE — 99999 PR PBB SHADOW E&M-EST. PATIENT-LVL V: ICD-10-PCS | Mod: PBBFAC,,, | Performed by: INTERNAL MEDICINE

## 2023-03-09 PROCEDURE — 3008F PR BODY MASS INDEX (BMI) DOCUMENTED: ICD-10-PCS | Mod: CPTII,S$GLB,, | Performed by: INTERNAL MEDICINE

## 2023-03-09 PROCEDURE — 3074F PR MOST RECENT SYSTOLIC BLOOD PRESSURE < 130 MM HG: ICD-10-PCS | Mod: CPTII,S$GLB,, | Performed by: INTERNAL MEDICINE

## 2023-03-09 PROCEDURE — 99214 OFFICE O/P EST MOD 30 MIN: CPT | Mod: S$GLB,,, | Performed by: INTERNAL MEDICINE

## 2023-03-09 PROCEDURE — 99214 PR OFFICE/OUTPT VISIT, EST, LEVL IV, 30-39 MIN: ICD-10-PCS | Mod: S$GLB,,, | Performed by: INTERNAL MEDICINE

## 2023-03-09 PROCEDURE — 3079F DIAST BP 80-89 MM HG: CPT | Mod: CPTII,S$GLB,, | Performed by: INTERNAL MEDICINE

## 2023-03-09 PROCEDURE — 3008F BODY MASS INDEX DOCD: CPT | Mod: CPTII,S$GLB,, | Performed by: INTERNAL MEDICINE

## 2023-03-09 PROCEDURE — 4010F PR ACE/ARB THEARPY RXD/TAKEN: ICD-10-PCS | Mod: CPTII,S$GLB,, | Performed by: INTERNAL MEDICINE

## 2023-03-09 PROCEDURE — 1159F MED LIST DOCD IN RCRD: CPT | Mod: CPTII,S$GLB,, | Performed by: INTERNAL MEDICINE

## 2023-03-09 PROCEDURE — 3074F SYST BP LT 130 MM HG: CPT | Mod: CPTII,S$GLB,, | Performed by: INTERNAL MEDICINE

## 2023-03-09 PROCEDURE — 3079F PR MOST RECENT DIASTOLIC BLOOD PRESSURE 80-89 MM HG: ICD-10-PCS | Mod: CPTII,S$GLB,, | Performed by: INTERNAL MEDICINE

## 2023-03-09 PROCEDURE — 4010F ACE/ARB THERAPY RXD/TAKEN: CPT | Mod: CPTII,S$GLB,, | Performed by: INTERNAL MEDICINE

## 2023-03-09 PROCEDURE — 99999 PR PBB SHADOW E&M-EST. PATIENT-LVL V: CPT | Mod: PBBFAC,,, | Performed by: INTERNAL MEDICINE

## 2023-03-09 RX ORDER — SEMAGLUTIDE 1.34 MG/ML
1 INJECTION, SOLUTION SUBCUTANEOUS
COMMUNITY
Start: 2023-01-19 | End: 2024-01-02 | Stop reason: SDUPTHER

## 2023-03-09 RX ORDER — FLUTICASONE PROPIONATE 50 MCG
2 SPRAY, SUSPENSION (ML) NASAL DAILY
Qty: 16 G | Refills: 5 | Status: SHIPPED | OUTPATIENT
Start: 2023-03-09

## 2023-03-09 NOTE — PROGRESS NOTES
[facility] Note    Chief Complaint      Chief Complaint   Patient presents with    Follow-up     3 month f/u       History of Present Illness      Nell Sanches is a 52 y.o. female with chronic conditions of HTN, HLD, anxiety, thyroid nodule, vit d deficiency who presents today for: follow up chronic conditions.  Will be seeing Dr. Sepulveda [facility] to evaluate for hysterectomy 2/2 fibroids.    HTN: BP at goal on losartan.  GERD: has discontinued prilosec and famotidine without significant increase in symptoms.  Has occasional reflux.  Obesity: Has been taking semaglutide 1 mg.  Tried a few weeks of 2 mg without significant improvement over 1 mg.    Flu shto UTD.  TdAP 2019.  COVID vaccine UTD.    Mammogram scheduled.    Cscope 2019    Past Medical History:  Past Medical History:   Diagnosis Date    Abnormal weight gain 10/02/2012    Achilles tendonitis     left    Arthritis     Digestive disorder     Gastro-esophageal reflux disease without esophagitis     Hypertension     Other and unspecified hyperlipidemia 10/02/2012    Unspecified vitamin D deficiency 10/02/2012       Past Surgical History:   has a past surgical history that includes Breast biopsy (Right, );  section; Cholecystectomy; Colonoscopy; Hysteroscopy with dilation and curettage of uterus; and Hip Arthroplasty (Right, 10/25/2022).    Family History:  family history includes Colon cancer (age of onset: 70) in her maternal grandmother.     Social History:  Social History     Tobacco Use    Smoking status: Never    Smokeless tobacco: Never   Substance Use Topics    Alcohol use: Yes     Comment: rare    Drug use: Never       I personally reviewed all past medical, surgical, social and family history.    Review of Systems   Constitutional:  Negative for chills, fever and malaise/fatigue.   Respiratory:  Negative for shortness of breath.    Cardiovascular:  Negative for chest pain.   Gastrointestinal:  Negative for  constipation, diarrhea, nausea and vomiting.   Skin:  Negative for rash.   Neurological:  Negative for weakness.   All other systems reviewed and are negative.     Medications:  Outpatient Encounter Medications as of 3/9/2023   Medication Sig Dispense Refill    calcium carb/vitamin D3/vit K1 (SOFT CHEWS CALCIUM ORAL) Take by mouth.      fluticasone propionate (FLONASE) 50 mcg/actuation nasal spray 2 sprays (100 mcg total) by Each Nostril route once daily. 16 g 5    losartan (COZAAR) 50 MG tablet Take 1 tablet (50 mg total) by mouth once daily. 90 tablet 3    meloxicam (MOBIC) 15 MG tablet Take 15 mg by mouth once daily.      multivitamin capsule 1 cap(s)      OZEMPIC 1 mg/dose (4 mg/3 mL) Inject 1 mg into the skin every 7 days.      triamterene-hydrochlorothiazide 37.5-25 mg (MAXZIDE-25) 37.5-25 mg per tablet Take 1 tablet by mouth once daily. 30 tablet 11    UBIQUINOL, BULK, MISC by Misc.(Non-Drug; Combo Route) route.      [DISCONTINUED] famotidine (PEPCID) 20 MG tablet Take 20 mg by mouth 2 (two) times daily.      [DISCONTINUED] HYDROcodone-acetaminophen (NORCO)  mg per tablet Take 1 tablet by mouth every 6 (six) hours as needed for Pain. (Patient not taking: Reported on 11/30/2022) 50 tablet 0    [DISCONTINUED] omeprazole (PRILOSEC) 40 MG capsule Take 40 mg by mouth once daily.      [DISCONTINUED] pregabalin (LYRICA) 75 MG capsule Take 1 capsule (75 mg total) by mouth 2 (two) times daily. (Patient not taking: Reported on 11/30/2022) 60 capsule 6    [DISCONTINUED] semaglutide (OZEMPIC) 2 mg/dose (8 mg/3 mL) PnIj Inject 2 mg into the skin every 7 days. 3 pen 2     Facility-Administered Encounter Medications as of 3/9/2023   Medication Dose Route Frequency Provider Last Rate Last Admin    0.9%  NaCl infusion   Intravenous Continuous Gil Paulino MD           Allergies:  Review of patient's allergies indicates:   Allergen Reactions    Lisinopril      Other reaction(s): cough    Sulfa (sulfonamide  antibiotics) Hives       Health Maintenance:  Immunization History   Administered Date(s) Administered    COVID-19, MRNA, LN-S, PF (MODERNA FULL 0.5 ML DOSE) 01/22/2021, 02/20/2021, 12/17/2021    Influenza 10/04/2012    Influenza - Quadrivalent - MDCK - PF 10/10/2018    Influenza - Quadrivalent - PF *Preferred* (6 months and older) 12/06/2017, 10/04/2019, 10/08/2020, 11/30/2022    Influenza - Trivalent - PF (ADULT) 10/04/2012    Influenza Split 12/06/2017    Tdap 10/04/2019      Health Maintenance   Topic Date Due    Hepatitis C Screening  Never done    Mammogram  12/10/2022    Lipid Panel  05/13/2023    TETANUS VACCINE  10/04/2029        Physical Exam      Vital Signs  Pulse: 76  SpO2: 98 %  BP: 106/80  BP Location: Right arm  Patient Position: Sitting  Pain Score: 0-No pain  Height and Weight  Height: 6' (182.9 cm)  Weight: 109.5 kg (241 lb 6.5 oz)  BSA (Calculated - sq m): 2.36 sq meters  BMI (Calculated): 32.7  Weight in (lb) to have BMI = 25: 183.9]    Physical Exam  Vitals reviewed.   Constitutional:       Appearance: She is well-developed.   HENT:      Head: Normocephalic and atraumatic.      Right Ear: External ear normal.      Left Ear: External ear normal.   Cardiovascular:      Rate and Rhythm: Normal rate and regular rhythm.      Heart sounds: Normal heart sounds. No murmur heard.  Pulmonary:      Effort: Pulmonary effort is normal.      Breath sounds: Normal breath sounds. No wheezing or rales.   Abdominal:      General: Bowel sounds are normal. There is no distension.      Palpations: Abdomen is soft.      Tenderness: There is no abdominal tenderness.        Laboratory:  CBC:  Recent Labs   Lab 12/31/20  0712 05/13/22  0731 10/13/22  1402   WBC 6.2 6.9 8.33   RBC 4.99 5.30 5.18   Hemoglobin 14.3 15.5 15.4   Hematocrit 43.9 46.3 44.7   Platelets 340 347 359   MCV 88.0 87.3 86   MCH 28.7 29.4 29.7   MCHC 32.6 33.6 34.5     CMP:  Recent Labs   Lab 12/31/20  0712 05/13/22  0731 10/13/22  1402   Glucose 94  102 H 92   Calcium 9.1 9.3 9.2   Albumin 4.2  --   --    ALBUMIN  --  5.0  --    Total Protein 6.5 7.5  --    Sodium 138 138 137   Potassium 4.7 4.2 4.0   CO2 29 23 28   Chloride 105 102 102   BUN 14 15.0 14   Alkaline Phosphatase  --  66  --    ALT 12 15  --    AST 12 14  --    Total Bilirubin 0.7 0.7  --      URINALYSIS:  Recent Labs   Lab 10/13/22  1315   Color, UA Yellow   Specific Gravity, UA 1.025   pH, UA 5.0   Protein, UA Negative   Bacteria Rare   Nitrite, UA Negative   Leukocytes, UA Negative   Urobilinogen, UA Negative      LIPIDS:  Recent Labs   Lab 12/31/20  0712 05/13/22  0731   TSH 2.66 2.59   HDL 51 56   Cholesterol 184 184   Triglycerides 63 88   LDL Calculated  --  114   LDL Cholesterol 118 H  --    HDL/Cholesterol Ratio 3.6  --    Non HDL Chol. (LDL+VLDL) 133 H  --      TSH:  Recent Labs   Lab 12/31/20  0712 05/13/22  0731   TSH 2.66 2.59     A1C:  Recent Labs   Lab 12/31/20  0712 05/13/22  0733   Hemoglobin A1C 5.1 5.0       Assessment/Plan     Nell Sanches is a 52 y.o.female with:    1. Essential hypertension  Continue current meds.    2. Mixed hyperlipidemia  - Comprehensive Metabolic Panel; Future  - Lipid Panel; Future  - Comprehensive Metabolic Panel  - Lipid Panel  Continue diet.  Update labs  3. Thyroid nodule  - US Thyroid; Future  - US Thyroid  - CBC Auto Differential; Future  - TSH; Future  - T4, Free; Future  - CBC Auto Differential  - TSH  - T4, Free  Update labs and ultrasound  4. Anxiety  Continue current meds.    5. Impaired fasting glucose  - Hemoglobin A1C; Future  - Hemoglobin A1C  COunseled on diet  6. Morbid obesity  Continue GLP1  7. Allergic rhinitis, unspecified seasonality, unspecified trigger  - fluticasone propionate (FLONASE) 50 mcg/actuation nasal spray; 2 sprays (100 mcg total) by Each Nostril route once daily.  Dispense: 16 g; Refill: 5    8. Encounter for screening for cardiovascular disorders  - CT Calcium Scoring Cardiac; Future  - CT Calcium Scoring  Cardiac       Chronic conditions status updated as per HPI.  Other than changes above, cont current medications and maintain follow up with specialists.  Follow up in about 3 months (around 6/9/2023) for Follow up visit.    Future Appointments   Date Time Provider Department Center   3/17/2023 11:45 AM [facility] [id number] BX [facility] MAMMO [facility]   6/9/2023  8:15 AM Juanito Cabral MD [facility] [place]   7/13/2023  2:30 PM Jamey Sepulveda IV, MD [facility] [id number] [facility]       Juanito Cabral MD  [facility]

## 2023-03-17 ENCOUNTER — HOSPITAL ENCOUNTER (OUTPATIENT)
Dept: RADIOLOGY | Facility: OTHER | Age: 53
Discharge: HOME | End: 2023-03-17
Attending: INTERNAL MEDICINE
Payer: COMMERCIAL

## 2023-03-17 DIAGNOSIS — Z12.31 OTHER SCREENING MAMMOGRAM: ICD-10-CM

## 2023-03-17 PROCEDURE — 77063 BREAST TOMOSYNTHESIS BI: CPT | Mod: 26,,, | Performed by: RADIOLOGY

## 2023-03-17 PROCEDURE — 77063 MAMMO DIGITAL SCREENING BILAT WITH TOMO: ICD-10-PCS | Mod: 26,,, | Performed by: RADIOLOGY

## 2023-03-17 PROCEDURE — 77067 MAMMO DIGITAL SCREENING BILAT WITH TOMO: ICD-10-PCS | Mod: 26,,, | Performed by: RADIOLOGY

## 2023-03-17 PROCEDURE — 77067 SCR MAMMO BI INCL CAD: CPT | Mod: 26,,, | Performed by: RADIOLOGY

## 2023-03-17 PROCEDURE — 77067 SCR MAMMO BI INCL CAD: CPT | Mod: TC

## 2023-03-24 LAB
ALBUMIN: 4.7 G/DL (ref 3.5–5)
ALP SERPL-CCNC: 65 U/L (ref 38–126)
ALT SERPL W P-5'-P-CCNC: 16 U/L (ref 7–56)
ANION GAP SERPL CALC-SCNC: 16.3 MMOL/L (ref 9–18)
AST SERPL-CCNC: 21 U/L (ref 7–40)
BASOPHILS ABSOLUTE COUNT: 0.1 THOUSAND/UL (ref 0–0.2)
BASOPHILS NFR BLD: 1 % (ref 0–2)
BILIRUB SERPL-MCNC: 0.8 MG/DL (ref 0–1.2)
BUN BLD-MCNC: 15 MG/DL (ref 7–21)
BUN/CREAT SERPL: 15 (ref 6–22)
CALC OSMOLALITY: 278 MOSM/KG (ref 275–295)
CALCIUM SERPL-MCNC: 9.6 MG/DL (ref 8.5–10.4)
CHLORIDE SERPL-SCNC: 101 MMOL/L (ref 98–107)
CHOL/HDLC RATIO: 3.41
CHOLEST SERPL-MSCNC: 184 MG/DL (ref 100–200)
CO2 SERPL-SCNC: 26 MMOL/L (ref 21–31)
CREAT SERPL-MCNC: 0.97 MG/DL (ref 0.5–1)
EGFR: 70 ML/MIN
EOSINOPHIL NFR BLD: 4.4 % (ref 0–4)
EOSINOPHILS ABSOLUTE COUNT: 0.3 THOUSAND/UL (ref 0–0.7)
ERYTHROCYTE [DISTWIDTH] IN BLOOD BY AUTOMATED COUNT: 13.7 % (ref 12–15.3)
FREE T4: 1.23 NG/DL (ref 0.9–1.7)
GLUCOSE SERPL-MCNC: 94 MG/DL (ref 70–100)
HBA1C MFR BLD: 5.5 % (ref 4.5–5.7)
HCT VFR BLD AUTO: 42.2 % (ref 37–47)
HDLC SERPL-MCNC: 54 MG/DL (ref 40–75)
HGB BLD-MCNC: 14 GM/DL (ref 12–16)
LDLC SERPL CALC-MCNC: 115 MG/DL (ref 0–125)
LYMPHOCYTES %: 29.2 % (ref 15–45)
LYMPHOCYTES ABSOLUTE COUNT: 1.9 THOUSAND/UL (ref 1–4.2)
MCH RBC QN AUTO: 27.9 PG (ref 27–33)
MCHC RBC AUTO-ENTMCNC: 33.1 G/DL (ref 32–36)
MCV RBC AUTO: 84.2 FL (ref 81–99)
MONOCYTES %: 7.8 % (ref 3–13)
MONOCYTES ABSOLUTE COUNT: 0.5 THOUSAND/UL (ref 0.1–0.8)
NEUTROPHILS ABSOLUTE COUNT: 3.7 THOUSAND/UL (ref 2.1–7.6)
NEUTROPHILS RELATIVE PERCENT: 57.6 % (ref 32–80)
PLATELET # BLD AUTO: 297 THOUSAND/UL (ref 150–350)
PMV BLD AUTO: 8.8 FL (ref 7–10.2)
POTASSIUM SERPL-SCNC: 4.3 MMOL/L (ref 3.5–5)
RBC # BLD AUTO: 5.01 MILLION/UL (ref 4.2–5.4)
SODIUM BLD-SCNC: 139 MMOL/L (ref 135–145)
TOTAL PROTEIN: 7 G/DL (ref 6.3–8.2)
TRIGL SERPL-MCNC: 91 MG/DL (ref 30–150)
TSH SERPL DL<=0.005 MIU/L-ACNC: 2.35 UIU/ML (ref 0.35–4)
WBC # BLD AUTO: 6.5 THOUSAND/UL (ref 4.5–11)

## 2023-03-27 ENCOUNTER — TELEPHONE (OUTPATIENT)
Dept: PRIMARY CARE CLINIC | Facility: CLINIC | Age: 53
End: 2023-03-27
Payer: COMMERCIAL

## 2023-03-27 NOTE — TELEPHONE ENCOUNTER
----- Message from Danae Judge sent at 3/27/2023  9:24 AM CDT -----  Regarding: return call  Contact: pt  Type:  Patient Returning Call    Who Called:pt  Who Left Message for Patient:PB NESS  Does the patient know what this is regarding?:yes  Would the patient rather a call back or a response via [facility]?   Best Call Back Number:[phone]  Additional Information: n/a

## 2023-03-27 NOTE — TELEPHONE ENCOUNTER
----- Message from Juanito Cabral MD sent at 3/26/2023  8:46 PM CDT -----  Mammogram does not show any worrisome findings.  Recommend repeat mammogram in 1 year.

## 2023-06-02 DIAGNOSIS — I87.2 VENOUS INSUFFICIENCY: ICD-10-CM

## 2023-06-02 RX ORDER — TRIAMTERENE/HYDROCHLOROTHIAZID 37.5-25 MG
TABLET ORAL
Qty: 30 TABLET | Refills: 11 | OUTPATIENT
Start: 2023-06-02

## 2023-06-02 RX ORDER — TRIAMTERENE/HYDROCHLOROTHIAZID 37.5-25 MG
TABLET ORAL
Qty: 90 TABLET | Refills: 3 | Status: SHIPPED | OUTPATIENT
Start: 2023-06-02 | End: 2024-03-22

## 2023-06-02 NOTE — TELEPHONE ENCOUNTER
Refill Decision Note   Nell Sanches  is requesting a refill authorization.  Brief Assessment and Rationale for Refill:  Approve     Medication Therapy Plan:         Comments:     Note composed:12:02 PM 06/02/2023             Appointments     Last Visit   3/9/2023 Juanito Cabral MD   Next Visit   6/2/2023 Juanito Cabral MD

## 2023-06-02 NOTE — TELEPHONE ENCOUNTER
No care due was identified.  Health [facility] Embedded Care Due Messages. Reference number: [id number].   6/02/2023 7:30:08 AM CDT

## 2023-06-02 NOTE — TELEPHONE ENCOUNTER
Refill Decision Note   Nell Sanches  is requesting a refill authorization.  Brief Assessment and Rationale for Refill:  Quick Discontinue     Medication Therapy Plan:  E-Prescribing Status: Receipt confirmed by pharmacy (6/2/2023 12:02 PM CDT)    Medication Reconciliation Completed: No   Comments:     No Care Gaps recommended.     Note composed:12:34 PM 06/02/2023

## 2023-06-02 NOTE — TELEPHONE ENCOUNTER
No care due was identified.  Health [facility] Embedded Care Due Messages. Reference number: [id number].   6/02/2023 7:04:31 AM CDT

## 2023-06-09 ENCOUNTER — OFFICE VISIT (OUTPATIENT)
Dept: PRIMARY CARE CLINIC | Facility: CLINIC | Age: 53
End: 2023-06-09
Payer: COMMERCIAL

## 2023-06-09 VITALS
HEART RATE: 87 BPM | OXYGEN SATURATION: 98 % | BODY MASS INDEX: 33.71 KG/M2 | DIASTOLIC BLOOD PRESSURE: 86 MMHG | HEIGHT: 72 IN | SYSTOLIC BLOOD PRESSURE: 110 MMHG | WEIGHT: 248.88 LBS

## 2023-06-09 DIAGNOSIS — F41.9 ANXIETY: ICD-10-CM

## 2023-06-09 DIAGNOSIS — E04.1 THYROID NODULE: ICD-10-CM

## 2023-06-09 DIAGNOSIS — I10 ESSENTIAL HYPERTENSION: ICD-10-CM

## 2023-06-09 DIAGNOSIS — E66.9 OBESITY, UNSPECIFIED CLASSIFICATION, UNSPECIFIED OBESITY TYPE, UNSPECIFIED WHETHER SERIOUS COMORBIDITY PRESENT: Primary | ICD-10-CM

## 2023-06-09 PROCEDURE — 4010F ACE/ARB THERAPY RXD/TAKEN: CPT | Mod: CPTII,S$GLB,, | Performed by: INTERNAL MEDICINE

## 2023-06-09 PROCEDURE — 3008F PR BODY MASS INDEX (BMI) DOCUMENTED: ICD-10-PCS | Mod: CPTII,S$GLB,, | Performed by: INTERNAL MEDICINE

## 2023-06-09 PROCEDURE — 3079F PR MOST RECENT DIASTOLIC BLOOD PRESSURE 80-89 MM HG: ICD-10-PCS | Mod: CPTII,S$GLB,, | Performed by: INTERNAL MEDICINE

## 2023-06-09 PROCEDURE — 99214 OFFICE O/P EST MOD 30 MIN: CPT | Mod: S$GLB,,, | Performed by: INTERNAL MEDICINE

## 2023-06-09 PROCEDURE — 3044F HG A1C LEVEL LT 7.0%: CPT | Mod: CPTII,S$GLB,, | Performed by: INTERNAL MEDICINE

## 2023-06-09 PROCEDURE — 99214 PR OFFICE/OUTPT VISIT, EST, LEVL IV, 30-39 MIN: ICD-10-PCS | Mod: S$GLB,,, | Performed by: INTERNAL MEDICINE

## 2023-06-09 PROCEDURE — 3044F PR MOST RECENT HEMOGLOBIN A1C LEVEL <7.0%: ICD-10-PCS | Mod: CPTII,S$GLB,, | Performed by: INTERNAL MEDICINE

## 2023-06-09 PROCEDURE — 3079F DIAST BP 80-89 MM HG: CPT | Mod: CPTII,S$GLB,, | Performed by: INTERNAL MEDICINE

## 2023-06-09 PROCEDURE — 4010F PR ACE/ARB THEARPY RXD/TAKEN: ICD-10-PCS | Mod: CPTII,S$GLB,, | Performed by: INTERNAL MEDICINE

## 2023-06-09 PROCEDURE — 99999 PR PBB SHADOW E&M-EST. PATIENT-LVL IV: CPT | Mod: PBBFAC,,, | Performed by: INTERNAL MEDICINE

## 2023-06-09 PROCEDURE — 3008F BODY MASS INDEX DOCD: CPT | Mod: CPTII,S$GLB,, | Performed by: INTERNAL MEDICINE

## 2023-06-09 PROCEDURE — 99999 PR PBB SHADOW E&M-EST. PATIENT-LVL IV: ICD-10-PCS | Mod: PBBFAC,,, | Performed by: INTERNAL MEDICINE

## 2023-06-09 PROCEDURE — 3074F PR MOST RECENT SYSTOLIC BLOOD PRESSURE < 130 MM HG: ICD-10-PCS | Mod: CPTII,S$GLB,, | Performed by: INTERNAL MEDICINE

## 2023-06-09 PROCEDURE — 3074F SYST BP LT 130 MM HG: CPT | Mod: CPTII,S$GLB,, | Performed by: INTERNAL MEDICINE

## 2023-06-09 PROCEDURE — 1159F PR MEDICATION LIST DOCUMENTED IN MEDICAL RECORD: ICD-10-PCS | Mod: CPTII,S$GLB,, | Performed by: INTERNAL MEDICINE

## 2023-06-09 PROCEDURE — 1159F MED LIST DOCD IN RCRD: CPT | Mod: CPTII,S$GLB,, | Performed by: INTERNAL MEDICINE

## 2023-06-09 NOTE — PROGRESS NOTES
[facility] Note    Chief Complaint      Chief Complaint   Patient presents with    Follow-up     3 month        History of Present Illness      Nell Sanches is a 52 y.o. female with chronic conditions of HTN, HLD, anxiety, thyroid nodule, vit d deficiency who presents today for: follow up chronic conditions.  Has been taking semaglutide for weight loss and was able to get down to 235 lbs.  Now at 248 lbs after hip surgery and son at home recently.  Asking to see nutritionist.  HTN: BP at goal on losartan.  GERD: has discontinued prilosec and famotidine without significant increase in symptoms.  Has occasional reflux.  Obesity: Has been taking semaglutide 1 mg.  Tried a few weeks of 2 mg without significant improvement over 1 mg.    Flu shto UTD.  TdAP .  COVID vaccine UTD.    Mammogram .    Cscope 2019, Dr. Salgado, no polyp, 10 yr interval.     Past Medical History:  Past Medical History:   Diagnosis Date    Abnormal weight gain 10/02/2012    Achilles tendonitis     left    Arthritis     Digestive disorder     Gastro-esophageal reflux disease without esophagitis     Hypertension     Other and unspecified hyperlipidemia 10/02/2012    Unspecified vitamin D deficiency 10/02/2012       Past Surgical History:   has a past surgical history that includes Breast biopsy (Right, );  section; Cholecystectomy; Colonoscopy; Hysteroscopy with dilation and curettage of uterus; and Hip Arthroplasty (Right, 10/25/2022).    Family History:  family history includes Colon cancer (age of onset: 70) in her maternal grandmother.     Social History:  Social History     Tobacco Use    Smoking status: Never    Smokeless tobacco: Never   Substance Use Topics    Alcohol use: Yes     Comment: rare    Drug use: Never       I personally reviewed all past medical, surgical, social and family history.    Review of Systems   Constitutional:  Negative for chills, fever and malaise/fatigue.    Respiratory:  Negative for shortness of breath.    Cardiovascular:  Negative for chest pain.   Gastrointestinal:  Negative for constipation, diarrhea, nausea and vomiting.   Skin:  Negative for rash.   Neurological:  Negative for weakness.   All other systems reviewed and are negative.     Medications:  Outpatient Encounter Medications as of 6/9/2023   Medication Sig Dispense Refill    calcium carb/vitamin D3/vit K1 (SOFT CHEWS CALCIUM ORAL) Take by mouth.      fluticasone propionate (FLONASE) 50 mcg/actuation nasal spray 2 sprays (100 mcg total) by Each Nostril route once daily. 16 g 5    losartan (COZAAR) 50 MG tablet Take 1 tablet (50 mg total) by mouth once daily. 90 tablet 3    meloxicam (MOBIC) 15 MG tablet Take 15 mg by mouth once daily.      multivitamin capsule 1 cap(s)      OZEMPIC 1 mg/dose (4 mg/3 mL) Inject 1 mg into the skin every 7 days.      triamterene-hydrochlorothiazide 37.5-25 mg (MAXZIDE-25) 37.5-25 mg per tablet TAKE 1 TABLET BY MOUTH EVERY DAY 90 tablet 3    UBIQUINOL, BULK, MISC by Misc.(Non-Drug; Combo Route) route.      [DISCONTINUED] triamterene-hydrochlorothiazide 37.5-25 mg (MAXZIDE-25) 37.5-25 mg per tablet Take 1 tablet by mouth once daily. 30 tablet 11     Facility-Administered Encounter Medications as of 6/9/2023   Medication Dose Route Frequency Provider Last Rate Last Admin    0.9%  NaCl infusion   Intravenous Continuous Gil Paulino MD           Allergies:  Review of patient's allergies indicates:   Allergen Reactions    Lisinopril      Other reaction(s): cough    Sulfa (sulfonamide antibiotics) Hives       Health Maintenance:  Immunization History   Administered Date(s) Administered    COVID-19, MRNA, LN-S, PF (MODERNA FULL 0.5 ML DOSE) 01/22/2021, 02/20/2021, 12/17/2021    Influenza 10/04/2012    Influenza - Quadrivalent - MDCK - PF 10/10/2018    Influenza - Quadrivalent - PF *Preferred* (6 months and older) 12/06/2017, 10/04/2019, 10/08/2020, 11/30/2022    Influenza -  Trivalent - PF (ADULT) 10/04/2012    Influenza Split 12/06/2017    Tdap 10/04/2019      Health Maintenance   Topic Date Due    Hepatitis C Screening  Never done    Mammogram  03/17/2024    Lipid Panel  03/24/2024    TETANUS VACCINE  10/04/2029        Physical Exam      Vital Signs  Pulse: 87  SpO2: 98 %  BP: 110/86  BP Location: Left arm  Patient Position: Sitting  Pain Score: 0-No pain  Height and Weight  Height: 6' (182.9 cm)  Weight: 112.9 kg (248 lb 14.4 oz)  BSA (Calculated - sq m): 2.39 sq meters  BMI (Calculated): 33.7  Weight in (lb) to have BMI = 25: 183.9]    Physical Exam  Vitals reviewed.   Constitutional:       Appearance: She is well-developed.   HENT:      Head: Normocephalic and atraumatic.      Right Ear: External ear normal.      Left Ear: External ear normal.   Cardiovascular:      Rate and Rhythm: Normal rate and regular rhythm.      Heart sounds: Normal heart sounds. No murmur heard.  Pulmonary:      Effort: Pulmonary effort is normal.      Breath sounds: Normal breath sounds. No wheezing or rales.   Abdominal:      General: Bowel sounds are normal. There is no distension.      Palpations: Abdomen is soft.      Tenderness: There is no abdominal tenderness.        Laboratory:  CBC:  Recent Labs   Lab 05/13/22  0731 10/13/22  1402 03/24/23  0832   WBC 6.9 8.33 6.5   RBC 5.30 5.18 5.01   Hemoglobin 15.5 15.4 14.0   Hematocrit 46.3 44.7 42.2   Platelets 347 359 297   MCV 87.3 86 84.2   MCH 29.4 29.7 27.9   MCHC 33.6 34.5 33.1     CMP:  Recent Labs   Lab 12/31/20  0712 12/31/20  0712 05/13/22  0731 10/13/22  1402 03/24/23  0832   Glucose 94  --  102 H   < > 94   Calcium 9.1  --  9.3   < > 9.6   Albumin 4.2  --   --   --   --    ALBUMIN  --    < > 5.0  --  4.7   Total Protein 6.5   < > 7.5  --  7.0   Sodium 138  --  138   < > 139   Potassium 4.7  --  4.2   < > 4.3   CO2 29  --  23   < > 26   Chloride 105  --  102   < > 101   BUN 14   < > 15.0   < > 15.0   Alkaline Phosphatase  --    < > 66  --  65    ALT 12  --  15  --  16   AST 12  --  14  --  21   Total Bilirubin 0.7  --  0.7  --  0.8    < > = values in this interval not displayed.     URINALYSIS:  Recent Labs   Lab 10/13/22  1315   Color, UA Yellow   Specific Gravity, UA 1.025   pH, UA 5.0   Protein, UA Negative   Bacteria Rare   Nitrite, UA Negative   Leukocytes, UA Negative   Urobilinogen, UA Negative      LIPIDS:  Recent Labs   Lab 12/31/20  0712 05/13/22  0731 03/24/23  0832   TSH 2.66 2.59 2.35   HDL 51 56 54   Cholesterol 184 184 184   Triglycerides 63 88 91   LDL Calculated  --  114 115   LDL Cholesterol 118 H  --   --    HDL/Cholesterol Ratio 3.6  --   --    Non HDL Chol. (LDL+VLDL) 133 H  --   --      TSH:  Recent Labs   Lab 12/31/20  0712 05/13/22  0731 03/24/23  0832   TSH 2.66 2.59 2.35     A1C:  Recent Labs   Lab 12/31/20  0712 05/13/22  0733 03/24/23  0832   Hemoglobin A1C 5.1 5.0 5.5       Assessment/Plan     Nell Sanches is a 52 y.o.female with:    1. Obesity, unspecified classification, unspecified obesity type, unspecified whether serious comorbidity present  - Ambulatory referral/consult to Nutrition Services; Future  Referring to dietician for weight loss counseling.  2. Essential hypertension  Continue current meds.    3. Anxiety  Continue current meds.    4. Thyroid nodule  Stable.    Chronic conditions status updated as per HPI.  Other than changes above, cont current medications and maintain follow up with specialists.  No follow-ups on file.    Future Appointments   Date Time Provider Department Center   7/13/2023  2:30 PM Jamey Sepulveda IV, MD [facility] [id number] Serg Cabral MD  [facility]

## 2023-06-10 DIAGNOSIS — I10 ESSENTIAL HYPERTENSION: ICD-10-CM

## 2023-06-10 RX ORDER — LOSARTAN POTASSIUM 50 MG/1
TABLET ORAL
Qty: 90 TABLET | Refills: 3 | Status: SHIPPED | OUTPATIENT
Start: 2023-06-10 | End: 2024-03-22 | Stop reason: SDUPTHER

## 2023-06-10 NOTE — TELEPHONE ENCOUNTER
No care due was identified.  [facility] Embedded Care Due Messages. Reference number: [id number].   6/10/2023 8:08:17 AM CDT

## 2023-06-10 NOTE — TELEPHONE ENCOUNTER
Refill Decision Note   Nell Ruth Annlatiajorge l  is requesting a refill authorization.  Brief Assessment and Rationale for Refill:  Approve     Medication Therapy Plan:       Medication Reconciliation Completed: No   Comments:     No Care Gaps recommended.     Note composed:10:24 AM 06/10/2023

## 2023-07-13 ENCOUNTER — OFFICE VISIT (OUTPATIENT)
Dept: OBSTETRICS AND GYNECOLOGY | Facility: CLINIC | Age: 53
End: 2023-07-13
Payer: COMMERCIAL

## 2023-07-13 VITALS
SYSTOLIC BLOOD PRESSURE: 108 MMHG | DIASTOLIC BLOOD PRESSURE: 78 MMHG | WEIGHT: 244.06 LBS | BODY MASS INDEX: 33.06 KG/M2 | HEIGHT: 72 IN

## 2023-07-13 DIAGNOSIS — D25.1 FIBROIDS, INTRAMURAL: Primary | ICD-10-CM

## 2023-07-13 DIAGNOSIS — N85.2 BULKY OR ENLARGED UTERUS: ICD-10-CM

## 2023-07-13 PROCEDURE — 1159F PR MEDICATION LIST DOCUMENTED IN MEDICAL RECORD: ICD-10-PCS | Mod: CPTII,S$GLB,, | Performed by: OBSTETRICS & GYNECOLOGY

## 2023-07-13 PROCEDURE — 3008F PR BODY MASS INDEX (BMI) DOCUMENTED: ICD-10-PCS | Mod: CPTII,S$GLB,, | Performed by: OBSTETRICS & GYNECOLOGY

## 2023-07-13 PROCEDURE — 3074F PR MOST RECENT SYSTOLIC BLOOD PRESSURE < 130 MM HG: ICD-10-PCS | Mod: CPTII,S$GLB,, | Performed by: OBSTETRICS & GYNECOLOGY

## 2023-07-13 PROCEDURE — 3078F DIAST BP <80 MM HG: CPT | Mod: CPTII,S$GLB,, | Performed by: OBSTETRICS & GYNECOLOGY

## 2023-07-13 PROCEDURE — 4010F ACE/ARB THERAPY RXD/TAKEN: CPT | Mod: CPTII,S$GLB,, | Performed by: OBSTETRICS & GYNECOLOGY

## 2023-07-13 PROCEDURE — 3074F SYST BP LT 130 MM HG: CPT | Mod: CPTII,S$GLB,, | Performed by: OBSTETRICS & GYNECOLOGY

## 2023-07-13 PROCEDURE — 3008F BODY MASS INDEX DOCD: CPT | Mod: CPTII,S$GLB,, | Performed by: OBSTETRICS & GYNECOLOGY

## 2023-07-13 PROCEDURE — 99999 PR PBB SHADOW E&M-EST. PATIENT-LVL III: ICD-10-PCS | Mod: PBBFAC,,, | Performed by: OBSTETRICS & GYNECOLOGY

## 2023-07-13 PROCEDURE — 3044F HG A1C LEVEL LT 7.0%: CPT | Mod: CPTII,S$GLB,, | Performed by: OBSTETRICS & GYNECOLOGY

## 2023-07-13 PROCEDURE — 3078F PR MOST RECENT DIASTOLIC BLOOD PRESSURE < 80 MM HG: ICD-10-PCS | Mod: CPTII,S$GLB,, | Performed by: OBSTETRICS & GYNECOLOGY

## 2023-07-13 PROCEDURE — 99203 OFFICE O/P NEW LOW 30 MIN: CPT | Mod: S$GLB,,, | Performed by: OBSTETRICS & GYNECOLOGY

## 2023-07-13 PROCEDURE — 99999 PR PBB SHADOW E&M-EST. PATIENT-LVL III: CPT | Mod: PBBFAC,,, | Performed by: OBSTETRICS & GYNECOLOGY

## 2023-07-13 PROCEDURE — 3044F PR MOST RECENT HEMOGLOBIN A1C LEVEL <7.0%: ICD-10-PCS | Mod: CPTII,S$GLB,, | Performed by: OBSTETRICS & GYNECOLOGY

## 2023-07-13 PROCEDURE — 99203 PR OFFICE/OUTPT VISIT, NEW, LEVL III, 30-44 MIN: ICD-10-PCS | Mod: S$GLB,,, | Performed by: OBSTETRICS & GYNECOLOGY

## 2023-07-13 PROCEDURE — 4010F PR ACE/ARB THEARPY RXD/TAKEN: ICD-10-PCS | Mod: CPTII,S$GLB,, | Performed by: OBSTETRICS & GYNECOLOGY

## 2023-07-13 PROCEDURE — 1159F MED LIST DOCD IN RCRD: CPT | Mod: CPTII,S$GLB,, | Performed by: OBSTETRICS & GYNECOLOGY

## 2023-07-13 RX ORDER — LEVONORGESTREL 52 MG/1
INTRAUTERINE DEVICE INTRAUTERINE
COMMUNITY
Start: 2022-09-09 | End: 2023-12-11

## 2023-07-13 RX ORDER — NORGESTREL AND ETHINYL ESTRADIOL 0.3-0.03MG
KIT ORAL
COMMUNITY
Start: 2023-05-05 | End: 2023-10-24 | Stop reason: CLARIF

## 2023-07-13 NOTE — PROGRESS NOTES
CC:  Enlarged uterus with fibroids    HPI:  Nell Sanches  is a 52 y.o.   patient who presents today for evaluation discussion of minimally invasive hysterectomy treatment options for significantly enlarged uterus with fibroids.  Patient's primary gyn is Dr. Ele Gustafson.  Patient has had a consultation/opinion visit with Dr. Светлана Fabian at [facility].  Patient is here to review and discuss minimally invasive treatment hysterectomy option as definitive surgical management.  Patient has done her research and would like to review her case and have all her questions answered.    Patient had been treated in past with IUD (Mirena).  Patient has Mirena IUD in place with continued bleeding.  Patient has pertinent gynecologic history for heavy uterine bleeding with cramping.  Patient reports continued spotting and bleeding with Mirena in place.  Patient did have Mirena procedure approximately 1 and half years ago that required hysteroscopic removal and replacement of Mirena that ultimately spontaneously fell out.  Mirena was replaced in the office by Dr. Gustafson and patient has Mirena in place at present.    Patient reports that she is had significant back discomfort that has been mostly alleviated with active physical therapy.    Pertinent surgical history:  History of  delivery x1.    No other gynecologic complaints today.    Patient's last menstrual period was 2023 (exact date).    Chart reviewed/[facility] sonogram report reviewed.    Past Medical History:   Diagnosis Date    Abnormal weight gain 10/02/2012    Achilles tendonitis     left    Arthritis     Digestive disorder     Gastro-esophageal reflux disease without esophagitis     Hypertension     Other and unspecified hyperlipidemia 10/02/2012    Unspecified vitamin D deficiency 10/02/2012       Past Surgical History:   Procedure Laterality Date    BREAST BIOPSY Right 2012     SECTION      CHOLECYSTECTOMY      COLONOSCOPY      HIP ARTHROPLASTY  Right 10/25/2022    Procedure: ARTHROPLASTY, HIP TOTAL;  Surgeon: Gil Paulino MD;  Location: [facility];  Service: Orthopedics;  Laterality: Right;    HYSTEROSCOPY WITH DILATION AND CURETTAGE OF UTERUS      and iud retrieval         ROS:  GENERAL: Feeling well overall.   SKIN: Denies rash or lesions.   HEAD: Denies head injury or headache.   NODES: Denies enlarged lymph nodes.   CHEST: Denies chest pain or shortness of breath.   CARDIOVASCULAR: Denies palpitations or left sided chest pain.   ABDOMEN: No abdominal pain, nausea, vomiting or rectal bleeding.   URINARY: No dysuria, hematuria, or burning on urination.  REPRODUCTIVE: See HPI.   BREASTS: Denies pain, lumps, or nipple discharge.   HEMATOLOGIC: No easy bruisability or excessive bleeding.   MUSCULOSKELETAL: Denies joint pain or swelling.   NEUROLOGIC: Denies syncope or weakness.   PSYCHIATRIC: Denies depression.    PE:   Deferred per patient    Diagnosis:  1. Fibroids, intramural    2. Bulky or enlarged uterus        PLAN:    Patient was counseled today on uterine fibroids and minimally invasive hysterectomy.  Risks and benefits reviewed in detail.  Procedure reviewed in detail.  Need for contained tissue extraction reviewed in detail.    All patient questions reviewed and answered.    Patient reports that she will contact office if further follow-up/hysterectomy scheduling with my office is desired.    Follow-up:  PRN    Billing based on face-to-face consultation time/chart review/chart completion:    Jamey Sepulveda IV, MD

## 2023-07-14 ENCOUNTER — TELEPHONE (OUTPATIENT)
Dept: OBSTETRICS AND GYNECOLOGY | Facility: CLINIC | Age: 53
End: 2023-07-14
Payer: COMMERCIAL

## 2023-07-14 NOTE — TELEPHONE ENCOUNTER
----- Message from Clary Lyn sent at 7/14/2023  2:12 PM CDT -----  Pt would like a call back to be put on the surgery schedule. Pt# [phone]

## 2023-07-14 NOTE — TELEPHONE ENCOUNTER
Spoke with pt, informed her tamar is out and will be in touch when she returns next week  ,Patient verbalized understanding

## 2023-07-17 ENCOUNTER — TELEPHONE (OUTPATIENT)
Dept: OBSTETRICS AND GYNECOLOGY | Facility: CLINIC | Age: 53
End: 2023-07-17
Payer: COMMERCIAL

## 2023-07-17 DIAGNOSIS — D21.9 FIBROIDS: ICD-10-CM

## 2023-07-17 DIAGNOSIS — Z97.5 BREAKTHROUGH BLEEDING WITH IUD: ICD-10-CM

## 2023-07-17 DIAGNOSIS — N85.2 BULKY OR ENLARGED UTERUS: Primary | ICD-10-CM

## 2023-07-17 DIAGNOSIS — N92.1 BREAKTHROUGH BLEEDING WITH IUD: ICD-10-CM

## 2023-07-17 NOTE — TELEPHONE ENCOUNTER
Patient scheduled for DLH 10/30, preop scheduled 10/24 @ 1, then will see anesthesia. Pelvic exam at time of preop. Pt will have primary GYN Dr. Gustafson do embx 10/2023.

## 2023-09-08 ENCOUNTER — TELEPHONE (OUTPATIENT)
Dept: OBSTETRICS AND GYNECOLOGY | Facility: CLINIC | Age: 53
End: 2023-09-08
Payer: COMMERCIAL

## 2023-09-08 NOTE — TELEPHONE ENCOUNTER
Patient report having vaginal bleeding, Dr. Gustafson, treated with Norethidrone Reports still having bleeding, able to do daily task. EMBX scheduled with Marifer Jurado 10/9/2023, wanted to let Dr. Sepulveda know. Patient informed will be contacted if sooner surgery date occurs.

## 2023-09-08 NOTE — TELEPHONE ENCOUNTER
----- Message from Janelle Van sent at 9/8/2023  9:28 AM CDT -----  Patient is trying to get her Surgery that is schedule on 10/30 move up to another day. Patient   states that she is having  some health issue at this time. Ms. Camejo would like someone to give   her a call back In regards to this matter.    Contact# [phone]                                           Thank You!!!!

## 2023-09-22 ENCOUNTER — OFFICE VISIT (OUTPATIENT)
Dept: PRIMARY CARE CLINIC | Facility: CLINIC | Age: 53
End: 2023-09-22
Payer: COMMERCIAL

## 2023-09-22 VITALS
SYSTOLIC BLOOD PRESSURE: 112 MMHG | DIASTOLIC BLOOD PRESSURE: 82 MMHG | HEART RATE: 104 BPM | WEIGHT: 247.56 LBS | BODY MASS INDEX: 33.53 KG/M2 | HEIGHT: 72 IN | OXYGEN SATURATION: 99 %

## 2023-09-22 DIAGNOSIS — I10 ESSENTIAL HYPERTENSION: Primary | ICD-10-CM

## 2023-09-22 DIAGNOSIS — E78.2 MIXED HYPERLIPIDEMIA: ICD-10-CM

## 2023-09-22 DIAGNOSIS — R73.01 IMPAIRED FASTING GLUCOSE: ICD-10-CM

## 2023-09-22 DIAGNOSIS — Z00.00 ANNUAL PHYSICAL EXAM: ICD-10-CM

## 2023-09-22 DIAGNOSIS — F41.9 ANXIETY: ICD-10-CM

## 2023-09-22 DIAGNOSIS — E04.1 THYROID NODULE: ICD-10-CM

## 2023-09-22 DIAGNOSIS — K21.9 GERD WITHOUT ESOPHAGITIS: ICD-10-CM

## 2023-09-22 PROCEDURE — 99999 PR PBB SHADOW E&M-EST. PATIENT-LVL IV: CPT | Mod: PBBFAC,,, | Performed by: INTERNAL MEDICINE

## 2023-09-22 PROCEDURE — 3044F PR MOST RECENT HEMOGLOBIN A1C LEVEL <7.0%: ICD-10-PCS | Mod: CPTII,S$GLB,, | Performed by: INTERNAL MEDICINE

## 2023-09-22 PROCEDURE — 3008F PR BODY MASS INDEX (BMI) DOCUMENTED: ICD-10-PCS | Mod: CPTII,S$GLB,, | Performed by: INTERNAL MEDICINE

## 2023-09-22 PROCEDURE — 4010F ACE/ARB THERAPY RXD/TAKEN: CPT | Mod: CPTII,S$GLB,, | Performed by: INTERNAL MEDICINE

## 2023-09-22 PROCEDURE — 99999 PR PBB SHADOW E&M-EST. PATIENT-LVL IV: ICD-10-PCS | Mod: PBBFAC,,, | Performed by: INTERNAL MEDICINE

## 2023-09-22 PROCEDURE — 99214 PR OFFICE/OUTPT VISIT, EST, LEVL IV, 30-39 MIN: ICD-10-PCS | Mod: S$GLB,,, | Performed by: INTERNAL MEDICINE

## 2023-09-22 PROCEDURE — 3074F PR MOST RECENT SYSTOLIC BLOOD PRESSURE < 130 MM HG: ICD-10-PCS | Mod: CPTII,S$GLB,, | Performed by: INTERNAL MEDICINE

## 2023-09-22 PROCEDURE — 3079F DIAST BP 80-89 MM HG: CPT | Mod: CPTII,S$GLB,, | Performed by: INTERNAL MEDICINE

## 2023-09-22 PROCEDURE — 3044F HG A1C LEVEL LT 7.0%: CPT | Mod: CPTII,S$GLB,, | Performed by: INTERNAL MEDICINE

## 2023-09-22 PROCEDURE — 1159F MED LIST DOCD IN RCRD: CPT | Mod: CPTII,S$GLB,, | Performed by: INTERNAL MEDICINE

## 2023-09-22 PROCEDURE — 3079F PR MOST RECENT DIASTOLIC BLOOD PRESSURE 80-89 MM HG: ICD-10-PCS | Mod: CPTII,S$GLB,, | Performed by: INTERNAL MEDICINE

## 2023-09-22 PROCEDURE — 99214 OFFICE O/P EST MOD 30 MIN: CPT | Mod: S$GLB,,, | Performed by: INTERNAL MEDICINE

## 2023-09-22 PROCEDURE — 4010F PR ACE/ARB THEARPY RXD/TAKEN: ICD-10-PCS | Mod: CPTII,S$GLB,, | Performed by: INTERNAL MEDICINE

## 2023-09-22 PROCEDURE — 3074F SYST BP LT 130 MM HG: CPT | Mod: CPTII,S$GLB,, | Performed by: INTERNAL MEDICINE

## 2023-09-22 PROCEDURE — 3008F BODY MASS INDEX DOCD: CPT | Mod: CPTII,S$GLB,, | Performed by: INTERNAL MEDICINE

## 2023-09-22 PROCEDURE — 1159F PR MEDICATION LIST DOCUMENTED IN MEDICAL RECORD: ICD-10-PCS | Mod: CPTII,S$GLB,, | Performed by: INTERNAL MEDICINE

## 2023-09-22 NOTE — PROGRESS NOTES
[facility] Note    Chief Complaint      Chief Complaint   Patient presents with    Follow-up       History of Present Illness      Nell Sanches is a 53 y.o. female with chronic conditions of HTN, HLD, anxiety, thyroid nodule, vit d deficiency who presents today for: follow up chronic conditions.  Has been using semaglutide for weight loss.  Hasn't lost weight since last visit.  Will be having hysterectomy with Dr. Sepulveda due to DUB.    HTN: BP at goal on losartan.  GERD: has discontinued prilosec and famotidine without significant increase in symptoms.  Has occasional reflux.  Obesity: Has been taking semaglutide 1 mg.  Tried a few weeks of 2 mg without significant improvement over 1 mg.    Flu shto discussed.  TdAP .  COVID vaccine UTD.    Mammogram .    Cscope 2019, Dr. Salgado, no polyp, 10 yr interval.     Past Medical History:  Past Medical History:   Diagnosis Date    Abnormal weight gain 10/02/2012    Achilles tendonitis     left    Arthritis     Digestive disorder     Gastro-esophageal reflux disease without esophagitis     Hypertension     Other and unspecified hyperlipidemia 10/02/2012    Unspecified vitamin D deficiency 10/02/2012       Past Surgical History:   has a past surgical history that includes Breast biopsy (Right, );  section; Cholecystectomy; Colonoscopy; Hysteroscopy with dilation and curettage of uterus; and Hip Arthroplasty (Right, 10/25/2022).    Family History:  family history includes Colon cancer (age of onset: 70) in her maternal grandmother.     Social History:  Social History     Tobacco Use    Smoking status: Never    Smokeless tobacco: Never   Substance Use Topics    Alcohol use: Yes     Comment: rare    Drug use: Never       I personally reviewed all past medical, surgical, social and family history.    Review of Systems   Constitutional:  Negative for chills, fever and malaise/fatigue.   Respiratory:  Negative for shortness of  breath.    Cardiovascular:  Negative for chest pain.   Gastrointestinal:  Negative for constipation, diarrhea, nausea and vomiting.   Skin:  Negative for rash.   Neurological:  Negative for weakness.   All other systems reviewed and are negative.       Medications:  Outpatient Encounter Medications as of 9/22/2023   Medication Sig Dispense Refill    calcium carb/vitamin D3/vit K1 (SOFT CHEWS CALCIUM ORAL) Take by mouth.      CRYSELLE, 28, 0.3-30 mg-mcg per tablet Take by mouth.      fluticasone propionate (FLONASE) 50 mcg/actuation nasal spray 2 sprays (100 mcg total) by Each Nostril route once daily. 16 g 5    levonorgestreL (MIRENA) 21 mcg/24 hours (8 yrs) 52 mg IUD 1 ea by intrauterine administration once      losartan (COZAAR) 50 MG tablet TAKE 1 TABLET(50 MG) BY MOUTH EVERY DAY 90 tablet 3    multivitamin capsule 1 cap(s)      OZEMPIC 1 mg/dose (4 mg/3 mL) Inject 1 mg into the skin every 7 days.      triamterene-hydrochlorothiazide 37.5-25 mg (MAXZIDE-25) 37.5-25 mg per tablet TAKE 1 TABLET BY MOUTH EVERY DAY 90 tablet 3    TUMERIC-GING-OLIVE-OREG-CAPRYL ORAL Take by mouth.      UBIQUINOL, BULK, MISC by Misc.(Non-Drug; Combo Route) route.       Facility-Administered Encounter Medications as of 9/22/2023   Medication Dose Route Frequency Provider Last Rate Last Admin    0.9%  NaCl infusion   Intravenous Continuous Gil Paulino MD           Allergies:  Review of patient's allergies indicates:   Allergen Reactions    Lisinopril      Other reaction(s): cough    Sulfa (sulfonamide antibiotics) Hives       Health Maintenance:  Immunization History   Administered Date(s) Administered    COVID-19, MRNA, LN-S, PF (MODERNA FULL 0.5 ML DOSE) 01/22/2021, 02/20/2021, 12/17/2021    Influenza 10/04/2012    Influenza - Quadrivalent - MDCK - PF 10/10/2018    Influenza - Quadrivalent - PF *Preferred* (6 months and older) 12/06/2017, 10/04/2019, 10/08/2020, 11/30/2022    Influenza - Trivalent - PF (ADULT) 10/04/2012     Influenza Split 12/06/2017    Tdap 10/04/2019      Health Maintenance   Topic Date Due    Hepatitis C Screening  Never done    Shingles Vaccine (1 of 2) Never done    Mammogram  03/17/2024    Lipid Panel  03/24/2024    Colorectal Cancer Screening  04/29/2024    TETANUS VACCINE  10/04/2029        Physical Exam      Vital Signs  Pulse: 104  SpO2: 99 %  BP: 112/82  BP Location: Right arm  Patient Position: Sitting  Pain Score: 0-No pain  Height and Weight  Height: 6' (182.9 cm)  Weight: 112.3 kg (247 lb 9.2 oz)  BSA (Calculated - sq m): 2.39 sq meters  BMI (Calculated): 33.6  Weight in (lb) to have BMI = 25: 183.9]    Physical Exam  Vitals reviewed.   Constitutional:       Appearance: She is well-developed.   HENT:      Head: Normocephalic and atraumatic.      Right Ear: External ear normal.      Left Ear: External ear normal.   Cardiovascular:      Rate and Rhythm: Normal rate and regular rhythm.      Heart sounds: Normal heart sounds. No murmur heard.  Pulmonary:      Effort: Pulmonary effort is normal.      Breath sounds: Normal breath sounds. No wheezing or rales.   Abdominal:      General: Bowel sounds are normal. There is no distension.      Palpations: Abdomen is soft.      Tenderness: There is no abdominal tenderness.          Laboratory:  CBC:  Recent Labs   Lab 05/13/22  0731 10/13/22  1402 03/24/23  0832   WBC 6.9 8.33 6.5   RBC 5.30 5.18 5.01   Hemoglobin 15.5 15.4 14.0   Hematocrit 46.3 44.7 42.2   Platelets 347 359 297   MCV 87.3 86 84.2   MCH 29.4 29.7 27.9   MCHC 33.6 34.5 33.1     CMP:  Recent Labs   Lab 12/31/20  0712 01/21/22  0715 05/13/22  0731 10/13/22  1402 03/24/23  0832   Glucose 94  --  102 H   < > 94   Calcium 9.1   < > 9.3   < > 9.6   Albumin 4.2  --   --   --   --    ALBUMIN  --    < > 5.0  --  4.7   Total Protein 6.5   < > 7.5  --  7.0   Sodium 138   < > 138   < > 139   Potassium 4.7   < > 4.2   < > 4.3   CO2 29  --  23   < > 26   Carbon Dioxide  --    < >  --   --   --    Chloride 105  --   102   < > 101   BUN 14   < > 15.0   < > 15.0   Alkaline Phosphatase  --    < > 66  --  65   ALT 12  --  15  --  16   AST 12  --  14  --  21   Total Bilirubin 0.7  --  0.7  --  0.8    < > = values in this interval not displayed.     URINALYSIS:  Recent Labs   Lab 10/13/22  1315   Color, UA Yellow   Specific Gravity, UA 1.025   pH, UA 5.0   Protein, UA Negative   Bacteria Rare   Nitrite, UA Negative   Leukocytes, UA Negative   Urobilinogen, UA Negative      LIPIDS:  Recent Labs   Lab 12/31/20  0712 05/13/22  0731 03/24/23  0832   TSH 2.66 2.59 2.35   HDL 51 56 54   Cholesterol 184 184 184   Triglycerides 63 88 91   LDL Calculated  --  114 115   LDL Cholesterol 118 H  --   --    HDL/Cholesterol Ratio 3.6  --   --    Non HDL Chol. (LDL+VLDL) 133 H  --   --      TSH:  Recent Labs   Lab 12/31/20  0712 05/13/22  0731 03/24/23  0832   TSH 2.66 2.59 2.35     A1C:  Recent Labs   Lab 12/31/20  0712 05/13/22  0733 03/24/23  0832   Hemoglobin A1C 5.1 5.0 5.5       Assessment/Plan     Nell Sanches is a 53 y.o.female with:    1. Essential hypertension  Continue current meds.    2. Mixed hyperlipidemia  Counseled on diet and exercise  3. Impaired fasting glucose  - Hemoglobin A1C; Future  - Comprehensive Metabolic Panel; Future  - Hemoglobin A1C  - Comprehensive Metabolic Panel  Counseled on diet and exercise.  4. Anxiety  Stable.  5. GERD without esophagitis  Controlling with pepcid as needed.  6. Thyroid nodule  Last thyroid function stable.     Chronic conditions status updated as per HPI.  Other than changes above, cont current medications and maintain follow up with specialists.  No follow-ups on file.    Future Appointments   Date Time Provider Department Center   10/24/2023  1:00 PM Jamey Sepulveda IV, MD [id number]   10/24/2023  2:30 PM PRE-ADMIT, [facility]       Juanito Cabral MD  [facility]

## 2023-10-12 ENCOUNTER — TELEPHONE (OUTPATIENT)
Dept: OBSTETRICS AND GYNECOLOGY | Facility: CLINIC | Age: 53
End: 2023-10-12
Payer: COMMERCIAL

## 2023-10-12 NOTE — TELEPHONE ENCOUNTER
----- Message from Chantelle Guerra MA sent at 10/12/2023  9:46 AM CDT -----  Name of Who is Calling:MONA PITTMAN [[id number]]                What is the request in detail: Pt states she did get the biopsy and will have the results faxed over.                 Can the clinic reply by [facility]: No                [facility] Back [facility]: [phone]

## 2023-10-24 ENCOUNTER — HOSPITAL ENCOUNTER (OUTPATIENT)
Dept: PREADMISSION TESTING | Facility: OTHER | Age: 53
Discharge: HOME | End: 2023-10-24
Attending: OBSTETRICS & GYNECOLOGY
Payer: COMMERCIAL

## 2023-10-24 ENCOUNTER — OFFICE VISIT (OUTPATIENT)
Dept: OBSTETRICS AND GYNECOLOGY | Facility: CLINIC | Age: 53
End: 2023-10-24
Payer: COMMERCIAL

## 2023-10-24 ENCOUNTER — ANESTHESIA EVENT (OUTPATIENT)
Dept: SURGERY | Facility: OTHER | Age: 53
End: 2023-10-24
Payer: COMMERCIAL

## 2023-10-24 VITALS
HEART RATE: 76 BPM | DIASTOLIC BLOOD PRESSURE: 84 MMHG | SYSTOLIC BLOOD PRESSURE: 136 MMHG | WEIGHT: 252 LBS | BODY MASS INDEX: 34.13 KG/M2 | TEMPERATURE: 98 F | OXYGEN SATURATION: 98 % | HEIGHT: 72 IN

## 2023-10-24 VITALS
SYSTOLIC BLOOD PRESSURE: 114 MMHG | HEIGHT: 72 IN | WEIGHT: 252.19 LBS | BODY MASS INDEX: 34.16 KG/M2 | DIASTOLIC BLOOD PRESSURE: 70 MMHG

## 2023-10-24 DIAGNOSIS — Z97.5 IUD (INTRAUTERINE DEVICE) IN PLACE: ICD-10-CM

## 2023-10-24 DIAGNOSIS — N92.1 MENOMETRORRHAGIA: ICD-10-CM

## 2023-10-24 DIAGNOSIS — Z01.818 PREOP TESTING: Primary | ICD-10-CM

## 2023-10-24 DIAGNOSIS — D25.1 FIBROIDS, INTRAMURAL: Primary | ICD-10-CM

## 2023-10-24 DIAGNOSIS — N85.2 BULKY OR ENLARGED UTERUS: ICD-10-CM

## 2023-10-24 LAB
ABO + RH BLD: NORMAL
ANION GAP SERPL CALC-SCNC: 10 MMOL/L (ref 8–16)
BASOPHILS # BLD AUTO: 0.09 K/UL (ref 0–0.2)
BASOPHILS NFR BLD: 1.2 % (ref 0–1.9)
BLD GP AB SCN CELLS X3 SERPL QL: NORMAL
BUN SERPL-MCNC: 16 MG/DL (ref 6–20)
CALCIUM SERPL-MCNC: 10 MG/DL (ref 8.7–10.5)
CHLORIDE SERPL-SCNC: 103 MMOL/L (ref 95–110)
CO2 SERPL-SCNC: 24 MMOL/L (ref 23–29)
CREAT SERPL-MCNC: 1.1 MG/DL (ref 0.5–1.4)
DIFFERENTIAL METHOD: NORMAL
EOSINOPHIL # BLD AUTO: 0.2 K/UL (ref 0–0.5)
EOSINOPHIL NFR BLD: 2.1 % (ref 0–8)
ERYTHROCYTE [DISTWIDTH] IN BLOOD BY AUTOMATED COUNT: 11.9 % (ref 11.5–14.5)
EST. GFR  (NO RACE VARIABLE): 60 ML/MIN/1.73 M^2
GLUCOSE SERPL-MCNC: 96 MG/DL (ref 70–110)
HCT VFR BLD AUTO: 37.7 % (ref 37–48.5)
HGB BLD-MCNC: 12.3 G/DL (ref 12–16)
IMM GRANULOCYTES # BLD AUTO: 0.02 K/UL (ref 0–0.04)
IMM GRANULOCYTES NFR BLD AUTO: 0.3 % (ref 0–0.5)
LYMPHOCYTES # BLD AUTO: 2.3 K/UL (ref 1–4.8)
LYMPHOCYTES NFR BLD: 30.6 % (ref 18–48)
MCH RBC QN AUTO: 27 PG (ref 27–31)
MCHC RBC AUTO-ENTMCNC: 32.6 G/DL (ref 32–36)
MCV RBC AUTO: 83 FL (ref 82–98)
MONOCYTES # BLD AUTO: 0.5 K/UL (ref 0.3–1)
MONOCYTES NFR BLD: 7.1 % (ref 4–15)
NEUTROPHILS # BLD AUTO: 4.4 K/UL (ref 1.8–7.7)
NEUTROPHILS NFR BLD: 58.7 % (ref 38–73)
NRBC BLD-RTO: 0 /100 WBC
PLATELET # BLD AUTO: 427 K/UL (ref 150–450)
PMV BLD AUTO: 10.1 FL (ref 9.2–12.9)
POTASSIUM SERPL-SCNC: 4.4 MMOL/L (ref 3.5–5.1)
RBC # BLD AUTO: 4.56 M/UL (ref 4–5.4)
SODIUM SERPL-SCNC: 137 MMOL/L (ref 136–145)
SPECIMEN OUTDATE: NORMAL
WBC # BLD AUTO: 7.57 K/UL (ref 3.9–12.7)

## 2023-10-24 PROCEDURE — 3044F PR MOST RECENT HEMOGLOBIN A1C LEVEL <7.0%: ICD-10-PCS | Mod: CPTII,S$GLB,, | Performed by: OBSTETRICS & GYNECOLOGY

## 2023-10-24 PROCEDURE — 3074F PR MOST RECENT SYSTOLIC BLOOD PRESSURE < 130 MM HG: ICD-10-PCS | Mod: CPTII,S$GLB,, | Performed by: OBSTETRICS & GYNECOLOGY

## 2023-10-24 PROCEDURE — 4010F ACE/ARB THERAPY RXD/TAKEN: CPT | Mod: CPTII,S$GLB,, | Performed by: OBSTETRICS & GYNECOLOGY

## 2023-10-24 PROCEDURE — 3074F SYST BP LT 130 MM HG: CPT | Mod: CPTII,S$GLB,, | Performed by: OBSTETRICS & GYNECOLOGY

## 2023-10-24 PROCEDURE — 99214 PR OFFICE/OUTPT VISIT, EST, LEVL IV, 30-39 MIN: ICD-10-PCS | Mod: S$GLB,,, | Performed by: OBSTETRICS & GYNECOLOGY

## 2023-10-24 PROCEDURE — 3078F DIAST BP <80 MM HG: CPT | Mod: CPTII,S$GLB,, | Performed by: OBSTETRICS & GYNECOLOGY

## 2023-10-24 PROCEDURE — 1159F MED LIST DOCD IN RCRD: CPT | Mod: CPTII,S$GLB,, | Performed by: OBSTETRICS & GYNECOLOGY

## 2023-10-24 PROCEDURE — 99999 PR PBB SHADOW E&M-EST. PATIENT-LVL III: CPT | Mod: PBBFAC,,, | Performed by: OBSTETRICS & GYNECOLOGY

## 2023-10-24 PROCEDURE — 80048 BASIC METABOLIC PNL TOTAL CA: CPT | Performed by: ANESTHESIOLOGY

## 2023-10-24 PROCEDURE — 99214 OFFICE O/P EST MOD 30 MIN: CPT | Mod: S$GLB,,, | Performed by: OBSTETRICS & GYNECOLOGY

## 2023-10-24 PROCEDURE — 3044F HG A1C LEVEL LT 7.0%: CPT | Mod: CPTII,S$GLB,, | Performed by: OBSTETRICS & GYNECOLOGY

## 2023-10-24 PROCEDURE — 85025 COMPLETE CBC W/AUTO DIFF WBC: CPT | Performed by: ANESTHESIOLOGY

## 2023-10-24 PROCEDURE — 1159F PR MEDICATION LIST DOCUMENTED IN MEDICAL RECORD: ICD-10-PCS | Mod: CPTII,S$GLB,, | Performed by: OBSTETRICS & GYNECOLOGY

## 2023-10-24 PROCEDURE — 86901 BLOOD TYPING SEROLOGIC RH(D): CPT | Performed by: ANESTHESIOLOGY

## 2023-10-24 PROCEDURE — 36415 COLL VENOUS BLD VENIPUNCTURE: CPT | Performed by: ANESTHESIOLOGY

## 2023-10-24 PROCEDURE — 3008F PR BODY MASS INDEX (BMI) DOCUMENTED: ICD-10-PCS | Mod: CPTII,S$GLB,, | Performed by: OBSTETRICS & GYNECOLOGY

## 2023-10-24 PROCEDURE — 3078F PR MOST RECENT DIASTOLIC BLOOD PRESSURE < 80 MM HG: ICD-10-PCS | Mod: CPTII,S$GLB,, | Performed by: OBSTETRICS & GYNECOLOGY

## 2023-10-24 PROCEDURE — 4010F PR ACE/ARB THEARPY RXD/TAKEN: ICD-10-PCS | Mod: CPTII,S$GLB,, | Performed by: OBSTETRICS & GYNECOLOGY

## 2023-10-24 PROCEDURE — 99999 PR PBB SHADOW E&M-EST. PATIENT-LVL III: ICD-10-PCS | Mod: PBBFAC,,, | Performed by: OBSTETRICS & GYNECOLOGY

## 2023-10-24 PROCEDURE — 3008F BODY MASS INDEX DOCD: CPT | Mod: CPTII,S$GLB,, | Performed by: OBSTETRICS & GYNECOLOGY

## 2023-10-24 RX ORDER — ACETAMINOPHEN 500 MG
1000 TABLET ORAL
Status: CANCELLED | OUTPATIENT
Start: 2023-10-30

## 2023-10-24 RX ORDER — SODIUM CHLORIDE 9 MG/ML
INJECTION, SOLUTION INTRAVENOUS CONTINUOUS
Status: CANCELLED | OUTPATIENT
Start: 2023-10-30

## 2023-10-24 RX ORDER — SODIUM CHLORIDE, SODIUM LACTATE, POTASSIUM CHLORIDE, CALCIUM CHLORIDE 600; 310; 30; 20 MG/100ML; MG/100ML; MG/100ML; MG/100ML
INJECTION, SOLUTION INTRAVENOUS CONTINUOUS
Status: CANCELLED | OUTPATIENT
Start: 2023-10-24

## 2023-10-24 RX ORDER — LIDOCAINE HYDROCHLORIDE 10 MG/ML
0.5 INJECTION, SOLUTION EPIDURAL; INFILTRATION; INTRACAUDAL; PERINEURAL ONCE
Status: CANCELLED | OUTPATIENT
Start: 2023-10-24 | End: 2023-10-24

## 2023-10-24 RX ORDER — GLUCOSAMINE/CHONDRO SU A 500-400 MG
1 TABLET ORAL 3 TIMES DAILY
COMMUNITY

## 2023-10-24 RX ORDER — CEPHALEXIN 500 MG/1
CAPSULE ORAL
COMMUNITY
Start: 2023-10-15

## 2023-10-24 RX ORDER — FAMOTIDINE 20 MG/1
20 TABLET, FILM COATED ORAL
Status: CANCELLED | OUTPATIENT
Start: 2023-10-30

## 2023-10-24 RX ORDER — NORETHINDRONE 5 MG/1
5 TABLET ORAL
COMMUNITY
Start: 2023-09-28 | End: 2023-12-11

## 2023-10-24 NOTE — ANESTHESIA PREPROCEDURE EVALUATION
10/24/2023  Nell Sanches is a 53 y.o., female.      Pre-op Assessment    I have reviewed the Patient Summary Reports.     I have reviewed the Nursing Notes. I have reviewed the NPO Status.   I have reviewed the Medications.     Review of Systems  Anesthesia Hx:  No previous Anesthesia  History of prior surgery of interest to airway management or planning: Denies Family Hx of Anesthesia complications.   Denies Personal Hx of Anesthesia complications.   Social:  Non-Smoker    Hematology/Oncology:  Hematology Normal   Oncology Normal     EENT/Dental:EENT/Dental Normal   Cardiovascular:   Hypertension    Pulmonary:  Pulmonary Normal    Renal/:  Renal/ Normal     Hepatic/GI:   GERD, well controlled    Musculoskeletal:   Arthritis     Neurological:  Neurology Normal    Endocrine:  Endocrine Normal Was taking Ozempic Obesity / BMI > 30  Dermatological:  Skin Normal    Psych:  Psychiatric Normal           Physical Exam  General: Cooperative, Alert and Oriented    Airway:  Mallampati: II   Mouth Opening: Normal  Neck ROM: Normal ROM    Dental:  Intact        Anesthesia Plan  Type of Anesthesia, risks & benefits discussed:    Anesthesia Type: Gen ETT  Intra-op Monitoring Plan: Standard ASA Monitors  Post Op Pain Control Plan: multimodal analgesia  Induction:  IV  Informed Consent: Informed consent signed with the Patient and all parties understand the risks and agree with anesthesia plan.  All questions answered.   ASA Score: 3  Anesthesia Plan Notes: Labs today w T and S    Ready For Surgery From Anesthesia Perspective.     .

## 2023-10-24 NOTE — H&P (VIEW-ONLY)
CC:  Heavy, irregular uterine bleeding secondary to fibroids    HPI : Nell Sanches is a 53 y.o.   patient who presents for evaluation and discussion of treatment options for heavy, irregular uterine bleeding secondary to fibroids.  Patient has been treated with Mirena IUD (in place currently) with no resolution of symptoms.  Patient is considering definitive surgical management with a minimally invasive hysterectomy.  Patient reports continued heavy bleeding with mild, intermittent relief from heavy bleeding with norethindrone by mouth treatment.  Patient is here to discuss minimally invasive hysterectomy risks and benefits and for possible scheduling.    Chart reviewed.    Outside endometrial biopsy from Dr. Ele Gustafson in early 2023 reviewed.  Pelvic sonogram reports reviewed.    Past Medical History:   Diagnosis Date    Abnormal weight gain 10/02/2012    Achilles tendonitis     left    Arthritis     Digestive disorder     Gastro-esophageal reflux disease without esophagitis     Hypertension     Other and unspecified hyperlipidemia 10/02/2012    Unspecified vitamin D deficiency 10/02/2012     Past Surgical History:   Procedure Laterality Date    BREAST BIOPSY Right      SECTION      CHOLECYSTECTOMY      COLONOSCOPY      HIP ARTHROPLASTY Right 10/25/2022    Procedure: ARTHROPLASTY, HIP TOTAL;  Surgeon: Gil Paulino MD;  Location: [facility];  Service: Orthopedics;  Laterality: Right;    HYSTEROSCOPY WITH DILATION AND CURETTAGE OF UTERUS      and iud retrieval     Family History   Problem Relation Age of Onset    Colon cancer Maternal Grandmother 70     Social History     Tobacco Use    Smoking status: Never    Smokeless tobacco: Never   Substance Use Topics    Alcohol use: Yes     Comment: rare    Drug use: Never     OB History    Para Term  AB Living   1 1 1         SAB IAB Ectopic Multiple Live Births                  # Outcome Date GA Lbr José Luis/2nd Weight Sex  Delivery Anes PTL Lv   1 Term                /70   Ht 6' (1.829 m)   Wt 114.4 kg (252 lb 3.3 oz)   LMP  (LMP Unknown)   BMI 34.21 kg/m²     ROS:  GENERAL: Feeling well overall.   SKIN: Denies rash or lesions.   HEAD: Denies head injury or headache.   NODES: Denies enlarged lymph nodes.   CHEST: Denies chest pain or shortness of breath.   CARDIOVASCULAR: Denies palpitations or left sided chest pain.   ABDOMEN: No abdominal pain, constipation, diarrhea, nausea, vomiting or rectal bleeding.   URINARY: No frequency, dysuria, hematuria, or burning on urination.  REPRODUCTIVE: See HPI.   BREASTS: Denies pain, lumps, or nipple discharge.   HEMATOLOGIC: No easy bruisability.  MUSCULOSKELETAL: Denies joint pain or swelling.   NEUROLOGIC: Denies syncope or weakness.   PSYCHIATRIC: Denies depression, anxiety or mood swings.      PHYSICAL EXAM:  APPEARANCE: Well nourished, well developed, in no acute distress.  AFFECT: WNL, alert and oriented x 3  SKIN: No acne or hirsutism  NECK: Neck symmetric without masses or thyromegaly  NODES: No inguinal, cervical, axillary, or femoral lymph node enlargement  CHEST: Good respiratory effect  ABDOMEN: Soft.  No tenderness or masses.  No hepatosplenomegaly.  No hernias.  Healed Pfannenstiel skin incision noted.  PELVIC: Normal external genitalia without lesions.  Normal hair distribution.  Adequate perineal body, normal urethral meatus.  Vagina moist and well rugated without lesions or discharge.  Cervix pink, without lesions, discharge or tenderness.  No significant cystocele or rectocele.  Bimanual exam shows uterus to be 12 weeks size, irregular contour consistent with fibroids, mobile and nontender.  Uterus is significantly anteflexed/anteverted.  Adnexa without masses or tenderness.    EXTREMITIES: No edema.    ASSESSMENT:  1. Fibroids, intramural    2. Menometrorrhagia    3. Bulky or enlarged uterus    4. IUD (intrauterine device) in place      I have discussed the risks,  benefits, indications, and alternatives of the procedure in detail.  The patient verbalizes her understanding.  All questions answered.  Consents signed.  The patient agrees to proceed to surgery scheduling.    Plan:  Robotic assisted hysterectomy with conservation of ovaries.  Patient counseled on possible need for contained tissue extraction due to uterine size..  Patient verbalized understanding of this discussion.    Jamey Sepulveda IV, MD

## 2023-10-24 NOTE — DISCHARGE INSTRUCTIONS
Information to Prepare you for your Surgery    PRE-ADMIT TESTING -  [phone]    [address] [facility]          Your surgery has been scheduled at [facility]. We are pleased to have the opportunity to serve you. For Further Information please call [phone].    On the day of surgery please report to the Information Desk on the 1st floor.    CONTACT YOUR PHYSICIAN'S OFFICE THE DAY PRIOR TO YOUR SURGERY TO OBTAIN YOUR ARRIVAL TIME.     The evening before surgery do not eat anything after 9 p.m. ( this includes hard candy, chewing gum and mints).  You may only have GATORADE, POWERADE AND WATER  from 9 p.m. until you leave your home.   DO NOT DRINK ANY LIQUIDS ON THE WAY TO THE HOSPITAL.      Why does your anesthesiologist allow you to drink Gatorade/Powerade before surgery?  Gatorade/Powerade helps to increase your comfort before surgery and to decrease your nausea after surgery. The carbohydrates in Gatorade/Powerade help reduce your body's stress response to surgery.  If you are a diabetic-drink only water prior to surgery.    Outpatient Surgery- May allow 2 adult (18 and older) Support Persons (1 being the designated ) for all surgical/procedural patients. A breastfeeding mother will be allowed her infant and 2 adult Support Persons. No one under the age of 18 will be allowed in the building.      SPECIAL MEDICATION INSTRUCTIONS: TAKE medications checked off by the Anesthesiologist on your Medication List.    Angiogram Patients: Take medications as instructed by your physician, including aspirin.     Surgery Patients:    If you take ASPIRIN - Your PHYSICIAN/SURGEON will need to inform you IF/OR when you need to stop taking aspirin prior to your surgery.     The week prior to surgery do not ot take any medications containing IBUPROFEN or NSAIDS ( Advil, Motrin, Goodys, BC, Aleve, Naproxen etc) If you are not sure if you should take a medicine  please call your surgeon's office.  Ok to take Tylenol    Do Not Wear any make-up (especially eye make-up) to surgery. Please remove any false eyelashes or eyelash extensions. If you arrive the day of surgery with makeup/eyelashes on you will be required to remove prior to surgery. (There is a risk of corneal abrasions if eye makeup/eyelash extensions are not removed)      Leave all valuables at home.   Do Not wear any jewelry or watches, including any metal in body piercings. Jewelry must be removed prior to coming to the hospital.  There is a possibility that rings that are unable to be removed may be cut off if they are on the surgical extremity.    Please remove all hair extensions, wigs, clips and any other metal accessories/ ornaments from your hair.  These items may pose a flammable/fire risk in Surgery and must be removed.    Do not shave your surgical area at least 5 days prior to your surgery. The surgical prep will be performed at the hospital according to Infection Control regulations.    Contact Lens must be removed before surgery. Either do not wear the contact lens or bring a case and solution for storage.  Please bring a container for eyeglasses or dentures as required.  Bring any paperwork your physician has provided, such as consent forms,  history and physicals, doctor's orders, etc.   Bring comfortable clothes that are loose fitting to wear upon discharge. Take into consideration the type of surgery being performed.  Maintain your diet as advised per your physician the day prior to surgery.      Adequate rest the night before surgery is advised.   Park in the Parking lot behind the hospital or in the [facility] across the street from the parking lot. Parking is complimentary.  If you will be discharged the same day as your procedure, please arrange for a responsible adult to drive you home or to accompany you if traveling by taxi.   YOU WILL NOT BE PERMITTED TO DRIVE OR TO LEAVE THE  HOSPITAL ALONE AFTER SURGERY.   If you are being discharged the same day, it is strongly recommended that you arrange for someone to remain with you for the first 24 hrs following your surgery.    The Surgeon will speak to your family/visitor after your surgery regarding the outcome of your surgery and post op care.  The Surgeon may speak to you after your surgery, but there is a possibility you may not remember the details.  Please check with your family members regarding the conversation with the Surgeon.    We strongly recommend whoever is bringing you home be present for discharge instructions.  This will ensure a thorough understanding for your post op home care.          Thank you for your cooperation.  The Staff of [facility].            Bathing Instructions with Hibiclens    Shower the evening before and morning of your procedure with Chlorhexidine (Hibiclens)  do not use Chlorhexidine on your face or genitals. Do not get in your eyes.  Wash your face with water and your regular face wash/soap  Use your regular shampoo  Apply Chlorhexidine (Hibiclens) directly on your skin or on a wet washcloth and wash gently. When showering: Move away from the shower stream when applying Chlorhexidine (Hibiclens) to avoid rinsing off too soon.  Rinse thoroughly with warm water  Do not dilute Chlorhexidine (Hibiclens)   Dry off as usual, do not use any deodorant, powder, body lotions, perfume, after shave or cologne.

## 2023-10-24 NOTE — PROGRESS NOTES
CC:  Heavy, irregular uterine bleeding secondary to fibroids    HPI : Nell Sanches is a 53 y.o.   patient who presents for evaluation and discussion of treatment options for heavy, irregular uterine bleeding secondary to fibroids.  Patient has been treated with Mirena IUD (in place currently) with no resolution of symptoms.  Patient is considering definitive surgical management with a minimally invasive hysterectomy.  Patient reports continued heavy bleeding with mild, intermittent relief from heavy bleeding with norethindrone by mouth treatment.  Patient is here to discuss minimally invasive hysterectomy risks and benefits and for possible scheduling.    Chart reviewed.    Outside endometrial biopsy from Dr. Ele Gustafson in early 2023 reviewed.  Pelvic sonogram reports reviewed.    Past Medical History:   Diagnosis Date    Abnormal weight gain 10/02/2012    Achilles tendonitis     left    Arthritis     Digestive disorder     Gastro-esophageal reflux disease without esophagitis     Hypertension     Other and unspecified hyperlipidemia 10/02/2012    Unspecified vitamin D deficiency 10/02/2012     Past Surgical History:   Procedure Laterality Date    BREAST BIOPSY Right      SECTION      CHOLECYSTECTOMY      COLONOSCOPY      HIP ARTHROPLASTY Right 10/25/2022    Procedure: ARTHROPLASTY, HIP TOTAL;  Surgeon: Gil Paulino MD;  Location: [facility];  Service: Orthopedics;  Laterality: Right;    HYSTEROSCOPY WITH DILATION AND CURETTAGE OF UTERUS      and iud retrieval     Family History   Problem Relation Age of Onset    Colon cancer Maternal Grandmother 70     Social History     Tobacco Use    Smoking status: Never    Smokeless tobacco: Never   Substance Use Topics    Alcohol use: Yes     Comment: rare    Drug use: Never     OB History    Para Term  AB Living   1 1 1         SAB IAB Ectopic Multiple Live Births                  # Outcome Date GA Lbr José Luis/2nd Weight Sex  Delivery Anes PTL Lv   1 Term                /70   Ht 6' (1.829 m)   Wt 114.4 kg (252 lb 3.3 oz)   LMP  (LMP Unknown)   BMI 34.21 kg/m²     ROS:  GENERAL: Feeling well overall.   SKIN: Denies rash or lesions.   HEAD: Denies head injury or headache.   NODES: Denies enlarged lymph nodes.   CHEST: Denies chest pain or shortness of breath.   CARDIOVASCULAR: Denies palpitations or left sided chest pain.   ABDOMEN: No abdominal pain, constipation, diarrhea, nausea, vomiting or rectal bleeding.   URINARY: No frequency, dysuria, hematuria, or burning on urination.  REPRODUCTIVE: See HPI.   BREASTS: Denies pain, lumps, or nipple discharge.   HEMATOLOGIC: No easy bruisability.  MUSCULOSKELETAL: Denies joint pain or swelling.   NEUROLOGIC: Denies syncope or weakness.   PSYCHIATRIC: Denies depression, anxiety or mood swings.      PHYSICAL EXAM:  APPEARANCE: Well nourished, well developed, in no acute distress.  AFFECT: WNL, alert and oriented x 3  SKIN: No acne or hirsutism  NECK: Neck symmetric without masses or thyromegaly  NODES: No inguinal, cervical, axillary, or femoral lymph node enlargement  CHEST: Good respiratory effect  ABDOMEN: Soft.  No tenderness or masses.  No hepatosplenomegaly.  No hernias.  Healed Pfannenstiel skin incision noted.  PELVIC: Normal external genitalia without lesions.  Normal hair distribution.  Adequate perineal body, normal urethral meatus.  Vagina moist and well rugated without lesions or discharge.  Cervix pink, without lesions, discharge or tenderness.  No significant cystocele or rectocele.  Bimanual exam shows uterus to be 12 weeks size, irregular contour consistent with fibroids, mobile and nontender.  Uterus is significantly anteflexed/anteverted.  Adnexa without masses or tenderness.    EXTREMITIES: No edema.    ASSESSMENT:  1. Fibroids, intramural    2. Menometrorrhagia    3. Bulky or enlarged uterus    4. IUD (intrauterine device) in place      I have discussed the risks,  benefits, indications, and alternatives of the procedure in detail.  The patient verbalizes her understanding.  All questions answered.  Consents signed.  The patient agrees to proceed to surgery scheduling.    Plan:  Robotic assisted hysterectomy with conservation of ovaries.  Patient counseled on possible need for contained tissue extraction due to uterine size..  Patient verbalized understanding of this discussion.    Jamey Sepulveda IV, MD

## 2023-10-30 ENCOUNTER — ANESTHESIA (OUTPATIENT)
Dept: SURGERY | Facility: OTHER | Age: 53
End: 2023-10-30
Payer: COMMERCIAL

## 2023-10-30 ENCOUNTER — HOSPITAL ENCOUNTER (OUTPATIENT)
Facility: OTHER | Age: 53
Discharge: HOME | End: 2023-10-30
Attending: OBSTETRICS & GYNECOLOGY | Admitting: OBSTETRICS & GYNECOLOGY
Payer: COMMERCIAL

## 2023-10-30 VITALS
OXYGEN SATURATION: 100 % | TEMPERATURE: 98 F | RESPIRATION RATE: 16 BRPM | HEART RATE: 88 BPM | DIASTOLIC BLOOD PRESSURE: 67 MMHG | SYSTOLIC BLOOD PRESSURE: 128 MMHG

## 2023-10-30 DIAGNOSIS — Z97.5 IUD (INTRAUTERINE DEVICE) IN PLACE: ICD-10-CM

## 2023-10-30 DIAGNOSIS — D25.1 FIBROIDS, INTRAMURAL: ICD-10-CM

## 2023-10-30 DIAGNOSIS — N85.2 BULKY OR ENLARGED UTERUS: ICD-10-CM

## 2023-10-30 DIAGNOSIS — N92.1 MENOMETRORRHAGIA: ICD-10-CM

## 2023-10-30 DIAGNOSIS — Z90.710 HX OF HYSTERECTOMY: Primary | ICD-10-CM

## 2023-10-30 LAB
B-HCG UR QL: NEGATIVE
CTP QC/QA: YES
POCT GLUCOSE: 96 MG/DL (ref 70–110)

## 2023-10-30 PROCEDURE — 58662 PR LAP,FULGURATE/EXCISE LESIONS: ICD-10-PCS | Mod: AS,51,, | Performed by: NURSE PRACTITIONER

## 2023-10-30 PROCEDURE — 58662 PR LAP,FULGURATE/EXCISE LESIONS: ICD-10-PCS | Mod: 51,,, | Performed by: OBSTETRICS & GYNECOLOGY

## 2023-10-30 PROCEDURE — 58573 TLH W/T/O UTERUS OVER 250 G: CPT | Mod: AS,,, | Performed by: NURSE PRACTITIONER

## 2023-10-30 PROCEDURE — 27201423 OPTIME MED/SURG SUP & DEVICES STERILE SUPPLY: Performed by: OBSTETRICS & GYNECOLOGY

## 2023-10-30 PROCEDURE — 25000003 PHARM REV CODE 250: Performed by: OBSTETRICS & GYNECOLOGY

## 2023-10-30 PROCEDURE — 81025 URINE PREGNANCY TEST: CPT | Performed by: ANESTHESIOLOGY

## 2023-10-30 PROCEDURE — 36000713 HC OR TIME LEV V EA ADD 15 MIN: Performed by: OBSTETRICS & GYNECOLOGY

## 2023-10-30 PROCEDURE — 88307 PR  SURG PATH,LEVEL V: ICD-10-PCS | Mod: 26,,, | Performed by: PATHOLOGY

## 2023-10-30 PROCEDURE — 63600175 PHARM REV CODE 636 W HCPCS: Performed by: OBSTETRICS & GYNECOLOGY

## 2023-10-30 PROCEDURE — 88307 TISSUE EXAM BY PATHOLOGIST: CPT | Performed by: PATHOLOGY

## 2023-10-30 PROCEDURE — 37000008 HC ANESTHESIA 1ST 15 MINUTES: Performed by: OBSTETRICS & GYNECOLOGY

## 2023-10-30 PROCEDURE — 37000009 HC ANESTHESIA EA ADD 15 MINS: Performed by: OBSTETRICS & GYNECOLOGY

## 2023-10-30 PROCEDURE — 58573 TLH W/T/O UTERUS OVER 250 G: CPT | Mod: ,,, | Performed by: OBSTETRICS & GYNECOLOGY

## 2023-10-30 PROCEDURE — 25000003 PHARM REV CODE 250: Performed by: ANESTHESIOLOGY

## 2023-10-30 PROCEDURE — 25000003 PHARM REV CODE 250: Performed by: NURSE ANESTHETIST, CERTIFIED REGISTERED

## 2023-10-30 PROCEDURE — 58662 LAPAROSCOPY EXCISE LESIONS: CPT | Mod: AS,51,, | Performed by: NURSE PRACTITIONER

## 2023-10-30 PROCEDURE — D9220A PRA ANESTHESIA: Mod: ANES,,, | Performed by: ANESTHESIOLOGY

## 2023-10-30 PROCEDURE — P9045 ALBUMIN (HUMAN), 5%, 250 ML: HCPCS | Mod: JZ,JG | Performed by: NURSE ANESTHETIST, CERTIFIED REGISTERED

## 2023-10-30 PROCEDURE — 71000033 HC RECOVERY, INTIAL HOUR: Performed by: OBSTETRICS & GYNECOLOGY

## 2023-10-30 PROCEDURE — D9220A PRA ANESTHESIA: ICD-10-PCS | Mod: ANES,,, | Performed by: ANESTHESIOLOGY

## 2023-10-30 PROCEDURE — 58662 LAPAROSCOPY EXCISE LESIONS: CPT | Mod: 51,,, | Performed by: OBSTETRICS & GYNECOLOGY

## 2023-10-30 PROCEDURE — 63600175 PHARM REV CODE 636 W HCPCS: Performed by: NURSE ANESTHETIST, CERTIFIED REGISTERED

## 2023-10-30 PROCEDURE — 88307 TISSUE EXAM BY PATHOLOGIST: CPT | Mod: 26,,, | Performed by: PATHOLOGY

## 2023-10-30 PROCEDURE — 58573 PR LAPAROSCOPY TOT HYSTERECTOMY UTERUS >250 GRAM W TUBE/OVARY: ICD-10-PCS | Mod: ,,, | Performed by: OBSTETRICS & GYNECOLOGY

## 2023-10-30 PROCEDURE — 58573 PR LAPAROSCOPY TOT HYSTERECTOMY UTERUS >250 GRAM W TUBE/OVARY: ICD-10-PCS | Mod: AS,,, | Performed by: NURSE PRACTITIONER

## 2023-10-30 PROCEDURE — 63600175 PHARM REV CODE 636 W HCPCS: Performed by: ANESTHESIOLOGY

## 2023-10-30 PROCEDURE — 88305 TISSUE EXAM BY PATHOLOGIST: CPT | Mod: 59 | Performed by: PATHOLOGY

## 2023-10-30 PROCEDURE — D9220A PRA ANESTHESIA: ICD-10-PCS | Mod: CRNA,,, | Performed by: NURSE ANESTHETIST, CERTIFIED REGISTERED

## 2023-10-30 PROCEDURE — 88305 TISSUE EXAM BY PATHOLOGIST: CPT | Mod: 26,,, | Performed by: PATHOLOGY

## 2023-10-30 PROCEDURE — 36000712 HC OR TIME LEV V 1ST 15 MIN: Performed by: OBSTETRICS & GYNECOLOGY

## 2023-10-30 PROCEDURE — 82962 GLUCOSE BLOOD TEST: CPT | Performed by: OBSTETRICS & GYNECOLOGY

## 2023-10-30 PROCEDURE — 88305 TISSUE EXAM BY PATHOLOGIST: ICD-10-PCS | Mod: 26,,, | Performed by: PATHOLOGY

## 2023-10-30 PROCEDURE — 71000016 HC POSTOP RECOV ADDL HR: Performed by: OBSTETRICS & GYNECOLOGY

## 2023-10-30 PROCEDURE — 71000015 HC POSTOP RECOV 1ST HR: Performed by: OBSTETRICS & GYNECOLOGY

## 2023-10-30 PROCEDURE — D9220A PRA ANESTHESIA: Mod: CRNA,,, | Performed by: NURSE ANESTHETIST, CERTIFIED REGISTERED

## 2023-10-30 RX ORDER — HYDROMORPHONE HYDROCHLORIDE 2 MG/ML
0.2 INJECTION, SOLUTION INTRAMUSCULAR; INTRAVENOUS; SUBCUTANEOUS
Status: CANCELLED | OUTPATIENT
Start: 2023-10-30

## 2023-10-30 RX ORDER — DEXAMETHASONE SODIUM PHOSPHATE 4 MG/ML
INJECTION, SOLUTION INTRA-ARTICULAR; INTRALESIONAL; INTRAMUSCULAR; INTRAVENOUS; SOFT TISSUE
Status: DISCONTINUED | OUTPATIENT
Start: 2023-10-30 | End: 2023-10-30

## 2023-10-30 RX ORDER — LIDOCAINE HYDROCHLORIDE 10 MG/ML
0.5 INJECTION, SOLUTION EPIDURAL; INFILTRATION; INTRACAUDAL; PERINEURAL ONCE
Status: DISCONTINUED | OUTPATIENT
Start: 2023-10-30 | End: 2023-10-30 | Stop reason: HOSPADM

## 2023-10-30 RX ORDER — DIPHENHYDRAMINE HYDROCHLORIDE 50 MG/ML
INJECTION INTRAMUSCULAR; INTRAVENOUS
Status: DISCONTINUED | OUTPATIENT
Start: 2023-10-30 | End: 2023-10-30

## 2023-10-30 RX ORDER — ACETAMINOPHEN 500 MG
1000 TABLET ORAL
Status: COMPLETED | OUTPATIENT
Start: 2023-10-30 | End: 2023-10-30

## 2023-10-30 RX ORDER — OXYCODONE HYDROCHLORIDE 5 MG/1
5 TABLET ORAL EVERY 4 HOURS PRN
Qty: 10 TABLET | Refills: 0 | Status: SHIPPED | OUTPATIENT
Start: 2023-10-30 | End: 2023-12-11

## 2023-10-30 RX ORDER — KETOROLAC TROMETHAMINE 30 MG/ML
15 INJECTION, SOLUTION INTRAMUSCULAR; INTRAVENOUS EVERY 6 HOURS PRN
Status: CANCELLED | OUTPATIENT
Start: 2023-10-30 | End: 2023-11-02

## 2023-10-30 RX ORDER — SODIUM CHLORIDE, SODIUM LACTATE, POTASSIUM CHLORIDE, CALCIUM CHLORIDE 600; 310; 30; 20 MG/100ML; MG/100ML; MG/100ML; MG/100ML
INJECTION, SOLUTION INTRAVENOUS CONTINUOUS
Status: DISCONTINUED | OUTPATIENT
Start: 2023-10-30 | End: 2023-10-30 | Stop reason: HOSPADM

## 2023-10-30 RX ORDER — DEXTROMETHORPHAN HYDROBROMIDE, GUAIFENESIN 5; 100 MG/5ML; MG/5ML
650 LIQUID ORAL EVERY 6 HOURS
Qty: 30 TABLET | Refills: 1 | Status: SHIPPED | OUTPATIENT
Start: 2023-10-30 | End: 2023-12-11

## 2023-10-30 RX ORDER — IBUPROFEN 600 MG/1
600 TABLET ORAL EVERY 6 HOURS
Qty: 30 TABLET | Refills: 1 | Status: SHIPPED | OUTPATIENT
Start: 2023-10-30 | End: 2023-12-11

## 2023-10-30 RX ORDER — EPHEDRINE SULFATE 50 MG/ML
INJECTION, SOLUTION INTRAVENOUS
Status: DISCONTINUED | OUTPATIENT
Start: 2023-10-30 | End: 2023-10-30

## 2023-10-30 RX ORDER — DIPHENHYDRAMINE HYDROCHLORIDE 50 MG/ML
25 INJECTION INTRAMUSCULAR; INTRAVENOUS EVERY 6 HOURS PRN
Status: DISCONTINUED | OUTPATIENT
Start: 2023-10-30 | End: 2023-10-30 | Stop reason: HOSPADM

## 2023-10-30 RX ORDER — FAMOTIDINE 20 MG/1
20 TABLET, FILM COATED ORAL
Status: COMPLETED | OUTPATIENT
Start: 2023-10-30 | End: 2023-10-30

## 2023-10-30 RX ORDER — LIDOCAINE HYDROCHLORIDE 20 MG/ML
INJECTION INTRAVENOUS
Status: DISCONTINUED | OUTPATIENT
Start: 2023-10-30 | End: 2023-10-30

## 2023-10-30 RX ORDER — HYDROMORPHONE HYDROCHLORIDE 2 MG/ML
INJECTION, SOLUTION INTRAMUSCULAR; INTRAVENOUS; SUBCUTANEOUS
Status: DISCONTINUED | OUTPATIENT
Start: 2023-10-30 | End: 2023-10-30

## 2023-10-30 RX ORDER — SODIUM CHLORIDE 0.9 % (FLUSH) 0.9 %
3 SYRINGE (ML) INJECTION
Status: DISCONTINUED | OUTPATIENT
Start: 2023-10-30 | End: 2023-10-30 | Stop reason: HOSPADM

## 2023-10-30 RX ORDER — HYDROMORPHONE HYDROCHLORIDE 2 MG/ML
0.4 INJECTION, SOLUTION INTRAMUSCULAR; INTRAVENOUS; SUBCUTANEOUS EVERY 5 MIN PRN
Status: DISCONTINUED | OUTPATIENT
Start: 2023-10-30 | End: 2023-10-30 | Stop reason: HOSPADM

## 2023-10-30 RX ORDER — FENTANYL CITRATE 50 UG/ML
INJECTION, SOLUTION INTRAMUSCULAR; INTRAVENOUS
Status: DISCONTINUED | OUTPATIENT
Start: 2023-10-30 | End: 2023-10-30

## 2023-10-30 RX ORDER — MIDAZOLAM HYDROCHLORIDE 1 MG/ML
1 INJECTION INTRAMUSCULAR; INTRAVENOUS ONCE
Status: DISCONTINUED | OUTPATIENT
Start: 2023-10-30 | End: 2023-10-30 | Stop reason: HOSPADM

## 2023-10-30 RX ORDER — NITROFURANTOIN 25; 75 MG/1; MG/1
100 CAPSULE ORAL DAILY
Qty: 5 CAPSULE | Refills: 0 | Status: SHIPPED | OUTPATIENT
Start: 2023-10-30 | End: 2023-11-04

## 2023-10-30 RX ORDER — ONDANSETRON 2 MG/ML
4 INJECTION INTRAMUSCULAR; INTRAVENOUS EVERY 4 HOURS PRN
Status: CANCELLED | OUTPATIENT
Start: 2023-10-30

## 2023-10-30 RX ORDER — MIDAZOLAM HYDROCHLORIDE 1 MG/ML
INJECTION INTRAMUSCULAR; INTRAVENOUS
Status: DISCONTINUED | OUTPATIENT
Start: 2023-10-30 | End: 2023-10-30

## 2023-10-30 RX ORDER — ALBUMIN HUMAN 50 G/1000ML
SOLUTION INTRAVENOUS
Status: DISCONTINUED | OUTPATIENT
Start: 2023-10-30 | End: 2023-10-30

## 2023-10-30 RX ORDER — MEPERIDINE HYDROCHLORIDE 25 MG/ML
12.5 INJECTION INTRAMUSCULAR; INTRAVENOUS; SUBCUTANEOUS ONCE AS NEEDED
Status: DISCONTINUED | OUTPATIENT
Start: 2023-10-30 | End: 2023-10-30 | Stop reason: HOSPADM

## 2023-10-30 RX ORDER — DEXMEDETOMIDINE HYDROCHLORIDE 100 UG/ML
INJECTION, SOLUTION INTRAVENOUS
Status: DISCONTINUED | OUTPATIENT
Start: 2023-10-30 | End: 2023-10-30

## 2023-10-30 RX ORDER — PROCHLORPERAZINE EDISYLATE 5 MG/ML
5 INJECTION INTRAMUSCULAR; INTRAVENOUS EVERY 30 MIN PRN
Status: DISCONTINUED | OUTPATIENT
Start: 2023-10-30 | End: 2023-10-30 | Stop reason: HOSPADM

## 2023-10-30 RX ORDER — OXYCODONE HYDROCHLORIDE 5 MG/1
5 TABLET ORAL
Status: DISCONTINUED | OUTPATIENT
Start: 2023-10-30 | End: 2023-10-30 | Stop reason: HOSPADM

## 2023-10-30 RX ORDER — ONDANSETRON 2 MG/ML
INJECTION INTRAMUSCULAR; INTRAVENOUS
Status: DISCONTINUED | OUTPATIENT
Start: 2023-10-30 | End: 2023-10-30

## 2023-10-30 RX ORDER — PROPOFOL 10 MG/ML
VIAL (ML) INTRAVENOUS
Status: DISCONTINUED | OUTPATIENT
Start: 2023-10-30 | End: 2023-10-30

## 2023-10-30 RX ORDER — SODIUM CHLORIDE 9 MG/ML
INJECTION, SOLUTION INTRAVENOUS CONTINUOUS
Status: DISCONTINUED | OUTPATIENT
Start: 2023-10-30 | End: 2023-10-30 | Stop reason: HOSPADM

## 2023-10-30 RX ORDER — OXYCODONE HYDROCHLORIDE 5 MG/1
5 TABLET ORAL EVERY 4 HOURS PRN
Status: CANCELLED | OUTPATIENT
Start: 2023-10-30

## 2023-10-30 RX ORDER — KETOROLAC TROMETHAMINE 30 MG/ML
INJECTION, SOLUTION INTRAMUSCULAR; INTRAVENOUS
Status: DISCONTINUED | OUTPATIENT
Start: 2023-10-30 | End: 2023-10-30

## 2023-10-30 RX ORDER — ACETAMINOPHEN 500 MG
1000 TABLET ORAL EVERY 6 HOURS PRN
Status: CANCELLED | OUTPATIENT
Start: 2023-10-30

## 2023-10-30 RX ORDER — PHENAZOPYRIDINE HYDROCHLORIDE 100 MG/1
100 TABLET, FILM COATED ORAL 3 TIMES DAILY PRN
Qty: 10 TABLET | Refills: 0 | Status: SHIPPED | OUTPATIENT
Start: 2023-10-30 | End: 2023-12-11

## 2023-10-30 RX ORDER — ROCURONIUM BROMIDE 10 MG/ML
INJECTION, SOLUTION INTRAVENOUS
Status: DISCONTINUED | OUTPATIENT
Start: 2023-10-30 | End: 2023-10-30

## 2023-10-30 RX ADMIN — ALBUMIN (HUMAN) 500 ML: 12.5 SOLUTION INTRAVENOUS at 10:10

## 2023-10-30 RX ADMIN — DEXAMETHASONE SODIUM PHOSPHATE 8 MG: 4 INJECTION, SOLUTION INTRAMUSCULAR; INTRAVENOUS at 11:10

## 2023-10-30 RX ADMIN — MIDAZOLAM HYDROCHLORIDE 2 MG: 1 INJECTION, SOLUTION INTRAMUSCULAR; INTRAVENOUS at 09:10

## 2023-10-30 RX ADMIN — CEFAZOLIN 3 G: 2 INJECTION, POWDER, FOR SOLUTION INTRAMUSCULAR; INTRAVENOUS at 09:10

## 2023-10-30 RX ADMIN — ROCURONIUM BROMIDE 20 MG: 10 SOLUTION INTRAVENOUS at 11:10

## 2023-10-30 RX ADMIN — DEXMEDETOMIDINE HYDROCHLORIDE 8 MCG: 100 INJECTION, SOLUTION, CONCENTRATE INTRAVENOUS at 10:10

## 2023-10-30 RX ADMIN — ROCURONIUM BROMIDE 10 MG: 10 SOLUTION INTRAVENOUS at 10:10

## 2023-10-30 RX ADMIN — FAMOTIDINE 20 MG: 20 TABLET ORAL at 08:10

## 2023-10-30 RX ADMIN — SODIUM CHLORIDE, SODIUM LACTATE, POTASSIUM CHLORIDE, AND CALCIUM CHLORIDE: 600; 310; 30; 20 INJECTION, SOLUTION INTRAVENOUS at 08:10

## 2023-10-30 RX ADMIN — ROCURONIUM BROMIDE 50 MG: 10 SOLUTION INTRAVENOUS at 09:10

## 2023-10-30 RX ADMIN — EPHEDRINE SULFATE 5 MG: 50 INJECTION INTRAVENOUS at 11:10

## 2023-10-30 RX ADMIN — OXYCODONE HYDROCHLORIDE 5 MG: 5 TABLET ORAL at 12:10

## 2023-10-30 RX ADMIN — SUGAMMADEX 200 MG: 100 INJECTION, SOLUTION INTRAVENOUS at 12:10

## 2023-10-30 RX ADMIN — HYDROMORPHONE HYDROCHLORIDE 0.5 MG: 2 INJECTION, SOLUTION INTRAMUSCULAR; INTRAVENOUS; SUBCUTANEOUS at 12:10

## 2023-10-30 RX ADMIN — LIDOCAINE HYDROCHLORIDE 100 MG: 20 INJECTION, SOLUTION INTRAVENOUS at 09:10

## 2023-10-30 RX ADMIN — ONDANSETRON HYDROCHLORIDE 4 MG: 2 INJECTION INTRAMUSCULAR; INTRAVENOUS at 12:10

## 2023-10-30 RX ADMIN — KETOROLAC TROMETHAMINE 30 MG: 30 INJECTION, SOLUTION INTRAMUSCULAR; INTRAVENOUS at 12:10

## 2023-10-30 RX ADMIN — HYDROMORPHONE HYDROCHLORIDE 0.6 MG: 2 INJECTION, SOLUTION INTRAMUSCULAR; INTRAVENOUS; SUBCUTANEOUS at 12:10

## 2023-10-30 RX ADMIN — DIPHENHYDRAMINE HYDROCHLORIDE 25 MG: 50 INJECTION, SOLUTION INTRAMUSCULAR; INTRAVENOUS at 10:10

## 2023-10-30 RX ADMIN — EPHEDRINE SULFATE 10 MG: 50 INJECTION INTRAVENOUS at 10:10

## 2023-10-30 RX ADMIN — PROPOFOL 200 MG: 10 INJECTION, EMULSION INTRAVENOUS at 09:10

## 2023-10-30 RX ADMIN — HYDROMORPHONE HYDROCHLORIDE 0.4 MG: 2 INJECTION, SOLUTION INTRAMUSCULAR; INTRAVENOUS; SUBCUTANEOUS at 12:10

## 2023-10-30 RX ADMIN — ROCURONIUM BROMIDE 10 MG: 10 SOLUTION INTRAVENOUS at 11:10

## 2023-10-30 RX ADMIN — FENTANYL CITRATE 100 MCG: 50 INJECTION, SOLUTION INTRAMUSCULAR; INTRAVENOUS at 09:10

## 2023-10-30 RX ADMIN — ACETAMINOPHEN 1000 MG: 500 TABLET ORAL at 08:10

## 2023-10-30 NOTE — INTERVAL H&P NOTE
The patient has been examined and the H&P has been reviewed:    I concur with the findings and no changes have occurred since H&P was written.    Anesthesia/Surgery risks, benefits and alternative options discussed and understood by patient/family.    Nell Sanches is 53 y.o.  presenting for scheduled RATLH for management of AUB-L.    Temp:  [98.4 °F (36.9 °C)] 98.4 °F (36.9 °C)  Pulse:  [86] 86  Resp:  [18] 18  SpO2:  [100 %] 100 %  BP: (136)/(76) 136/76    General: NAD, alert, oriented, cooperative  HEENT: NCAT, EOM grossly intact  Lungs: Normal WOB  Heart: regular rate  Abdomen: soft, nondistended, nontender, no rebound or guarding    Consents in chart. Pre-operative heparin not indicated. All questions answered and concerns addressed. To OR for planned procedure.        Debi Sinha MD   PGY-4, OB-GYN          There are no hospital problems to display for this patient.

## 2023-10-30 NOTE — DISCHARGE SUMMARY
[facility]  Brief Op Note/Discharge Note  Short Stay    Admit Date: 10/30/2023    Discharge Date: 10/30/2023    Attending Physician: Paz Sepulveda IV, MD     Surgery Date: 10/30/2023     Surgeon(s) and Role:     * Paz Sepulveda IV, MD - Primary    Assisting Surgeon: Debi Sinha MD - Resident    Pre-op Diagnosis:  Bulky or enlarged uterus [N85.2]  Fibroids [D21.9]  Breakthrough bleeding with IUD [N92.1, Z97.5]    Post-op Diagnosis:  Post-Op Diagnosis Codes:     * Bulky or enlarged uterus [N85.2]     * Fibroids [D21.9]     * Breakthrough bleeding with IUD [N92.1, Z97.5]    Procedure(s) (LRB):  XI ROBOTIC HYSTERECTOMY,WITH SALPINGECTOMY (Bilateral)  URETEROLYSIS (Right)  ROBOTIC resection ENDOMETRIOSIS (N/A)    Anesthesia: General    Findings/Key Components: See Op note for full details.    Estimated Blood Loss: 20cc          Specimens:   Specimen (24h ago, onward)       Start     Ordered    10/30/23 1207  Specimen to Pathology, Surgery Gynecology and Obstetrics  Once        Comments: Pre-op Diagnosis: Bulky or enlarged uterus [N85.2]Fibroids [D21.9]Breakthrough bleeding with IUD [N92.1, Z97.5]Procedure(s):XI ROBOTIC HYSTERECTOMY,WITH SALPINGECTOMYURETEROLYSIS Number of specimens: 3Name of specimens: 1) Left Uterosacral Endometriosis2) Right Pelvis Side Wall Endometriosis3) Uterus, Cervix, and Bilateral Fallopian Tubes     References:    Click here for ordering Quick Tip   Question Answer Comment   Procedure Type: Gynecology and Obstetrics    Specimen Class: Routine/Screening    Which provider would you like to cc? PAZ SEPULVEDA IV    Release to patient Immediate        10/30/23 1207    10/30/23 1112  Specimen to Pathology, Surgery Gynecology and Obstetrics  Once,   Status:  Canceled        Comments: Pre-op Diagnosis: Bulky or enlarged uterus [N85.2]Fibroids [D21.9]Breakthrough bleeding with IUD [N92.1, Z97.5]Procedure(s):XI ROBOTIC HYSTERECTOMY,WITH SALPINGECTOMYURETEROLYSIS Number of  specimens: 3Name of specimens: 1) Left Uterosacral Endometriosis2) Right Pelvis Side Wall3) Uterus, Cervix, and Bilateral Fallopian Tubes     References:    Click here for ordering Quick Tip   Question Answer Comment   Procedure Type: Gynecology and Obstetrics    Specimen Class: Routine/Screening    Which provider would you like to cc? PAZ ALAN IV    Release to patient Immediate        10/30/23 1158                    Discharge Provider: Debi Sinha    Diagnoses:  Active Hospital Problems    Diagnosis  POA    *Hx of RA-TLH [Z90.710]  No      Resolved Hospital Problems   No resolved problems to display.       Discharged Condition: good    Hospital Course:   Patient was admitted for outpatient procedure as above, and tolerated the procedure well with no complications. Please see operative report for further details. Following the procedure, the patient was awakened from anesthesia and transferred to the recovery area in stable condition. She was discharged to home once ambulating, voiding, tolerating PO intake, and pain was well-controlled. Patient was given routine post-op instructions and prescriptions for pain medication to take as needed. Patient unable to void after active void trial and d/c home with miller in place, Rx for PRN pyridium and macrobid sent to pharmacy. Patient instructed to follow up with Dr. Paz Alan IV in 6 weeks.    Final Diagnoses: Same as principal problem.    Disposition: Home or Self Care    Follow up/Patient Instructions:    Medications:  Reconciled Home Medications:      Medication List        START taking these medications      acetaminophen 650 MG Tbsr  Commonly known as: TYLENOL  Take 1 tablet (650 mg total) by mouth every 6 (six) hours. Alternate between ibuprofen and tylenol every 3 hours. For example: @0800: ibuprofen 600mg @1100: tylenol 650mg @1400: ibuprofen 600mg @1700: tylenol 650 mg @2000: ibuprofen 600mg     ibuprofen 600 MG tablet  Commonly known as:  ADVIL,MOTRIN  Take 1 tablet (600 mg total) by mouth every 6 (six) hours. Alternate between ibuprofen and tylenol every 3 hours. For example: @0800: ibuprofen 600mg @1100: tylenol 650mg @1400: ibuprofen 600mg @1700: tylenol 650 mg @2000: ibuprofen 600mg     nitrofurantoin (macrocrystal-monohydrate) 100 MG capsule  Commonly known as: MACROBID  Take 1 capsule (100 mg total) by mouth once daily. for 5 days     oxyCODONE 5 MG immediate release tablet  Commonly known as: ROXICODONE  Take 1 tablet (5 mg total) by mouth every 4 (four) hours as needed for Pain.     phenazopyridine 100 MG tablet  Commonly known as: PYRIDIUM  Take 1 tablet (100 mg total) by mouth 3 (three) times daily as needed for Pain.            CONTINUE taking these medications      cephALEXin 500 MG capsule  Commonly known as: KEFLEX  SMARTSI Capsule(s) By Mouth     fluticasone propionate 50 mcg/actuation nasal spray  Commonly known as: FLONASE  2 sprays (100 mcg total) by Each Nostril route once daily.     glucosamine-chondroitin 500-400 mg tablet  Take 1 tablet by mouth 3 (three) times daily.     losartan 50 MG tablet  Commonly known as: COZAAR  TAKE 1 TABLET(50 MG) BY MOUTH EVERY DAY     MIRENA 21 mcg/24 hours (8 yrs) 52 mg IUD  Generic drug: levonorgestreL  1 ea by intrauterine administration once     multivitamin capsule  1 cap(s)     norethindrone 5 mg Tab  Commonly known as: AYGESTIN  Take 5 mg by mouth.     OZEMPIC 1 mg/dose (4 mg/3 mL)  Generic drug: semaglutide  Inject 1 mg into the skin every 7 days.     SOFT CHEWS CALCIUM ORAL  Take by mouth.     triamterene-hydrochlorothiazide 37.5-25 mg 37.5-25 mg per tablet  Commonly known as: MAXZIDE-25  TAKE 1 TABLET BY MOUTH EVERY DAY     TUMERIC-GING-OLIVE-OREG-CAPRYL ORAL  Take by mouth.     UBIQUINOL (BULK) MISC  by Misc.(Non-Drug; Combo Route) route.     VITAMIN D-3 ORAL  Take by mouth Daily.            Discharge Procedure Orders   Diet general     Lifting restrictions   Order Comments: No lifting  greater than 15 pounds for six weeks.     Other restrictions (specify):   Order Comments: PELVIC REST:  No douching, tampons, or intercourse for 6 weeks.    If prescribed, vaginal estrogen cream may be used during the postoperative period.     DRIVING:  No driving while on narcotics. Driving may be resumed initially with a competent passenger one to two weeks after surgery if no longer taking narcotics.     EXERCISE:  For six weeks your exercise should be limited to walking. You may walk as far as you wish, as long as you increase your level of exertion gradually and avoid slippery surfaces. You may climb stairs as needed to get around, but should not use stair climbing for exercise.     Remove dressing in 24 hours   Order Comments: If you have a bandage on wound, you may remove it the day after dismissal.  If you had steri-strips remove them once they begin to peel off (usually 2 weeks). Keep incision clean and dry.  Inspect the incision daily for signs and symptoms of infection.     Wound care routine (specify)   Order Comments: WOUND CARE:  If you have a band-aid or bandage on your wound, you may remove it the day after dismissal.  If you had steri-strips remove them once they begin to peel off (usually 2 weeks).  If your steri-strips still haven't come off in 2 weeks, please remove them. You may wash the wound with mild soap and water.   You may shower at any time but should avoid immersing any abdominal incisions in water for at least two weeks after surgery or until the wound is completely healed. If given, please shower with Hibiclens soap until bottle is completely finished. Keep your wound clean and dry.  You should observe your incision for signs of infection which include redness, warmth, drainage or fever.     Call MD for:  temperature >100.4     Call MD for:  persistent nausea and vomiting     Call MD for:  severe uncontrolled pain     Call MD for:  difficulty breathing, headache or visual disturbances      Call MD for:  redness, tenderness, or signs of infection (pain, swelling, redness, odor or green/yellow discharge around incision site)     Call MD for:  hives     Call MD for:   Order Comments: inability to void,urine is ketchup colored or you have large clots, vaginal bleeding is heavier than a period.    VAGINAL DISCHARGE: You may develop a vaginal discharge and intermittent vaginal spotting after surgery and up to 6 weeks postoperatively.  The discharge may have an odor and may change in color but it is normal.  This is due to dissolving stiches.  Contact your surgical team if you develop vaginal or vulvar irritation along with a discharge.  Also contact your surgical team if you have vaginal discharge that smells like urine or stool.    CONSTIPATION REMEDIES: Patients are often constipated after surgery or with use of oral narcotic medicine. You should continue to take the stool softener, Senokot-S during the next six weeks, and consume adequate amounts of water.  If you have not had a bowel movement for 3 days after dismissal, or are uncomfortable and unable to pass stool, please try one or all of the following measures:  1.  Milk of Magnesia - 30 cc by mouth every 12 hours   2.  Dulcolax suppository - One suppository per rectum every 4-6 hours   3.  Metamucil, Fibercon or other bulk former - use as directed  4.  Fleets Enema        PAIN MEDICATIONS:     Take your pain medications as instructed. It is best to take pain medications before your pain becomes severe. This will allow you to take less medication yet have better pain relief. For the first 2 or 3 days it may be helpful to take your pain medications on a regular schedule (e.g. every 4 to 6 hours). This will help you to keep your pain under better control. You should then begin to take fewer medications each day until you no longer need them. Do not take pain medication on an empty stomach. This may lead to nausea and vomiting.     Activity as  tolerated   Order Comments: Return to normal activity slowly as you feel able.  For 6 weeks your exercise should be limited to walking.  You may walk as far as you wish, as long as you increase your level of exertion gradually and avoid slippery surfaces.    If you had a hysterectomy at the surgery do not insert anything in your vagina for 9 weeks.     Shower on day dressing removed (No bath)   Order Comments: You may shower at any time but should avoid immersing any abdominal incisions in water for at least 2 weeks after surgery or until the wound is completely healed.  If given, please shower with Hibaclens soap until bottle is completely finish      Follow-up Information       Jamey Sepulveda IV, MD Follow up.    Specialties: Obstetrics, Obstetrics and Gynecology  Why: post-op follow up  Contact information:  [address] [address] [phone]                               Debi Sinha MD   PGY-4, OB-GYN

## 2023-10-30 NOTE — PLAN OF CARE
Active voiding trial performed:  after draining bladder to  bag, catheter disconnected from  tubing; 60cc syringe inserted into catheter; 300cc's sterile water instilled via gravity, catheter balloon then deflated with 10cc syringe and catheter D/C'ed with catheter tip intact.Patient assisted up to bathroom to void.

## 2023-10-30 NOTE — DISCHARGE INSTRUCTIONS
An indwelling urinary catheter is a flexible plastic tube that is inserted through the opening that carries urine from the  bladder to the outside of the body (urethra), to drain urine. The tube is kept in place by a small balloon that is inflated  once the tube is securely in the bladder. Urine drains into a bag that is attached to the thigh through this catheter.      To care for your urinary catheter at home:     Make sure that urine is flowing out of the catheter into the drainage bag.   Check the area around the insertion site for signs of infections (such as inflammation and irritated/swollen/  red/tender skin), and/or leakage of urine around the catheter   Keep the urinary drainage bag below the level of the bladder (to prevent backflow into the bladder).   Make sure that the urinary drainage bag does not drag and pull on the catheter.    Caring for your catheter:     Clean the area around the drainage tube twice each day.   Use a mild soap and water around the drainage tube.   Rinse well and dry with a clean towel.      Draining the urine collection bag:    The bag that collects urine may be strapped to your thigh. You will need to empty the bag at regular intervals,  typically every 2 to 4 hours, or whenever the catheter bag is half-full, and at bedtime.   Wash your hands with soap and water. If you are emptying another persons catheter bag, you may wish to wear  disposable gloves. Wash your hands before you put the gloves on and after removing them. An indwelling urinary catheter is a flexible plastic tube that is inserted through the opening that carries urine from the  bladder to the outside of the body (urethra), to drain urine. The tube is kept in place by a small balloon that is inflated  once the tube is securely in the bladder. Urine drains into a bag that is attached to the thigh through this catheter.      To care for your urinary catheter at home:     Make sure that urine is flowing out of the  catheter into the drainage bag.   Check the area around the insertion site for signs of infections (such as inflammation and irritated/swollen/  red/tender skin), and/or leakage of urine around the catheter   Keep the urinary drainage bag below the level of the bladder (to prevent backflow into the bladder).   Make sure that the urinary drainage bag does not drag and pull on the catheter.    Caring for your catheter:     Clean the area around the drainage tube twice each day.   Use a mild soap and water around the drainage tube.   Rinse well and dry with a clean towel.      Draining the urine collection bag:    The bag that collects urine may be strapped to your thigh. You will need to empty the bag at regular intervals,  typically every 2 to 4 hours, or whenever the catheter bag is half-full, and at bedtime.   Wash your hands with soap and water. If you are emptying another persons catheter bag, you may wish to wear  disposable gloves. Wash your hands before you put the gloves on and after removing them.

## 2023-10-30 NOTE — OPERATIVE NOTE ADDENDUM
Certification of Assistant at Surgery       Surgery Date: 10/30/2023     Participating Surgeons:  Surgeon(s) and Role:     * Jamey Sepulveda IV, MD - Primary    Procedures:  Procedure(s) (LRB):  XI ROBOTIC HYSTERECTOMY,WITH SALPINGECTOMY (Bilateral)  URETEROLYSIS (Right)  ROBOTIC resection ENDOMETRIOSIS (N/A)    Assistant Surgeon's Certification of Necessity:  I understand that section 1842 (b) (6) (d) of the Social Security Act generally prohibits Medicare Part B reasonable charge payment for the services of assistants at surgery in teaching hospitals when qualified residents are available to furnish such services. I certify that the services for which payment is claimed were medically necessary, and that no qualified resident was available to perform the services. I further understand that these services are subject to post-payment review by the Medicare carrier.      VELVET Betancourt    10/30/2023  1:00 PM

## 2023-10-30 NOTE — ANESTHESIA PROCEDURE NOTES
Intubation    Date/Time: 10/30/2023 9:43 AM    Performed by: Jayla Li CRNA  Authorized by: Garret Fong MD    Intubation:     Induction:  Inhalational - mask    Intubated:  Postinduction    Mask Ventilation:  Easy mask    Attempts:  1    Attempted By:  CRNA    Method of Intubation:  Video laryngoscopy    Blade:  Gutierrez 3    Laryngeal View Grade: Grade I - full view of cords      Difficult Airway Encountered?: No      Complications:  None    Airway Device:  Oral endotracheal tube    Airway Device Size:  8.0    Style/Cuff Inflation:  Cuffed    Inflation Amount (mL):  5    Tube secured:  22    Secured at:  The lips    Placement Verified By:  Capnometry    Complicating Factors:  None    Findings Post-Intubation:  BS equal bilateral

## 2023-10-30 NOTE — INTERVAL H&P NOTE
The patient has been examined and the H&P has been reviewed:    I concur with the findings and no changes have occurred since H&P was written.    Surgery risks, benefits and alternative options discussed and understood by patient/family.          There are no hospital problems to display for this patient.      Jamey Sepulveda IV, MD

## 2023-10-30 NOTE — OP NOTE
[facility] Surgery ([facility]  Surgery Department  Operative Note    SUMMARY     Date of Procedure: 10/30/2023     Procedure: Procedure(s) (LRB):  XI ROBOTIC HYSTERECTOMY,WITH SALPINGECTOMY (Bilateral)  URETEROLYSIS (Right)  ROBOTIC resection ENDOMETRIOSIS (N/A)     Surgeon(s) and Role:     * Jamey Sepulveda IV, MD - Primary  FARIBA Rondon (no resident or staff available to assist at bedside)  Debi Sinha, PGY 4 (assist at console)      Pre-Operative Diagnosis: Bulky or enlarged uterus [N85.2]  Fibroids [D21.9]  Breakthrough bleeding with IUD [N92.1, Z97.5]    Post-Operative Diagnosis: Post-Op Diagnosis Codes:     * Bulky or enlarged uterus [N85.2]     * Fibroids [D21.9]     * Breakthrough bleeding with IUD [N92.1, Z97.5]    Anesthesia: General endotracheal anesthesia    Technical Procedures Used:   1. Robotic assisted hysterectomy with bilateral salpingectomy  2. Robotic assisted resection of endometriosis  3. Robotic assisted right ureterolysis     Description of the Findings of the Procedure:   1. Uterus 14 week size with multiple fibroids.  Bilateral ovaries appeared normal.  Bilateral fallopian tubes appeared normal.  2. Stage II endometriosis with lesions along left uterosacral ligament and right pelvic sidewall peritoneum.  3. Liver edge, cardiac window, and appendix with no visualized abnormalities noted.  4. Bilateral ureters with normal, forward vermiculation throughout and postprocedure.  5. Post hysterectomy, uterus placed in a large [employer] bag and extracted vaginally using the EXCITE technique.  6. Vaginal cuff with excellent closure-confirmed with speculum exam postprocedure    Operative report:  Nell Sanches is a 53-year-old patient suffering from the above conditions.  Risks, benefits, and alternatives to minimally invasive hysterectomy with conservation of ovaries reviewed in detail with the patient during her preoperative visit.  Consents were signed patient was taken  to the operating room today where she underwent successful general endotracheal anesthesia.  Patient was prepped and draped in the usual sterile fashion for abdominal/pelvic surgery.  Sterile placement of Pruitt catheter was performed.  A HAWA uterine manipulator with a KOH ring was placed.  Post HAWA placement attention was turned to the abdomen where Veress needle was placed through the umbilicus.  Correct placement was confirmed with the hanging drop test and low intra-abdominal pressures.  The abdomen pelvis were insufflated with CO2 gas.  Post insufflation robotic ports were placed.  An 8 mm umbilical incision was made with scalpel.  An 8 mm camera port was placed without complication.  Two 8 mm lateral abdominal quadrant operative ports were placed under direct visualization for total of 3 ports being used for this procedure.  The patient was placed in deep Trendelenburg and the robot was docked.  General inspection was performed with the findings as reported above.    Attention was turned to the hysterectomy portion of the procedure.  Bilateral round ligaments were identified cauterized and transected.  Bilateral retroperitoneal spaces were dissected.  Endometriosis was noted as reported above.  Anterior leaf of the broad ligament was transected in order to create a bladder flap the bladder was dissected to a point below the external os of the cervix identified by the KOH ring of the HAWA uterine manipulator.  Posterior medial leaves of the broad ligament were opened and extended parallel to the infundibulopelvic ligaments bilaterally.  The bilateral utero-ovarian ligaments were cauterized and transected.  The bilateral fallopian tubes were transected off of the respective mesosalpinges.  The bilateral uterine vessels were skeletonized and cauterized at the level of the KOH ring.  The left uterosacral ligament was noted to have an endometriosis lesion which was resected with surrounding peritoneum.  The right  pelvic sidewall had multiple endometriosis lesions and the right pelvic sidewall peritoneum was transected taking care to avoid the underlying structures.  In order to remove the right pelvic sidewall peritoneum, right ureterolysis was required in order perform this portion of procedure safely.  Right ureterolysis was performed without complication.  Anterior colpotomy was made to 12 o'clock position worked towards the 3 and 9 o'clock positions along the KOH ring.  Posterior colpotomy was made at the 6 o'clock position and worked towards the 3 and 9 o'clock positions along the KOH ring.  Bilateral uterine vessels cauterized/sealed at the level of the KOH ring and transected with excellent hemostasis being noted.  Colpotomy was completed at the 3 and 9 o'clock position along the KOH ring.  This uterus was too large to be removed through the vaginal cuff opening.  A large [employer] bag was placed through the vaginal cuff opening and the specimen was placed in this bag.  The bag was pulled through the vaginal cuff and vaginal introitus.  Using the EXCITE technique with a 10 blade, the uterus was extracted without complication.  The entire specimen will be sent to pathology for analysis.  This is a completely contained tissue extraction with no evidence of bag breach post removal of specimen.  Vaginal cuff was sutured robotically using 0 Vicryl suture and 0 V lock 180 suture.  0 Vicryl suture was placed in figure-of-eight fashion at the angles.  0 V lock 180 suture was started at the right angle and worked towards the left angle and then back towards the middle of the vaginal cuff.  Five simple interrupted sutures of 0 Vicryl were strategically placed along the vaginal cuff for added support.  Excellent vaginal cuff closure was noted both robotically and via speculum exam postprocedure.  Pelvis copiously irrigated and the fluid removed via suction .  Excellent hemostasis was noted from all pedicle sites.   No significant blood loss was noted during the case.  Bilateral ureters were seen to have normal, forward vermiculation throughout and postprocedure.  At this point procedure was terminated.  CO2 gas was released.  Ports were removed.  Skin sites were closed using 4 Monocryl suture subcuticular fashion.  The vagina was inspected postprocedure with a speculum with excellent closure being noted.  Patient was extubated taken to the PACU in good condition.  There were no complications and all counts were correct.    Operative pictures:                      Significant Surgical Tasks Conducted by the Assistant(s), if Applicable:   Assist with port placement  Assist with port sites skin closure with 4-0 Monocryl suture subcuticular fashion    Complications: No    Estimated Blood Loss (EBL): * No values recorded between 10/30/2023  9:52 AM and 10/30/2023 12:49 PM *           Implants: * No implants in log *    Specimens:   Specimen (24h ago, onward)       Start     Ordered    10/30/23 1207  Specimen to Pathology, Surgery Gynecology and Obstetrics  Once        Comments: Pre-op Diagnosis: Bulky or enlarged uterus [N85.2]Fibroids [D21.9]Breakthrough bleeding with IUD [N92.1, Z97.5]Procedure(s):XI ROBOTIC HYSTERECTOMY,WITH SALPINGECTOMYURETEROLYSIS Number of specimens: 3Name of specimens: 1) Left Uterosacral Endometriosis2) Right Pelvis Side Wall Endometriosis3) Uterus, Cervix, and Bilateral Fallopian Tubes     References:    Click here for ordering Quick Tip   Question Answer Comment   Procedure Type: Gynecology and Obstetrics    Specimen Class: Routine/Screening    Which provider would you like to cc? PAZ ALAN IV    Release to patient Immediate        10/30/23 1207    10/30/23 1112  Specimen to Pathology, Surgery Gynecology and Obstetrics  Once,   Status:  Canceled        Comments: Pre-op Diagnosis: Bulky or enlarged uterus [N85.2]Fibroids [D21.9]Breakthrough bleeding with IUD [N92.1, Z97.5]Procedure(s):XI ROBOTIC  HYSTERECTOMY,WITH SALPINGECTOMYURETEROLYSIS Number of specimens: 3Name of specimens: 1) Left Uterosacral Endometriosis2) Right Pelvis Side Wall3) Uterus, Cervix, and Bilateral Fallopian Tubes     References:    Click here for ordering Quick Tip   Question Answer Comment   Procedure Type: Gynecology and Obstetrics    Specimen Class: Routine/Screening    Which provider would you like to cc? PAZ SEPULVEDA IV    Release to patient Immediate        10/30/23 4983                            Condition: Good    Disposition: PACU - hemodynamically stable.    Attestation:  I was present and performed this entire procedure.    Paz Sepulveda IV, MD

## 2023-10-30 NOTE — PLAN OF CARE
Portable KUB completed; patient has made 2 more attempts to void and unsuccessful; denies urge to void; bedside bladder scan done measuring 18-28 cc's.  Resident notified of outcome of voiding trial

## 2023-10-30 NOTE — PLAN OF CARE
Patient unable to void.  Dr. Sepulveda at bedside to review results.  Patient now up in hallways walking.

## 2023-10-30 NOTE — PLAN OF CARE
#16 Fr miller catheter inserted without difficulty;clear light yellow urine returned; 5cc balloon inflated and secured with existing stat-lock; 300cc's urine measured.  Catheter care demonstrated to patient; staff to call patient when to return to office for catheter removal.

## 2023-10-30 NOTE — PLAN OF CARE
Nell Sanches has met all discharge criteria from Phase II. Vital Signs are stable, ambulating  without difficulty. Discharge instructions given, patient verbalized understanding. Discharged from facility via wheelchair in stable condition.

## 2023-10-30 NOTE — TRANSFER OF CARE
Anesthesia Transfer of Care Note    Patient: Nell Sanches    Procedure(s) Performed: Procedure(s) (LRB):  XI ROBOTIC HYSTERECTOMY,WITH SALPINGECTOMY (Bilateral)  URETEROLYSIS (Right)    Patient location: PACU    Anesthesia Type: general    Transport from OR: Transported from OR on 6-10 L/min O2 by face mask with adequate spontaneous ventilation    Post pain: adequate analgesia    Post assessment: no apparent anesthetic complications and tolerated procedure well    Post vital signs: stable    Level of consciousness: awake    Nausea/Vomiting: no nausea/vomiting    Complications: none    Transfer of care protocol was followed      Last vitals:   Visit Vitals  /62 (BP Location: Left arm, Patient Position: Lying)   Pulse 95   Temp 36.2 °C (97.1 °F) (Temporal)   Resp 16   SpO2 100%   Breastfeeding No

## 2023-10-30 NOTE — PLAN OF CARE
Resident notified Dr. Sepulveda; patient has choice of going home with miller catheter, or have catheter inserted, wait 6 hours and repeat voiding trial.

## 2023-10-30 NOTE — ANESTHESIA POSTPROCEDURE EVALUATION
Anesthesia Post Evaluation    Patient: Nell Sanches    Procedure(s) Performed: Procedure(s) (LRB):  XI ROBOTIC HYSTERECTOMY,WITH SALPINGECTOMY (Bilateral)  URETEROLYSIS (Right)  ROBOTIC resection ENDOMETRIOSIS (N/A)    Final Anesthesia Type: general      Patient location during evaluation: PACU  Patient participation: Yes- Able to Participate  Level of consciousness: awake and alert  Post-procedure vital signs: reviewed and stable  Pain management: adequate  Airway patency: patent    PONV status at discharge: No PONV  Anesthetic complications: no      Cardiovascular status: blood pressure returned to baseline  Respiratory status: unassisted  Hydration status: euvolemic  Follow-up not needed.          Vitals Value Taken Time   /61 10/30/23 1316   Temp 98.6 10/30/23 1353   Pulse 86 10/30/23 1319   Resp 16 10/30/23 1315   SpO2 100 % 10/30/23 1319   Vitals shown include unvalidated device data.      Event Time   Out of Recovery 13:21:41         Pain/Raúl Score: Pain Rating Prior to Med Admin: 5 (10/30/2023 12:55 PM)  Raúl Score: 9 (10/30/2023  1:00 PM)

## 2023-10-31 ENCOUNTER — TELEPHONE (OUTPATIENT)
Dept: OBSTETRICS AND GYNECOLOGY | Facility: HOSPITAL | Age: 53
End: 2023-10-31
Payer: COMMERCIAL

## 2023-11-01 ENCOUNTER — TELEPHONE (OUTPATIENT)
Dept: OBSTETRICS AND GYNECOLOGY | Facility: CLINIC | Age: 53
End: 2023-11-01
Payer: COMMERCIAL

## 2023-11-01 NOTE — TELEPHONE ENCOUNTER
----- Message from Margo Harrison MD sent at 10/31/2023 10:51 PM CDT -----  Regarding: Miller catheter  Hello,    This patient is s/p RA-TLH/BS (10/30) and called to say her miller catheter came out at home around 9PM 10/31. She said when it came out she was able to urinate some. I instructed her to increase fluid intake and give herself 6-8 hours to urinate again before coming to the [facility] ED. Just wanted to make you aware.    Thank you,    Margo Harrison MD  OB/GYN PGY1

## 2023-11-01 NOTE — TELEPHONE ENCOUNTER
Spoke with pt. Patient states doing well now since catheter came out, she was advised once catheter came out to discontinue macrobid  ,reports passing gas. able to control discomforts at home with medications.  Instructed patient to contact the office with any questions or concerns.     Will call pt tomorrow with post op date after taking to tamar

## 2023-11-01 NOTE — TELEPHONE ENCOUNTER
Patient states catheter came out, She is now urinating on her own, since last night. Patient will not be coming for appointment

## 2023-11-01 NOTE — TELEPHONE ENCOUNTER
----- Message from Patience Owen sent at 11/1/2023  1:11 PM CDT -----  Contact: MONA PITTMAN [[id number]]  Type: Call Back      Who called: MONA PITTMAN [[id number]]      What is the request in detail: Patient is requesting a call back from Faye in regard to her appointment with Kamille Delgado NP she states that she is doing fine and she do not think she need to come in tomorrow.   Please advise.     Can the clinic reply by [facility]? No      Would the patient rather a call back or a response via [facility]? Call back       Best call back number: [phone]      Additional Information:

## 2023-11-01 NOTE — TELEPHONE ENCOUNTER
Patient called to inform that miller catheter came out around 9PM 10/31. Patient is POD1 s/p RA-TLH/BS and failed her AVT requiring her to be discharged home with miller in place.     Patient states that when the miller came out, she had an episode of urination. Patient states miller balloon & blue tip came out but is concerned something was left behind in her bladder. Counseled patient that blue tip & miller balloon being present indicate nothing was left behind.    Counseled patient on increasing PO fluid intake and making sure she is able to urinate another time in the 6-8 hours after the catheter came out. If she is not able to urinate again in 6-8 hours, she was instructed to come to [facility] ED to be evaluated.    Patient voiced understanding, all questions answered.    Margo Harrison MD  OB/GYN PGY1

## 2023-11-02 LAB
FINAL PATHOLOGIC DIAGNOSIS: NORMAL
GROSS: NORMAL
Lab: NORMAL

## 2023-12-11 ENCOUNTER — OFFICE VISIT (OUTPATIENT)
Dept: OBSTETRICS AND GYNECOLOGY | Facility: CLINIC | Age: 53
End: 2023-12-11
Payer: COMMERCIAL

## 2023-12-11 VITALS
SYSTOLIC BLOOD PRESSURE: 122 MMHG | HEIGHT: 72 IN | WEIGHT: 257.69 LBS | DIASTOLIC BLOOD PRESSURE: 74 MMHG | BODY MASS INDEX: 34.9 KG/M2

## 2023-12-11 DIAGNOSIS — Z90.710 HX OF HYSTERECTOMY: ICD-10-CM

## 2023-12-11 DIAGNOSIS — N80.30 PELVIC PERITONEAL ENDOMETRIOSIS: ICD-10-CM

## 2023-12-11 DIAGNOSIS — Z09 SURGERY FOLLOW-UP EXAMINATION: Primary | ICD-10-CM

## 2023-12-11 PROBLEM — N85.2 BULKY OR ENLARGED UTERUS: Status: RESOLVED | Noted: 2023-10-24 | Resolved: 2023-12-11

## 2023-12-11 PROBLEM — D25.1 FIBROIDS, INTRAMURAL: Status: RESOLVED | Noted: 2023-07-13 | Resolved: 2023-12-11

## 2023-12-11 PROCEDURE — 99999 PR PBB SHADOW E&M-EST. PATIENT-LVL III: CPT | Mod: PBBFAC,,, | Performed by: OBSTETRICS & GYNECOLOGY

## 2023-12-11 PROCEDURE — 3074F PR MOST RECENT SYSTOLIC BLOOD PRESSURE < 130 MM HG: ICD-10-PCS | Mod: CPTII,S$GLB,, | Performed by: OBSTETRICS & GYNECOLOGY

## 2023-12-11 PROCEDURE — 3078F DIAST BP <80 MM HG: CPT | Mod: CPTII,S$GLB,, | Performed by: OBSTETRICS & GYNECOLOGY

## 2023-12-11 PROCEDURE — 1159F MED LIST DOCD IN RCRD: CPT | Mod: CPTII,S$GLB,, | Performed by: OBSTETRICS & GYNECOLOGY

## 2023-12-11 PROCEDURE — 99999 PR PBB SHADOW E&M-EST. PATIENT-LVL III: ICD-10-PCS | Mod: PBBFAC,,, | Performed by: OBSTETRICS & GYNECOLOGY

## 2023-12-11 PROCEDURE — 3074F SYST BP LT 130 MM HG: CPT | Mod: CPTII,S$GLB,, | Performed by: OBSTETRICS & GYNECOLOGY

## 2023-12-11 PROCEDURE — 3078F PR MOST RECENT DIASTOLIC BLOOD PRESSURE < 80 MM HG: ICD-10-PCS | Mod: CPTII,S$GLB,, | Performed by: OBSTETRICS & GYNECOLOGY

## 2023-12-11 PROCEDURE — 3044F PR MOST RECENT HEMOGLOBIN A1C LEVEL <7.0%: ICD-10-PCS | Mod: CPTII,S$GLB,, | Performed by: OBSTETRICS & GYNECOLOGY

## 2023-12-11 PROCEDURE — 1159F PR MEDICATION LIST DOCUMENTED IN MEDICAL RECORD: ICD-10-PCS | Mod: CPTII,S$GLB,, | Performed by: OBSTETRICS & GYNECOLOGY

## 2023-12-11 PROCEDURE — 99024 POSTOP FOLLOW-UP VISIT: CPT | Mod: S$GLB,,, | Performed by: OBSTETRICS & GYNECOLOGY

## 2023-12-11 PROCEDURE — 3044F HG A1C LEVEL LT 7.0%: CPT | Mod: CPTII,S$GLB,, | Performed by: OBSTETRICS & GYNECOLOGY

## 2023-12-11 PROCEDURE — 4010F ACE/ARB THERAPY RXD/TAKEN: CPT | Mod: CPTII,S$GLB,, | Performed by: OBSTETRICS & GYNECOLOGY

## 2023-12-11 PROCEDURE — 99024 PR POST-OP FOLLOW-UP VISIT: ICD-10-PCS | Mod: S$GLB,,, | Performed by: OBSTETRICS & GYNECOLOGY

## 2023-12-11 PROCEDURE — 4010F PR ACE/ARB THEARPY RXD/TAKEN: ICD-10-PCS | Mod: CPTII,S$GLB,, | Performed by: OBSTETRICS & GYNECOLOGY

## 2023-12-11 NOTE — PROGRESS NOTES
Postop visit    Nell Sanches is a 53 y.o.   patient status post robotic assisted hysterectomy bilateral salpingectomy/resection of endometriosis on 10/30/2023.  Patient is doing well postoperatively.  No pain.  No bowel or bladder complaints.  No fever.      The pathology report reviewed with patient.  Endometriosis confirmed.    Past Medical History:   Diagnosis Date    Abnormal weight gain 10/02/2012    Achilles tendonitis     left    Arthritis     Digestive disorder     Fibroids     Gastro-esophageal reflux disease without esophagitis     Hypertension     Other and unspecified hyperlipidemia 10/02/2012    Unspecified vitamin D deficiency 10/02/2012     Past Surgical History:   Procedure Laterality Date    BREAST BIOPSY Right 2012     SECTION      CHOLECYSTECTOMY      COLONOSCOPY      GALLBLADDER SURGERY  2015    HIP ARTHROPLASTY Right 10/25/2022    Procedure: ARTHROPLASTY, HIP TOTAL;  Surgeon: Gil Paulino MD;  Location: [facility];  Service: Orthopedics;  Laterality: Right;    HYSTEROSCOPY WITH DILATION AND CURETTAGE OF UTERUS      and iud retrieval    LYSIS OF ADHESIONS OF URETER Right 10/30/2023    Procedure: URETEROLYSIS;  Surgeon: Jamey Sepulveda IV, MD;  Location: [facility];  Service: OB/GYN;  Laterality: Right;    ROBOTIC ABLATION OR EXCISION, ENDOMETRIOSIS N/A 10/30/2023    Procedure: ROBOTIC ABLATION OR EXCISION , ENDOMETRIOSIS;  Surgeon: Jamey Sepulveda IV, MD;  Location: [facility];  Service: OB/GYN;  Laterality: N/A;  resection ENDOMETRIOSIS    XI ROBOTIC HYSTERECTOMY, WITH SALPINGECTOMY Bilateral 10/30/2023    Procedure: XI ROBOTIC HYSTERECTOMY,WITH SALPINGECTOMY;  Surgeon: Jamey Sepulveda IV, MD;  Location: [facility];  Service: OB/GYN;  Laterality: Bilateral;     Family History   Problem Relation Age of Onset    Colon cancer Maternal Grandmother 70     Social History     Tobacco Use    Smoking status: Never    Smokeless tobacco: Never   Substance Use Topics     Alcohol use: Yes     Comment: rare    Drug use: Never     OB History    Para Term  AB Living   1 1 1         SAB IAB Ectopic Multiple Live Births                  # Outcome Date GA Lbr José Luis/2nd Weight Sex Delivery Anes PTL Lv   1 Term                /74   Ht 6' (1.829 m)   Wt 116.9 kg (257 lb 11.5 oz)   LMP  (LMP Unknown)   BMI 34.95 kg/m²     ROS:  GENERAL: No fever, chills, fatigability or weight loss.  VULVAR: No pain, no lesions and no itching.  VAGINAL: No relaxation, no itching, no discharge, no abnormal bleeding and no lesions.  ABDOMEN: No abdominal pain. Denies nausea. Denies vomiting. No diarrhea. No constipation  BREAST: Denies pain. No lumps. No discharge.  URINARY: No incontinence, no nocturia, no frequency and no dysuria.  CARDIOVASCULAR: No chest pain. No shortness of breath. No leg cramps.  NEUROLOGICAL: No headaches. No vision changes.    PE:   APPEARANCE: Well nourished, well developed, in no acute distress.  GENITOURINARY:  Vulva: No lesions. No erythema nor excoriations noted,Normal female genital architecture.  Urethral Meatus: Normal size and location, no lesions, no prolapse.  Urethra: No masses, tenderness, prolapse or scarring.  Vagina:  Moist and with rugae, no discharge, no significant cystocele or rectocele.  Vaginal cuff healing well.  Cervix: Absent  Uterus: Absent  Adnexa: No masses, tenderness or CDS nodularity.  Anus Perineum: No lesions, no relaxation, no external hemorrhoids.  Abdomen: No masses, tenderness, hernia or ascites, no hepatosplenomegaly.  Davinci port sites healed.   Skin: No rashes, lesions, ulcers, acne, hirsutism.  Peripheral/lower extremities: No edema, erythema or tenderness.  Lymphatic: No groin nodes palp.  Mental Status: Alert, oriented x 3, normal affect and mood .        ICD-10-CM ICD-9-CM    1. Surgery follow-up examination  Z09 V67.00       2. Pelvic peritoneal endometriosis  N80.30 617.3       3. Hx of [facility]  Z90.710 V88.01          Plan:  Patient cleared for routine activity/exercise.    Continue pelvic rest x 10 weeks.  Patient verbalized understanding.    Annual exam in one year - Dr. ANGELINA Gustafson    Patient instructed to monitor for menopausal symptoms/hot flashes.    Keep regular PCP follow-up     Jamey Sepulveda IV, MD

## 2023-12-30 LAB
ALBUMIN: 4.3 G/DL (ref 3.4–5)
ALP SERPL-CCNC: 70 U/L (ref 20–120)
ALT SERPL W P-5'-P-CCNC: 32 U/L
ANION GAP SERPL CALC-SCNC: 8 MMOL/L (ref 8–16)
AST SERPL-CCNC: 25 U/L
BILIRUB SERPL-MCNC: 0.7 MG/DL
BUN BLD-MCNC: 18 MG/DL (ref 7–25)
CALCIUM SERPL-MCNC: 9.1 MG/DL (ref 8.4–10.3)
CHLORIDE SERPL-SCNC: 101 MMOL/L (ref 96–110)
CO2 SERPL-SCNC: 29 MMOL/L (ref 24–32)
CREAT SERPL-MCNC: 0.87 MG/DL (ref 0.5–1.1)
EGFR: 79 ML/MIN/1.73M2
GLUCOSE SERPL-MCNC: 80 MG/DL (ref 65–99)
HBA1C MFR BLD: 6 % OF TOTAL HGB
POTASSIUM SERPL-SCNC: 4.1 MMOL/L (ref 3.6–5.2)
SODIUM BLD-SCNC: 138 MMOL/L (ref 135–146)
TOTAL PROTEIN: 7 G/DL (ref 6–8)

## 2024-01-02 NOTE — TELEPHONE ENCOUNTER
No care due was identified.  Health [facility] Embedded Care Due Messages. Reference number: [id number].   1/02/2024 3:16:30 PM CST

## 2024-01-03 RX ORDER — SEMAGLUTIDE 1.34 MG/ML
1 INJECTION, SOLUTION SUBCUTANEOUS
Qty: 9 ML | Refills: 3 | Status: SHIPPED | OUTPATIENT
Start: 2024-01-03

## 2024-03-10 LAB
ALBUMIN SERPL-MCNC: 4.3 G/DL (ref 3.6–5.1)
ALBUMIN/GLOB SERPL: 1.6 (CALC) (ref 1–2.5)
ALP SERPL-CCNC: 59 U/L (ref 37–153)
ALT SERPL-CCNC: 15 U/L (ref 6–29)
AST SERPL-CCNC: 13 U/L (ref 10–35)
BASOPHILS # BLD AUTO: 48 CELLS/UL (ref 0–200)
BASOPHILS NFR BLD AUTO: 0.9 %
BILIRUB SERPL-MCNC: 0.6 MG/DL (ref 0.2–1.2)
BUN SERPL-MCNC: 17 MG/DL (ref 7–25)
BUN/CREAT SERPL: ABNORMAL (CALC) (ref 6–22)
CALCIUM SERPL-MCNC: 9.7 MG/DL (ref 8.6–10.4)
CHLORIDE SERPL-SCNC: 103 MMOL/L (ref 98–110)
CHOLEST SERPL-MCNC: 169 MG/DL
CHOLEST/HDLC SERPL: 3.3 (CALC)
CO2 SERPL-SCNC: 29 MMOL/L (ref 20–32)
CREAT SERPL-MCNC: 0.82 MG/DL (ref 0.5–1.03)
EGFR: 85 ML/MIN/1.73M2
EOSINOPHIL # BLD AUTO: 228 CELLS/UL (ref 15–500)
EOSINOPHIL NFR BLD AUTO: 4.3 %
ERYTHROCYTE [DISTWIDTH] IN BLOOD BY AUTOMATED COUNT: 14.9 % (ref 11–15)
GLOBULIN SER CALC-MCNC: 2.7 G/DL (CALC) (ref 1.9–3.7)
GLUCOSE SERPL-MCNC: 106 MG/DL (ref 65–99)
HBA1C MFR BLD: 5.9 % OF TOTAL HGB
HCT VFR BLD AUTO: 41.5 % (ref 35–45)
HDLC SERPL-MCNC: 52 MG/DL
HGB BLD-MCNC: 13.4 G/DL (ref 11.7–15.5)
LDLC SERPL CALC-MCNC: 100 MG/DL (CALC)
LYMPHOCYTES # BLD AUTO: 1723 CELLS/UL (ref 850–3900)
LYMPHOCYTES NFR BLD AUTO: 32.5 %
MCH RBC QN AUTO: 25.9 PG (ref 27–33)
MCHC RBC AUTO-ENTMCNC: 32.3 G/DL (ref 32–36)
MCV RBC AUTO: 80.3 FL (ref 80–100)
MONOCYTES # BLD AUTO: 408 CELLS/UL (ref 200–950)
MONOCYTES NFR BLD AUTO: 7.7 %
NEUTROPHILS # BLD AUTO: 2894 CELLS/UL (ref 1500–7800)
NEUTROPHILS NFR BLD AUTO: 54.6 %
NONHDLC SERPL-MCNC: 117 MG/DL (CALC)
PLATELET # BLD AUTO: 360 THOUSAND/UL (ref 140–400)
PMV BLD REES-ECKER: 10.9 FL (ref 7.5–12.5)
POTASSIUM SERPL-SCNC: 4.5 MMOL/L (ref 3.5–5.3)
PROT SERPL-MCNC: 7 G/DL (ref 6.1–8.1)
RBC # BLD AUTO: 5.17 MILLION/UL (ref 3.8–5.1)
SODIUM SERPL-SCNC: 139 MMOL/L (ref 135–146)
T4 FREE SERPL-MCNC: 1.2 NG/DL (ref 0.8–1.8)
TRIGL SERPL-MCNC: 82 MG/DL
TSH SERPL-ACNC: 1.81 MIU/L
WBC # BLD AUTO: 5.3 THOUSAND/UL (ref 3.8–10.8)

## 2024-03-22 ENCOUNTER — OFFICE VISIT (OUTPATIENT)
Dept: PRIMARY CARE CLINIC | Facility: CLINIC | Age: 54
End: 2024-03-22
Payer: COMMERCIAL

## 2024-03-22 VITALS
HEIGHT: 72 IN | SYSTOLIC BLOOD PRESSURE: 110 MMHG | DIASTOLIC BLOOD PRESSURE: 80 MMHG | OXYGEN SATURATION: 99 % | WEIGHT: 252 LBS | BODY MASS INDEX: 34.13 KG/M2 | HEART RATE: 78 BPM

## 2024-03-22 DIAGNOSIS — R73.01 IMPAIRED FASTING GLUCOSE: ICD-10-CM

## 2024-03-22 DIAGNOSIS — I10 ESSENTIAL HYPERTENSION: Primary | ICD-10-CM

## 2024-03-22 DIAGNOSIS — I87.2 VENOUS INSUFFICIENCY: ICD-10-CM

## 2024-03-22 DIAGNOSIS — E04.1 THYROID NODULE: ICD-10-CM

## 2024-03-22 DIAGNOSIS — K21.9 GERD WITHOUT ESOPHAGITIS: ICD-10-CM

## 2024-03-22 DIAGNOSIS — F41.9 ANXIETY: ICD-10-CM

## 2024-03-22 DIAGNOSIS — E78.2 MIXED HYPERLIPIDEMIA: ICD-10-CM

## 2024-03-22 PROBLEM — E66.01 MORBID OBESITY: Status: RESOLVED | Noted: 2023-03-09 | Resolved: 2024-03-22

## 2024-03-22 PROCEDURE — 4010F ACE/ARB THERAPY RXD/TAKEN: CPT | Mod: CPTII,S$GLB,, | Performed by: INTERNAL MEDICINE

## 2024-03-22 PROCEDURE — 3044F HG A1C LEVEL LT 7.0%: CPT | Mod: CPTII,S$GLB,, | Performed by: INTERNAL MEDICINE

## 2024-03-22 PROCEDURE — 1159F MED LIST DOCD IN RCRD: CPT | Mod: CPTII,S$GLB,, | Performed by: INTERNAL MEDICINE

## 2024-03-22 PROCEDURE — 99999 PR PBB SHADOW E&M-EST. PATIENT-LVL III: CPT | Mod: PBBFAC,,, | Performed by: INTERNAL MEDICINE

## 2024-03-22 PROCEDURE — 3079F DIAST BP 80-89 MM HG: CPT | Mod: CPTII,S$GLB,, | Performed by: INTERNAL MEDICINE

## 2024-03-22 PROCEDURE — 3008F BODY MASS INDEX DOCD: CPT | Mod: CPTII,S$GLB,, | Performed by: INTERNAL MEDICINE

## 2024-03-22 PROCEDURE — 99214 OFFICE O/P EST MOD 30 MIN: CPT | Mod: S$GLB,,, | Performed by: INTERNAL MEDICINE

## 2024-03-22 PROCEDURE — 3074F SYST BP LT 130 MM HG: CPT | Mod: CPTII,S$GLB,, | Performed by: INTERNAL MEDICINE

## 2024-03-22 RX ORDER — TRIAMTERENE AND HYDROCHLOROTHIAZIDE 75; 50 MG/1; MG/1
1 TABLET ORAL DAILY
Qty: 90 TABLET | Refills: 2 | Status: SHIPPED | OUTPATIENT
Start: 2024-03-22 | End: 2025-03-22

## 2024-03-22 RX ORDER — LOSARTAN POTASSIUM 50 MG/1
50 TABLET ORAL DAILY
Qty: 90 TABLET | Refills: 3 | Status: SHIPPED | OUTPATIENT
Start: 2024-03-22

## 2024-03-22 NOTE — PROGRESS NOTES
[facility] Note    Chief Complaint      Chief Complaint   Patient presents with    Follow-up     6 month       History of Present Illness      Nell Sanches is a 53 y.o. female with chronic conditions of HTN, HLD, anxiety, thyroid nodule, vit d deficiency  who presents today for: follow up chronic conditions.  Has discontinued ozempic for weight loss. A1C actually increased while on ozempic.  Has been on keto diet which has improved slightly.    Scheduled for EGD/cscope with Dr. Ponce.    Had hysterectomy due to fibroids with Dr. Sepulveda.  Still with occasional right lower quadrant discomfort.    Reports noticing fluid retention.  Has been trying to passively avoid high sodium diet.  HTN: BP at goal on losartan.  GERD: has discontinued prilosec and famotidine without significant increase in symptoms.  Has occasional reflux.  Obesity: See above.    Flu shto discussed.  TdAP .  COVID vaccine UTD.    Mammogram .  DEXA due age 64.  Cscope 2019, Dr. Salgado, no polyp, 10 yr interval.  Scheduled for repeat with Dr. Ponce.     Past Medical History:  Past Medical History:   Diagnosis Date    Abnormal weight gain 10/02/2012    Achilles tendonitis     left    Arthritis     Digestive disorder     Fibroids     Gastro-esophageal reflux disease without esophagitis     Hypertension     Other and unspecified hyperlipidemia 10/02/2012    Unspecified vitamin D deficiency 10/02/2012       Past Surgical History:   has a past surgical history that includes Breast biopsy (Right, );  section; Cholecystectomy; Colonoscopy; Hysteroscopy with dilation and curettage of uterus; Hip Arthroplasty (Right, 10/25/2022); xi robotic hysterectomy, with salpingectomy (Bilateral, 10/30/2023); Lysis of adhesions of ureter (Right, 10/30/2023); robotic ablation or excision, endometriosis (N/A, 10/30/2023); and Gallbladder surgery (2015).    Family History:  family history includes Colon cancer (age  of onset: 70) in her maternal grandmother.     Social History:  Social History     Tobacco Use    Smoking status: Never    Smokeless tobacco: Never   Substance Use Topics    Alcohol use: Yes     Comment: rare    Drug use: Never       I personally reviewed all past medical, surgical, social and family history.    Review of Systems   Constitutional:  Negative for chills, fever and malaise/fatigue.   Respiratory:  Negative for shortness of breath.    Cardiovascular:  Negative for chest pain.   Gastrointestinal:  Negative for constipation, diarrhea, nausea and vomiting.   Skin:  Negative for rash.   Neurological:  Negative for weakness.   All other systems reviewed and are negative.       Medications:  Outpatient Encounter Medications as of 3/22/2024   Medication Sig Note Dispense Refill    calcium carb/vitamin D3/vit K1 (SOFT CHEWS CALCIUM ORAL) Take by mouth.       calcium carbonate/vitamin D3 (VITAMIN D-3 ORAL) Take by mouth Daily.       cephALEXin (KEFLEX) 500 MG capsule SMARTSI Capsule(s) By Mouth 10/24/2023: For dental procedures      fluticasone propionate (FLONASE) 50 mcg/actuation nasal spray 2 sprays (100 mcg total) by Each Nostril route once daily.  16 g 5    glucosamine-chondroitin 500-400 mg tablet Take 1 tablet by mouth 3 (three) times daily.       losartan (COZAAR) 50 MG tablet Take 1 tablet (50 mg total) by mouth once daily.  90 tablet 3    multivitamin capsule 1 cap(s)       OZEMPIC 1 mg/dose (4 mg/3 mL) Inject 1 mg into the skin every 7 days. (Patient not taking: Reported on 3/22/2024)  9 mL 3    triamterene-hydrochlorothiazide 75-50 mg (MAXZIDE) 75-50 mg per tablet Take 1 tablet by mouth once daily.  90 tablet 2    TUMERIC-GING-OLIVE-OREG-CAPRYL ORAL Take by mouth.       UBIQUINOL, BULK, MISC by Misc.(Non-Drug; Combo Route) route.       [DISCONTINUED] losartan (COZAAR) 50 MG tablet TAKE 1 TABLET(50 MG) BY MOUTH EVERY DAY  90 tablet 3    [DISCONTINUED] triamterene-hydrochlorothiazide 37.5-25 mg  (MAXZIDE-25) 37.5-25 mg per tablet TAKE 1 TABLET BY MOUTH EVERY DAY  90 tablet 3     Facility-Administered Encounter Medications as of 3/22/2024   Medication Dose Route Frequency Provider Last Rate Last Admin    0.9%  NaCl infusion   Intravenous Continuous Gil Paulino MD           Allergies:  Review of patient's allergies indicates:   Allergen Reactions    Lisinopril      Other reaction(s): cough    Sulfa (sulfonamide antibiotics) Hives       Health Maintenance:  Immunization History   Administered Date(s) Administered    COVID-19 MRNA, LN-S PF (MODERNA HALF 0.25 ML DOSE) 12/17/2021    COVID-19, MRNA, LN-S, PF (MODERNA FULL 0.5 ML DOSE) 01/22/2021, 02/20/2021    Influenza 10/04/2012    Influenza - Quadrivalent - MDCK - PF 10/10/2018    Influenza - Quadrivalent - PF *Preferred* (6 months and older) 12/06/2017, 10/04/2019, 10/08/2020, 11/30/2022    Influenza - Trivalent - PF (ADULT) 10/04/2012    Influenza Split 12/06/2017    Tdap 10/04/2019      Health Maintenance   Topic Date Due    Hepatitis C Screening  Never done    Shingles Vaccine (1 of 2) Never done    Mammogram  03/17/2024    Colorectal Cancer Screening  04/29/2024    Lipid Panel  03/09/2025    TETANUS VACCINE  10/04/2029        Physical Exam      Vital Signs  Pulse: 78  SpO2: 99 %  BP: 110/80  BP Location: Left arm  Patient Position: Sitting  Pain Score: 0-No pain  Height and Weight  Height: 6' (182.9 cm)  Weight: 114.3 kg (251 lb 15.8 oz)  BSA (Calculated - sq m): 2.41 sq meters  BMI (Calculated): 34.2  Weight in (lb) to have BMI = 25: 183.9]    Physical Exam  Vitals reviewed.   Constitutional:       Appearance: She is well-developed.   HENT:      Head: Normocephalic and atraumatic.      Right Ear: External ear normal.      Left Ear: External ear normal.   Cardiovascular:      Rate and Rhythm: Normal rate and regular rhythm.      Heart sounds: Normal heart sounds. No murmur heard.  Pulmonary:      Effort: Pulmonary effort is normal.      Breath  sounds: Normal breath sounds. No wheezing or rales.   Abdominal:      General: Bowel sounds are normal. There is no distension.      Palpations: Abdomen is soft.      Tenderness: There is no abdominal tenderness.          Laboratory:  CBC:  Recent Labs   Lab 03/24/23  0832 10/24/23  1500 03/09/24  0815   WBC 6.5 7.57 5.3   RBC 5.01 4.56 5.17 H   Hemoglobin 14.0 12.3 13.4   Hematocrit 42.2 37.7 41.5   Platelets 297 427 360   MCV 84.2 83 80.3   MCH 27.9 27.0 25.9 L   MCHC 33.1 32.6 32.3     CMP:  Recent Labs   Lab 03/24/23  0832 10/24/23  1500 12/29/23  0804 03/09/24  0815   Glucose 94   < > 80 106 H   Calcium 9.6   < > 9.1 9.7   Albumin  --   --   --  4.3   ALBUMIN 4.7  --  4.3  --    Total Protein 7.0  --  7.0 7.0   Sodium 139   < > 138 139   Potassium 4.3   < > 4.1 4.5   CO2 26   < > 29 29   Chloride 101   < > 101 103   BUN 15.0   < > 18.0 17   Alkaline Phosphatase 65  --  70  --    ALT 16  --  32 15   AST 21  --  25 13   Total Bilirubin 0.8  --  0.7 0.6    < > = values in this interval not displayed.     URINALYSIS:  Recent Labs   Lab 10/13/22  1315   Color, UA Yellow   Specific Gravity, UA 1.025   pH, UA 5.0   Protein, UA Negative   Bacteria Rare   Nitrite, UA Negative   Leukocytes, UA Negative   Urobilinogen, UA Negative      LIPIDS:  Recent Labs   Lab 05/13/22  0731 03/24/23  0832 03/09/24  0815   TSH 2.59 2.35 1.81   HDL 56 54 52   Cholesterol 184 184 169   Triglycerides 88 91 82   LDL Cholesterol  --   --  100 H   LDL Calculated 114 115  --    HDL/Cholesterol Ratio  --   --  3.3   Non HDL Chol. (LDL+VLDL)  --   --  117     TSH:  Recent Labs   Lab 05/13/22  0731 03/24/23  0832 03/09/24  0815   TSH 2.59 2.35 1.81     A1C:  Recent Labs   Lab 05/13/22  0733 03/24/23  0832 12/29/23  0804 03/09/24  0815   Hemoglobin A1C 5.0 5.5 6.0 H 5.9 H       Assessment/Plan     Nellbrandy Sanches is a 53 y.o.female with:    1. Essential hypertension  - losartan (COZAAR) 50 MG tablet; Take 1 tablet (50 mg total) by mouth  once daily.  Dispense: 90 tablet; Refill: 3  - triamterene-hydrochlorothiazide 75-50 mg (MAXZIDE) 75-50 mg per tablet; Take 1 tablet by mouth once daily.  Dispense: 90 tablet; Refill: 2  Continue current meds.    2. Mixed hyperlipidemia  - Comprehensive Metabolic Panel; Future  - Lipid Panel; Future  - Comprehensive Metabolic Panel  - Lipid Panel  Cont diet and exercise  3. Impaired fasting glucose  - Hemoglobin A1C; Future  - Hemoglobin A1C  Cont diet and exercise  4. Anxiety  Continue current meds.    5. GERD without esophagitis  - CBC Auto Differential; Future  - CBC Auto Differential  Continue current meds.    6. Thyroid nodule  F/U with endocrinology   7. Venous insufficiency  Continue current meds.      Chronic conditions status updated as per HPI.  Other than changes above, cont current medications and maintain follow up with specialists.  No follow-ups on file.    Future Appointments   Date Time Provider Department Center   9/27/2024  1:45 PM Juanito Cabral MD [facility]       Juanito Cabral MD  [facility]

## 2024-04-03 DIAGNOSIS — Z12.31 OTHER SCREENING MAMMOGRAM: ICD-10-CM

## 2024-04-10 ENCOUNTER — PATIENT MESSAGE (OUTPATIENT)
Dept: ADMINISTRATIVE | Facility: HOSPITAL | Age: 54
End: 2024-04-10
Payer: COMMERCIAL

## 2024-04-25 LAB — CRC RECOMMENDATION EXT: NORMAL

## 2024-04-26 ENCOUNTER — PATIENT OUTREACH (OUTPATIENT)
Dept: ADMINISTRATIVE | Facility: HOSPITAL | Age: 54
End: 2024-04-26
Payer: COMMERCIAL

## 2024-04-26 NOTE — PROGRESS NOTES
Health Maintenance Due   Topic Date Due    Hepatitis C Screening  Never done    HIV Screening  Never done    Shingles Vaccine (1 of 2) Never done    Influenza Vaccine (1) 09/01/2023    COVID-19 Vaccine (4 - 2023-24 season) 09/01/2023    Mammogram  03/17/2024     Chart review completed. HM Updated. Triggered Links. Immunizations reviewed and updated. Care Everywhere Updated. Care Team Updated.  Imported outside colonoscopy results from A into /media.

## 2024-05-10 ENCOUNTER — TELEPHONE (OUTPATIENT)
Dept: ENDOCRINOLOGY | Facility: CLINIC | Age: 54
End: 2024-05-10
Payer: COMMERCIAL

## 2024-05-10 NOTE — TELEPHONE ENCOUNTER
----- Message from Willow Evangelista sent at 5/9/2024  4:54 PM CDT -----  Regarding: appt access  Contact: pt [phone]  Pt calling to schedule an appt for a consult for pre-diabetes. Pls call

## 2024-05-10 NOTE — TELEPHONE ENCOUNTER
Returned patient call to get her an appointment . I did find patient an appointment, but have to check with Supervisor to be sure that date will work . I inform patient I will give her a call back if it doesn't work

## 2024-09-03 ENCOUNTER — HOSPITAL ENCOUNTER (OUTPATIENT)
Dept: RADIOLOGY | Facility: OTHER | Age: 54
Discharge: HOME | End: 2024-09-03
Attending: INTERNAL MEDICINE
Payer: COMMERCIAL

## 2024-09-03 DIAGNOSIS — Z12.31 SCREENING MAMMOGRAM FOR BREAST CANCER: ICD-10-CM

## 2024-09-03 PROCEDURE — 77067 SCR MAMMO BI INCL CAD: CPT | Mod: TC

## 2024-09-10 ENCOUNTER — OFFICE VISIT (OUTPATIENT)
Dept: ENDOCRINOLOGY | Facility: CLINIC | Age: 54
End: 2024-09-10
Payer: COMMERCIAL

## 2024-09-10 VITALS
HEIGHT: 72 IN | HEART RATE: 98 BPM | SYSTOLIC BLOOD PRESSURE: 124 MMHG | OXYGEN SATURATION: 97 % | BODY MASS INDEX: 34.12 KG/M2 | DIASTOLIC BLOOD PRESSURE: 82 MMHG | WEIGHT: 251.88 LBS

## 2024-09-10 DIAGNOSIS — R73.03 PREDIABETES: Primary | ICD-10-CM

## 2024-09-10 PROCEDURE — 99204 OFFICE O/P NEW MOD 45 MIN: CPT | Mod: S$GLB,,, | Performed by: INTERNAL MEDICINE

## 2024-09-10 PROCEDURE — 3008F BODY MASS INDEX DOCD: CPT | Mod: CPTII,S$GLB,, | Performed by: INTERNAL MEDICINE

## 2024-09-10 PROCEDURE — 1159F MED LIST DOCD IN RCRD: CPT | Mod: CPTII,S$GLB,, | Performed by: INTERNAL MEDICINE

## 2024-09-10 PROCEDURE — 3079F DIAST BP 80-89 MM HG: CPT | Mod: CPTII,S$GLB,, | Performed by: INTERNAL MEDICINE

## 2024-09-10 PROCEDURE — 3044F HG A1C LEVEL LT 7.0%: CPT | Mod: CPTII,S$GLB,, | Performed by: INTERNAL MEDICINE

## 2024-09-10 PROCEDURE — G2211 COMPLEX E/M VISIT ADD ON: HCPCS | Mod: S$GLB,,, | Performed by: INTERNAL MEDICINE

## 2024-09-10 PROCEDURE — 3074F SYST BP LT 130 MM HG: CPT | Mod: CPTII,S$GLB,, | Performed by: INTERNAL MEDICINE

## 2024-09-10 PROCEDURE — 4010F ACE/ARB THERAPY RXD/TAKEN: CPT | Mod: CPTII,S$GLB,, | Performed by: INTERNAL MEDICINE

## 2024-09-10 PROCEDURE — 99999 PR PBB SHADOW E&M-EST. PATIENT-LVL III: CPT | Mod: PBBFAC,,, | Performed by: INTERNAL MEDICINE

## 2024-09-10 RX ORDER — PANTOPRAZOLE SODIUM 40 MG/1
TABLET, DELAYED RELEASE ORAL
COMMUNITY
Start: 2024-07-17

## 2024-09-10 NOTE — PROGRESS NOTES
"ENDOCRINOLOGY INITIAL VISIT  09/10/2024    Reason for Visit:  referred by Self, Aaareferral for evaluation of prediabetes, class I obesity    HPI:  Nell Sanches is a 54 y.o. female with hx of prediabetes and class Iobesity, BMI 34.    Diagnosed with prediabetes -   5.7% in   6% 2023  5.9% 3/2024     Started keto diet ("dirty keto") has decreased the amount of carbs but still not enough proteins   Checks her BG regularly  Average 116, lowest 93, highest 133  Weighs herself regularly (260 lbs > 250 lbs)    Ozempic   Used 1 1/2 years   0.25 > 0.5 > 1.0 mg   Eating half portions and felt well. (Mental aspect was very rewarding and empowering)  Has tried multiple diets in the past with some success but unable to maintain.   /physical therapy   Hip replaced - arthritis     Works:  - supervisor contractor/building    Review of patient's allergies indicates:   Allergen Reactions    Lisinopril      Other reaction(s): cough    Sulfa (sulfonamide antibiotics) Hives       Current Outpatient Medications:     cephALEXin (KEFLEX) 500 MG capsule, SMARTSI Capsule(s) By Mouth, Disp: , Rfl:     fluticasone propionate (FLONASE) 50 mcg/actuation nasal spray, 2 sprays (100 mcg total) by Each Nostril route once daily., Disp: 16 g, Rfl: 5    losartan (COZAAR) 50 MG tablet, Take 1 tablet (50 mg total) by mouth once daily., Disp: 90 tablet, Rfl: 3    pantoprazole (PROTONIX) 40 MG tablet, TAKE 1 TABLET BY MOUTH DAILY 30 MINUTES BEFORE BREAKFAST, Disp: , Rfl:     triamterene-hydrochlorothiazide 75-50 mg (MAXZIDE) 75-50 mg per tablet, Take 1 tablet by mouth once daily., Disp: 90 tablet, Rfl: 2  No current facility-administered medications for this visit.    Facility-Administered Medications Ordered in Other Visits:     0.9%  NaCl infusion, , Intravenous, Continuous, Gil Paulino MD      ROS: see HPI     Family history: T2DM - mother    PHYSICAL EXAM  /82 (BP Location: Right arm, " "Patient Position: Sitting, BP Method: Large (Manual))   Pulse 98   Ht 6' (1.829 m)   Wt 114.3 kg (251 lb 14 oz)   LMP  (LMP Unknown)   SpO2 97%   BMI 34.16 kg/m²   Wt Readings from Last 3 Encounters:   09/10/24 114.3 kg (251 lb 14 oz)   03/22/24 114.3 kg (251 lb 15.8 oz)   12/11/23 116.9 kg (257 lb 11.5 oz)   ]      ASSESSMENT/PLAN    Problem List Items Addressed This Visit          1 - High    Prediabetes - Primary     Risk factors: BMI 34, sedentary, family history   Has lost 10 lbs since starting "dirty keto" diet     PLAN:  Reviewed possible medications that may help - defers for now   Has A1C scheduled with PCP - will notify me once complete.   If A1C >6.5% consider GLP 1 RA for treatment of weight loss and diabetes            2     BMI 34.0-34.9,adult     BMI consistent with class I obesity  Discussed risks associated with high BMI   Goal weight loss between 5 - 10% of total body weight in the next three months   Dietary modification strategies that include lower carbohydrate meals, intermittent fasting, Mediterranean diet may be of greater benefit. But consistency is important to any dietary change.    Exercise regularly but for weight loss, dietary modifications are recommended.   Can consider weight loss medications (specifically zepbound/wegovy - not covered. No success with semaglutide)   May need bariatric medicine consultation.       Provided instructions in AVS              RTC as needed    Kenya Marti MD    "

## 2024-09-10 NOTE — PATIENT INSTRUCTIONS
Goal weight loss 5% body weight ( 12 lbs)  Weigh yourself daily  Simple plan: avoid anything made with flour or sugar.   Increase vegetables, lean proteins and limit carbohydrates.     Choose high fiber carbohydrates, that is a carbohydrate that has more than 3 - 5 grams of fiber per serving. Examples of starchy foods that are good for you are beans, squash.    Drink plenty of water and avoid drinks with sugar such as regular sodas.     No snacking     Nutrition plan:  1) lower carbohydrate plans   2) intermittent fasting   3) Mediterranean diet    Book:   How to lose weight for the last time, Dr. Amanda Tucker     Prediabetes:   Metformin 500 ER one tablet twice a day  Januvia 100 mg daily   Victoza 0.6 mg daily

## 2024-09-10 NOTE — ASSESSMENT & PLAN NOTE
BMI consistent with class I obesity  Discussed risks associated with high BMI   Goal weight loss between 5 - 10% of total body weight in the next three months   Dietary modification strategies that include lower carbohydrate meals, intermittent fasting, Mediterranean diet may be of greater benefit. But consistency is important to any dietary change.    Exercise regularly but for weight loss, dietary modifications are recommended.   Can consider weight loss medications (specifically zepbound/wegovy - not covered. No success with semaglutide)   May need bariatric medicine consultation.       Provided instructions in AVS

## 2024-09-27 ENCOUNTER — OFFICE VISIT (OUTPATIENT)
Dept: PRIMARY CARE CLINIC | Facility: CLINIC | Age: 54
End: 2024-09-27
Payer: COMMERCIAL

## 2024-09-27 VITALS
WEIGHT: 253.5 LBS | HEART RATE: 81 BPM | BODY MASS INDEX: 34.34 KG/M2 | OXYGEN SATURATION: 98 % | HEIGHT: 72 IN | DIASTOLIC BLOOD PRESSURE: 82 MMHG | SYSTOLIC BLOOD PRESSURE: 110 MMHG

## 2024-09-27 DIAGNOSIS — E78.2 MIXED HYPERLIPIDEMIA: ICD-10-CM

## 2024-09-27 DIAGNOSIS — K21.9 GERD WITHOUT ESOPHAGITIS: ICD-10-CM

## 2024-09-27 DIAGNOSIS — I10 ESSENTIAL HYPERTENSION: ICD-10-CM

## 2024-09-27 DIAGNOSIS — Z00.00 ANNUAL PHYSICAL EXAM: Primary | ICD-10-CM

## 2024-09-27 DIAGNOSIS — R73.01 IMPAIRED FASTING GLUCOSE: ICD-10-CM

## 2024-09-27 PROCEDURE — 3008F BODY MASS INDEX DOCD: CPT | Mod: CPTII,S$GLB,, | Performed by: INTERNAL MEDICINE

## 2024-09-27 PROCEDURE — 3044F HG A1C LEVEL LT 7.0%: CPT | Mod: CPTII,S$GLB,, | Performed by: INTERNAL MEDICINE

## 2024-09-27 PROCEDURE — 1159F MED LIST DOCD IN RCRD: CPT | Mod: CPTII,S$GLB,, | Performed by: INTERNAL MEDICINE

## 2024-09-27 PROCEDURE — 4010F ACE/ARB THERAPY RXD/TAKEN: CPT | Mod: CPTII,S$GLB,, | Performed by: INTERNAL MEDICINE

## 2024-09-27 PROCEDURE — 3074F SYST BP LT 130 MM HG: CPT | Mod: CPTII,S$GLB,, | Performed by: INTERNAL MEDICINE

## 2024-09-27 PROCEDURE — 99999 PR PBB SHADOW E&M-EST. PATIENT-LVL III: CPT | Mod: PBBFAC,,, | Performed by: INTERNAL MEDICINE

## 2024-09-27 PROCEDURE — 3079F DIAST BP 80-89 MM HG: CPT | Mod: CPTII,S$GLB,, | Performed by: INTERNAL MEDICINE

## 2024-09-27 PROCEDURE — 99396 PREV VISIT EST AGE 40-64: CPT | Mod: S$GLB,,, | Performed by: INTERNAL MEDICINE

## 2024-09-27 RX ORDER — TIRZEPATIDE 2.5 MG/.5ML
2.5 INJECTION, SOLUTION SUBCUTANEOUS
Qty: 2 ML | Refills: 3 | Status: SHIPPED | OUTPATIENT
Start: 2024-09-27

## 2024-09-27 NOTE — PROGRESS NOTES
[facility] Note    Chief Complaint      Chief Complaint   Patient presents with    Follow-up     6 month        History of Present Illness      History of Present Illness    CHIEF COMPLAINT:  Ms. Sanches presents today for follow up.    PREDIABETES AND WEIGHT MANAGEMENT:  She reports a slight increase in her A1C from recent bloodwork and has scheduled an appointment with an endocrinologist. While metformin was suggested, she is not considering it currently. She expresses interest in weight loss medication for prediabetes management. She previously tried Ozempic but faced insurance coverage issues due to lacking a diabetes diagnosis. She is now considering Mounjaro/Zepbound as an alternative and is open to exploring compounded options at a local pharmacy, understanding the potential out-of-pocket costs.    DIET AND BLOOD SUGAR:  She has been following a 'dirty keto' diet since February, not strictly limiting carbohydrates to 20 g per day but significantly different from her previous eating habits. She notes reduced joint irritation since starting this diet. She self-monitors ketone levels and blood sugar, finding the trends motivating. Her fasting glucose levels are generally around 106 mg/dL. She has conducted 155 glucose tests with an average reading of 116, ranging from 93 to 133, with most readings in the 110 range. She notes these are likely random glucose measurements rather than fasting.    CHOLESTEROL:  She reports an increase in LDL and non-HDL cholesterol levels. However, her HDL cholesterol has improved, surpassing the previous year's levels and exceeding the desired threshold of 55.    MEDICATIONS:  She takes a fluid pill for ankle swelling and notes she can tell when she misses a dose as her ankles swell up. Currently, she states her ankles feel good.    URINARY SYMPTOMS:  She reports significantly improved bladder control compared to previous visits.    PREVENTIVE CARE:  She is  considering getting a flu vaccine and has been informed that now is a good time to receive it, with availability in the pharmacy downstairs.      ROS:  Genitourinary: -urinary incontinence  Musculoskeletal: -joint pain       HTN: BP at goal on losartan.  HLD: The 10-year ASCVD risk score (Jimbo HATHAWAY, et al., 2019) is: 1.8%    Values used to calculate the score:      Age: 54 years      Sex: Female      Is Non- : No      Diabetic: No      Tobacco smoker: No      Systolic Blood Pressure: 110 mmHg      Is BP treated: Yes      HDL Cholesterol: 58 mg/dL      Total Cholesterol: 188 mg/dL   GERD: has discontinued prilosec and famotidine without significant increase in symptoms.  Has occasional reflux.  Obesity: Has tried ozempic, but had tolerability GI issues.  Interested in trying tirzepatide.  Flu shto discussed.  TdAP 2019.  COVID vaccine UTD.    Mammogram 2024.  DEXA due age 64.  Cscope 2019, Dr. Salgado, no polyp, 10 yr interval.  Scheduled for repeat with Dr. Ponce.     Assessment/Plan     Nell Sanches is a 54 y.o.female with:    Assessment & Plan    Assessed patient's prediabetic status based on recent A1C results and self-monitored glucose readings  Considered weight loss medication options to address insulin resistance and prediabetes, focusing on alternatives to semaglutide-based drugs due to patient's previous experience  Evaluated lipid panel results, noting protective HDL increase and LDL within safe range despite minor elevation  Reviewed patient's keto diet impact on joint irritation and overall health  Interpreted RBC production markers, determining slightly elevated levels as non-concerning    PREDIABETES/INSULIN RESISTANCE:  - Explained the relationship between excess adipose tissue and insulin resistance/prediabetes.  - Discussed interpretation of glucose levels: normal (<100), prediabetes (110-125), and diabetes (>126).  - Advised on the importance of distinguishing  between fasting and random glucose measurements for accurate interpretation.  - Explained the significance of A1C as a 3-month average of glucose levels.  - Recommend  glucose measurements into fasting and random categories for more accurate tracking.    WEIGHT MANAGEMENT:  - Ms. Sanches to continue current keto diet approach.  - Discussed potential prescription of Zepbound for weight loss, to be obtained through [facility] in [place] as a compounded medication if insurance coverage is not available.    ANEMIA:  - Provided context for interpreting RBC production markers and their clinical significance.    FLU VACCINATION:  - Ms. Sanches to obtain flu vaccine at the pharmacy downstairs.    FOLLOW UP:  - Follow up in 6 months to 1 year.         1. Annual physical exam  - tirzepatide, weight loss, (ZEPBOUND) 2.5 mg/0.5 mL PnIj; Inject 2.5 mg into the skin every 7 days.  Dispense: 2 mL; Refill: 3    2. Essential hypertension    3. Mixed hyperlipidemia  - Comprehensive Metabolic Panel; Future  - Lipid Panel; Future    4. Impaired fasting glucose  - Hemoglobin A1C; Future    5. GERD without esophagitis      Chronic conditions status updated as per HPI.  Other than changes above, cont current medications and maintain follow up with specialists.  No follow-ups on file.    Future Appointments   Date Time Provider Department Center   3/28/2025  1:45 PM Juanito Cabral MD [facility]           Past Medical History:  Past Medical History:   Diagnosis Date    Abnormal weight gain 10/02/2012    Achilles tendonitis     left    Arthritis     Digestive disorder     Fibroids     Gastro-esophageal reflux disease without esophagitis     Hypertension     Other and unspecified hyperlipidemia 10/02/2012    Unspecified vitamin D deficiency 10/02/2012       Past Surgical History:   has a past surgical history that includes Breast biopsy (Right, 2012);  section; Cholecystectomy; Colonoscopy;  Hysteroscopy with dilation and curettage of uterus; Hip Arthroplasty (Right, 10/25/2022); xi robotic hysterectomy, with salpingectomy (Bilateral, 10/30/2023); Lysis of adhesions of ureter (Right, 10/30/2023); robotic ablation or excision, endometriosis (N/A, 10/30/2023); Gallbladder surgery (2015); and Hysterectomy.    Family History:  family history includes Colon cancer (age of onset: 70) in her maternal grandmother.     Social History:  Social History     Tobacco Use    Smoking status: Never    Smokeless tobacco: Never   Substance Use Topics    Alcohol use: Yes     Comment: rare    Drug use: Never       Medications:  Outpatient Encounter Medications as of 2024   Medication Sig Note Dispense Refill    cephALEXin (KEFLEX) 500 MG capsule SMARTSI Capsule(s) By Mouth 10/24/2023: For dental procedures      fluticasone propionate (FLONASE) 50 mcg/actuation nasal spray 2 sprays (100 mcg total) by Each Nostril route once daily.  16 g 5    losartan (COZAAR) 50 MG tablet Take 1 tablet (50 mg total) by mouth once daily.  90 tablet 3    pantoprazole (PROTONIX) 40 MG tablet TAKE 1 TABLET BY MOUTH DAILY 30 MINUTES BEFORE BREAKFAST       tirzepatide, weight loss, (ZEPBOUND) 2.5 mg/0.5 mL PnIj Inject 2.5 mg into the skin every 7 days.  2 mL 3    triamterene-hydrochlorothiazide 75-50 mg (MAXZIDE) 75-50 mg per tablet Take 1 tablet by mouth once daily.  90 tablet 2    [DISCONTINUED] calcium carb/vitamin D3/vit K1 (SOFT CHEWS CALCIUM ORAL) Take by mouth. (Patient not taking: Reported on 9/10/2024)       [DISCONTINUED] calcium carbonate/vitamin D3 (VITAMIN D-3 ORAL) Take by mouth Daily. (Patient not taking: Reported on 9/10/2024)       [DISCONTINUED] glucosamine-chondroitin 500-400 mg tablet Take 1 tablet by mouth 3 (three) times daily.       [DISCONTINUED] multivitamin capsule 1 cap(s) (Patient not taking: Reported on 9/10/2024)       [DISCONTINUED] OZEMPIC 1 mg/dose (4 mg/3 mL) Inject 1 mg into the skin every 7 days.  (Patient not taking: Reported on 9/10/2024)  9 mL 3    [DISCONTINUED] TUMERIC-GING-OLIVE-OREG-CAPRYL ORAL Take by mouth. (Patient not taking: Reported on 9/10/2024)       [DISCONTINUED] UBIQUINOL, BULK, MISC by Misc.(Non-Drug; Combo Route) route. (Patient not taking: Reported on 9/10/2024)        Facility-Administered Encounter Medications as of 9/27/2024   Medication Dose Route Frequency Provider Last Rate Last Admin    0.9%  NaCl infusion   Intravenous Continuous Gil Paulino MD           Allergies:  Review of patient's allergies indicates:   Allergen Reactions    Lisinopril      Other reaction(s): cough    Sulfa (sulfonamide antibiotics) Hives       Health Maintenance:  Immunization History   Administered Date(s) Administered    COVID-19 MRNA, LN-S PF (MODERNA HALF 0.25 ML DOSE) 12/17/2021    COVID-19, MRNA, LN-S, PF (MODERNA FULL 0.5 ML DOSE) 01/22/2021, 02/20/2021    Influenza 10/04/2012    Influenza - Quadrivalent - MDCK - PF 10/10/2018    Influenza - Quadrivalent - PF *Preferred* (6 months and older) 12/06/2017, 10/04/2019, 10/08/2020, 11/30/2022    Influenza - Trivalent - Fluarix, Flulaval, Fluzone, Afluria - PF 10/04/2012    Influenza Split 12/06/2017    Tdap 10/04/2019      Health Maintenance   Topic Date Due    Hepatitis C Screening  Never done    Shingles Vaccine (1 of 2) Never done    Mammogram  09/03/2025    Lipid Panel  09/20/2025    Colorectal Cancer Screening  04/25/2029    TETANUS VACCINE  10/04/2029        Physical Exam      Vital Signs  Pulse: 81  SpO2: 98 %  BP: 110/82  BP Location: Right arm  Patient Position: Sitting  Pain Score: 0-No pain  Height and Weight  Height: 6' (182.9 cm)  Weight: 115 kg (253 lb 8.5 oz)  BSA (Calculated - sq m): 2.42 sq meters  BMI (Calculated): 34.4  Weight in (lb) to have BMI = 25: 183.9]    Physical Exam    General: No acute distress. Well-developed. Well-nourished.  Eyes: EOMI. Sclerae anicteric.  HENT: Normocephalic. Atraumatic. Nares patent. Moist oral  mucosa.  Ears: Bilateral TMs clear. Bilateral EACs clear.  Cardiovascular: Regular rate. Regular rhythm. No murmurs. No rubs. No gallops. Normal S1, S2. Normal heart sounds.  Respiratory: Normal respiratory effort. Clear to auscultation bilaterally. No rales. No rhonchi. No wheezing.  Abdomen: Soft. Non-tender. Non-distended. Normoactive bowel sounds.  Musculoskeletal: No  obvious deformity.  Extremities: No lower extremity edema.  Neurological: Alert & oriented x3. No slurred speech. Normal gait.  Psychiatric: Normal mood. Normal affect. Good insight. Good judgment.  Skin: Warm. Dry. No rash.         Physical Exam    Laboratory:    Results              CBC:  Recent Labs   Lab 10/24/23  1500 03/09/24  0815 09/20/24  0743   WBC 7.57 5.3 6.4   RBC 4.56 5.17 H 5.30 H   Hemoglobin 12.3 13.4 14.2   Hematocrit 37.7 41.5 45.8 H   Platelets 427 360 327   MCV 83 80.3 86.4   MCH 27.0 25.9 L 26.8 L   MCHC 32.6 32.3 31.0 L     CMP:  Recent Labs   Lab 10/24/23  1500 12/29/23  0804 03/09/24  0815 09/20/24  0743   Glucose  --  80 106 H 108 H   Calcium  --  9.1 9.7 9.5   Albumin   < >  --  4.3 4.5   ALBUMIN  --  4.3  --   --    Total Protein   < > 7.0 7.0 7.4   Sodium  --  138 139 139   Potassium  --  4.1 4.5 4.2   CO2  --  29 29 32   Chloride  --  101 103 101   BUN  --  18.0 17 16   Alkaline Phosphatase  --  70  --   --    ALT  --  32 15 20   AST  --  25 13 13   Total Bilirubin  --  0.7 0.6 0.7    < > = values in this interval not displayed.     URINALYSIS:  Recent Labs   Lab 10/13/22  1315   Color, UA Yellow   Specific Gravity, UA 1.025   pH, UA 5.0   Protein, UA Negative   Bacteria Rare   Nitrite, UA Negative   Leukocytes, UA Negative   Urobilinogen, UA Negative      LIPIDS:  Recent Labs   Lab 05/13/22  0731 03/24/23  0832 03/09/24  0815 09/20/24  0743   TSH 2.59 2.35 1.81  --    HDL 56 54 52 58   Cholesterol 184 184 169 188   Triglycerides 88 91 82 127   LDL Cholesterol  --   --  100 H 106 H   LDL Calculated 114 115  --   --     HDL/Cholesterol Ratio  --   --  3.3 3.2   Non HDL Chol. (LDL+VLDL)  --   --  117 130 H     TSH:  Recent Labs   Lab 05/13/22  0731 03/24/23  0832 03/09/24  0815   TSH 2.59 2.35 1.81     A1C:  Recent Labs   Lab 05/13/22  0733 03/24/23  0832 12/29/23  0804 03/09/24  0815 09/20/24  0743   Hemoglobin A1C 5.0 5.5 6.0 H 5.9 H 6.0 H           This note was generated with the assistance of ambient listening technology. Verbal consent was obtained by the patient and accompanying visitor(s) for the recording of patient appointment to facilitate this note. I attest to having reviewed and edited the generated note for accuracy, though some syntax or spelling errors may persist. Please contact the author of this note for any clarification.      Juanito Cabral MD  [facility]

## 2024-11-23 ENCOUNTER — HOSPITAL ENCOUNTER (OUTPATIENT)
Dept: RADIOLOGY | Facility: OTHER | Age: 54
Discharge: HOME | End: 2024-11-23
Attending: OBSTETRICS & GYNECOLOGY
Payer: COMMERCIAL

## 2024-11-23 DIAGNOSIS — R10.32 LLQ PAIN: ICD-10-CM

## 2024-11-23 PROCEDURE — 76830 TRANSVAGINAL US NON-OB: CPT | Mod: 26,,, | Performed by: RADIOLOGY

## 2024-11-23 PROCEDURE — 76830 TRANSVAGINAL US NON-OB: CPT | Mod: TC

## 2024-11-23 PROCEDURE — 76856 US EXAM PELVIC COMPLETE: CPT | Mod: 26,,, | Performed by: RADIOLOGY

## 2025-01-01 ENCOUNTER — PATIENT MESSAGE (OUTPATIENT)
Dept: PRIMARY CARE CLINIC | Facility: CLINIC | Age: 55
End: 2025-01-01
Payer: COMMERCIAL

## 2025-01-01 DIAGNOSIS — I10 ESSENTIAL HYPERTENSION: ICD-10-CM

## 2025-01-02 RX ORDER — TRIAMTERENE AND HYDROCHLOROTHIAZIDE 75; 50 MG/1; MG/1
1 TABLET ORAL DAILY
Qty: 90 TABLET | Refills: 2 | Status: SHIPPED | OUTPATIENT
Start: 2025-01-02 | End: 2026-01-02

## 2025-01-02 NOTE — TELEPHONE ENCOUNTER
Care Due:                  Date            Visit Type   Department     Provider  --------------------------------------------------------------------------------                                EP -                              PRIMARY      [facility] PRIMARY  Last Visit: 09-      CARE ([facility])   CARE           Juanito Camejo                              EP -                              PRIMARY      [facility] PRIMARY  Next Visit: 03-      CARE ([facility])   CARE           Juanito Camejo                                                            Last  Test          Frequency    Reason                     Performed    Due Date  --------------------------------------------------------------------------------    HBA1C.......  6 months...  tirzepatide,.............  09- 03-    [facility] Embedded Care Due Messages. Reference number: [id number].   1/02/2025 9:44:30 AM CST

## 2025-01-04 DIAGNOSIS — I10 ESSENTIAL HYPERTENSION: ICD-10-CM

## 2025-01-04 NOTE — TELEPHONE ENCOUNTER
No care due was identified.  [facility] Embedded Care Due Messages. Reference number: [id number].   1/04/2025 9:19:39 AM CST

## 2025-01-05 RX ORDER — LOSARTAN POTASSIUM 50 MG/1
TABLET ORAL
Qty: 90 TABLET | Refills: 2 | Status: SHIPPED | OUTPATIENT
Start: 2025-01-05

## 2025-01-05 NOTE — TELEPHONE ENCOUNTER
Refill Decision Note   Nell Sanches  is requesting a refill authorization.  Brief Assessment and Rationale for Refill:  Approve     Medication Therapy Plan:         Comments:     Note composed:5:46 PM 01/05/2025

## 2025-02-10 DIAGNOSIS — Z00.00 ANNUAL PHYSICAL EXAM: ICD-10-CM

## 2025-02-11 RX ORDER — TIRZEPATIDE 2.5 MG/.5ML
2.5 INJECTION, SOLUTION SUBCUTANEOUS
Qty: 2 ML | Refills: 3 | Status: SHIPPED | OUTPATIENT
Start: 2025-02-11

## 2025-02-11 NOTE — TELEPHONE ENCOUNTER
No care due was identified.  [facility] Embedded Care Due Messages. Reference number: [id number].   2/10/2025 7:58:31 PM CST

## 2025-03-20 DIAGNOSIS — Z00.00 ANNUAL PHYSICAL EXAM: ICD-10-CM

## 2025-03-20 RX ORDER — TIRZEPATIDE 2.5 MG/.5ML
2.5 INJECTION, SOLUTION SUBCUTANEOUS
Qty: 2 ML | Refills: 3 | Status: CANCELLED | OUTPATIENT
Start: 2025-03-20

## 2025-03-21 ENCOUNTER — PATIENT MESSAGE (OUTPATIENT)
Dept: PRIMARY CARE CLINIC | Facility: CLINIC | Age: 55
End: 2025-03-21
Payer: COMMERCIAL

## 2025-03-25 ENCOUNTER — RESULTS FOLLOW-UP (OUTPATIENT)
Dept: PRIMARY CARE CLINIC | Facility: CLINIC | Age: 55
End: 2025-03-25

## 2025-03-28 ENCOUNTER — OFFICE VISIT (OUTPATIENT)
Dept: PRIMARY CARE CLINIC | Facility: CLINIC | Age: 55
End: 2025-03-28
Payer: COMMERCIAL

## 2025-03-28 VITALS
HEART RATE: 89 BPM | BODY MASS INDEX: 32.94 KG/M2 | DIASTOLIC BLOOD PRESSURE: 70 MMHG | WEIGHT: 243.19 LBS | OXYGEN SATURATION: 97 % | SYSTOLIC BLOOD PRESSURE: 110 MMHG | HEIGHT: 72 IN

## 2025-03-28 DIAGNOSIS — K21.9 GERD WITHOUT ESOPHAGITIS: ICD-10-CM

## 2025-03-28 DIAGNOSIS — I10 ESSENTIAL HYPERTENSION: ICD-10-CM

## 2025-03-28 DIAGNOSIS — E04.1 THYROID NODULE: ICD-10-CM

## 2025-03-28 DIAGNOSIS — E78.2 MIXED HYPERLIPIDEMIA: ICD-10-CM

## 2025-03-28 DIAGNOSIS — F41.9 ANXIETY: ICD-10-CM

## 2025-03-28 DIAGNOSIS — R73.03 PREDIABETES: ICD-10-CM

## 2025-03-28 DIAGNOSIS — Z00.00 ANNUAL PHYSICAL EXAM: ICD-10-CM

## 2025-03-28 DIAGNOSIS — I87.2 VENOUS INSUFFICIENCY: ICD-10-CM

## 2025-03-28 DIAGNOSIS — E66.9 OBESITY, UNSPECIFIED CLASS, UNSPECIFIED OBESITY TYPE, UNSPECIFIED WHETHER SERIOUS COMORBIDITY PRESENT: Primary | ICD-10-CM

## 2025-03-28 PROCEDURE — 4010F ACE/ARB THERAPY RXD/TAKEN: CPT | Mod: CPTII,S$GLB,, | Performed by: INTERNAL MEDICINE

## 2025-03-28 PROCEDURE — 99214 OFFICE O/P EST MOD 30 MIN: CPT | Mod: S$GLB,,, | Performed by: INTERNAL MEDICINE

## 2025-03-28 PROCEDURE — 3074F SYST BP LT 130 MM HG: CPT | Mod: CPTII,S$GLB,, | Performed by: INTERNAL MEDICINE

## 2025-03-28 PROCEDURE — 99999 PR PBB SHADOW E&M-EST. PATIENT-LVL III: CPT | Mod: PBBFAC,,, | Performed by: INTERNAL MEDICINE

## 2025-03-28 PROCEDURE — 1159F MED LIST DOCD IN RCRD: CPT | Mod: CPTII,S$GLB,, | Performed by: INTERNAL MEDICINE

## 2025-03-28 PROCEDURE — 3078F DIAST BP <80 MM HG: CPT | Mod: CPTII,S$GLB,, | Performed by: INTERNAL MEDICINE

## 2025-03-28 PROCEDURE — 3044F HG A1C LEVEL LT 7.0%: CPT | Mod: CPTII,S$GLB,, | Performed by: INTERNAL MEDICINE

## 2025-03-28 PROCEDURE — 3008F BODY MASS INDEX DOCD: CPT | Mod: CPTII,S$GLB,, | Performed by: INTERNAL MEDICINE

## 2025-03-28 RX ORDER — TIRZEPATIDE 5 MG/.5ML
5 INJECTION, SOLUTION SUBCUTANEOUS
Qty: 2 ML | Refills: 3 | Status: SHIPPED | OUTPATIENT
Start: 2025-03-28 | End: 2025-04-01

## 2025-03-28 NOTE — PROGRESS NOTES
[facility]    Chief Complaint      Chief Complaint   Patient presents with    Follow-up       History of Present Illness      History of Present Illness    CHIEF COMPLAINT:  Ms. Sanches presents today for follow up regarding medication management.    DIABETES MANAGEMENT:  She reports improvement in A1C to 5.6 while on Zepbound. She prefers to maintain the current 2.5mg dose, expressing a desire for gradual progress.     MUSCULOSKELETAL AND NEUROLOGICAL SYMPTOMS:  She reports leg numbness from knee down while sleeping, taking 1-2 hours to resolve with sensation gradually returning to toes. She attributes this to arthritic hip joint. She experiences muscle tightness and painful knots in hip area. She denies pain with normal ambulation but expresses concern about potential nerve compression or circulatory issues during sleep.    RECENT DENTAL INFECTION:  She developed infection following dental cleaning. Initial treatment with antibiotics and steroid injection was ineffective for severe sore throat. Symptoms improved after second course of antibiotics, another steroid injection, and steroid pack.    PREVENTIVE CARE:  Last flu vaccine was in 2022. Mammogram is current until September. Colonoscopy with Dr. Ponce last year, next screening due 2029.      ROS:  ENT: +sore throat  Musculoskeletal: +muscle pain, +muscle tension  Neurological: +numbness, +decreased sensation in extremities       HTN: BP at goal on losartan.  HLD:   The 10-year ASCVD risk score (Jimbo HATHAWAY, et al., 2019) is: 1.9%    Values used to calculate the score:      Age: 54 years      Sex: Female      Is Non- : No      Diabetic: No      Tobacco smoker: No      Systolic Blood Pressure: 110 mmHg      Is BP treated: Yes      HDL Cholesterol: 51 mg/dL      Total Cholesterol: 179 mg/dL   GERD: Controlled on pantoprazole  Obesity: Was taking tirzepatide and doing well but medication unable to be compounded  anymore.  Will try through [facility].  Flu shot discussed.  TdAP 2019.  COVID vaccine UTD.    Mammogram 2024.  DEXA due age 64.  Cscope 2024, Dr. Ponce, no polyp, 5 yr interval.      Assessment/Plan     Nell Sanches is a 54 y.o.female with:    Assessment & Plan    A1C improved to 5.6, indicating effectiveness of semaglutide.  Considered options for continuing semaglutide treatment due to insurance denial, including direct purchase from . Continued semaglutide 2.5 mg dose and started process to obtain through [employer]-pay program, initially prescribing 5 mg vial with instructions to use 2.5 mg dose.  Discussed dosage options and cost considerations for semaglutide, weighing benefits of different vial strengths.  Assessed leg numbness and hip pain, considering potential causes such as arthritis, IT band tightness, and piriformis muscle issues.  Reviewed preventive care status, including flu vaccine, tetanus, mammogram, pap smear, and colon cancer screening.    TYPE 2 DIABETES MELLITUS:  - Discussed the long-term safety profile of GLP-1 receptor agonists like semaglutide, noting their use in diabetes treatment since 2012.  - Evaluated that the patient's A1C has decreased to 5.6, indicating improvement in glucose control.  - Assessed that semaglutide is working effectively to control glucose levels.  - Continued semaglutide 2.5 mg dose and initiated process to obtain through [employer] program.  - Prescribed 5 mg vial of semaglutide with instructions to use 2.5 mg dose.  - Instructed the patient to contact the office if deciding to increase semaglutide dose to 5 mg.    HIP PAIN AND OSTEOARTHRITIS:  - Explained different types of hip pain: anterior groin pain typically indicates hip arthritis.  - Evaluated that the hip pain is likely not from arthritis, as arthritis pain is typically in the anterior groin area.  - Assessed that the patient's pain is more  consistent with bursitis or piriformis issues.  - Explained different types of hip pain: lateral pain suggests bursitis, and posterior pain often relates to gluteal or piriformis issues.  - Noted that the patient reports muscle tightness and pain in hip area, particularly noticeable when pressing on the area or during sleep.  - Noted that the patient reports ongoing issues with muscle tightness and pain in hip area, particularly noticeable when pressing on the area or during sleep.  - Acknowledged the chronic nature of the pain and suggested it might be related to tight muscles squeezing nerves.    PARESTHESIA AND LEG NUMBNESS:  - Noted that the patient experienced numbness in leg from knee down while sleeping, taking 1-2 hours to regain normal feeling.  - Assessed that the issue might be related to tight muscles squeezing nerves.  - Acknowledged the patient's self-treatment by massaging problem areas to improve blood supply and loosen muscles.    MYALGIA AND MUSCLE TIGHTNESS:  - Recommend stretching exercises, particularly for the IT band and piriformis muscle.  - Advised the patient to obtain specific exercises from Dr. Paulino or continue with Pilates.  - Recommend stretching exercises, particularly for the IT band and piriformis muscle.  - Suggested continuing with Pilates for stretching and strengthening.  - Discussed the impo  rtance of stretching the IT band to address tightness of ligaments or tendons.  - Recommend additional stretching exercises for IT band as tolerated.  - Noted that the patient reports doing exercises for naproxen and attending Pilates classes for stretching and strengthening.  - Recommend ongoing stretching exercises, particularly for the IT band and piriformis muscle.  - Suggested continuing with Pilates for stretching and strengthening.  - MsMarifer Sanches to continue current stretching exercises, including Pilates, to address hip and leg tightness.  - Recommend additional stretching  exercises for piriformis muscle as tolerated.    DENTAL INFECTION:  - Noted that the patient reports having had a dental infection, likely acquired at the dentist's office, resulting in a severe sore throat.  - Documented that the patient was treated with multiple rounds of antibiotics, steroid injections, and a steroid pack.  - Prescribed pantoprazole and noted patient reports improvement.    FOLLOW-UP:  - Recommend follow-up with Dr. Paulino next week for further evaluation, including potential X-ray and MRI.  - Recommend follow-up with Dr. Paulino next week regarding leg numbness.         1. Obesity, unspecified class, unspecified obesity type, unspecified whether serious comorbidity present  - tirzepatide, weight loss, (ZEPBOUND) 5 mg/0.5 mL PnIj; Inject 5 mg into the skin every 7 days.  Dispense: 2 mL; Refill: 3    2. Essential hypertension  - tirzepatide, weight loss, (ZEPBOUND) 5 mg/0.5 mL PnIj; Inject 5 mg into the skin every 7 days.  Dispense: 2 mL; Refill: 3    3. Prediabetes  - tirzepatide, weight loss, (ZEPBOUND) 5 mg/0.5 mL PnIj; Inject 5 mg into the skin every 7 days.  Dispense: 2 mL; Refill: 3  - Hemoglobin A1C; Future    4. Mixed hyperlipidemia    5. GERD without esophagitis    6. Anxiety    7. Thyroid nodule    8. Venous insufficiency    9. Annual physical exam  - CBC Auto Differential; Future  - Comprehensive Metabolic Panel; Future  - Hemoglobin A1C; Future  - Lipid Panel; Future  - TSH; Future      Chronic conditions status updated as per HPI.  Other than changes above, cont current medications and maintain follow up with specialists.  Follow up in about 6 months (around 9/28/2025) for Annual preventative visit.    No future appointments.        Past Medical History:  Past Medical History:   Diagnosis Date    Abnormal weight gain 10/02/2012    Achilles tendonitis     left    Arthritis     Digestive disorder     Fibroids     Gastro-esophageal reflux disease without esophagitis     Hypertension      Other and unspecified hyperlipidemia 10/02/2012    Unspecified vitamin D deficiency 10/02/2012       Past Surgical History:   has a past surgical history that includes Breast biopsy (Right, );  section; Cholecystectomy; Colonoscopy; Hysteroscopy with dilation and curettage of uterus; Hip Arthroplasty (Right, 10/25/2022); xi robotic hysterectomy, with salpingectomy (Bilateral, 10/30/2023); Lysis of adhesions of ureter (Right, 10/30/2023); robotic ablation or excision, endometriosis (N/A, 10/30/2023); Gallbladder surgery (2015); and Hysterectomy.    Family History:  family history includes Colon cancer (age of onset: 70) in her maternal grandmother.     Social History:  Social History[1]    Medications:  Encounter Medications[2]    Allergies:  Review of patient's allergies indicates:   Allergen Reactions    Lisinopril      Other reaction(s): cough    Sulfa (sulfonamide antibiotics) Hives       Health Maintenance:  Immunization History   Administered Date(s) Administered    COVID-19 MRNA, LN-S PF (MODERNA HALF 0.25 ML DOSE) 2021    COVID-19, MRNA, LN-S, PF (MODERNA FULL 0.5 ML DOSE) 2021, 2021    Influenza 10/04/2012    Influenza - Quadrivalent - MDCK - PF 10/10/2018    Influenza - Quadrivalent - PF *Preferred* (6 months and older) 2017, 10/04/2019, 10/08/2020, 2022    Influenza - Trivalent - Fluarix, Flulaval, Fluzone, Afluria - PF 10/04/2012    Influenza Split 2017    Tdap 10/04/2019      Health Maintenance   Topic Date Due    Hepatitis C Screening  Never done    HIV Screening  Never done    Shingles Vaccine (1 of 2) Never done    Pneumococcal Vaccines (Age 50+) (1 of 1 - PCV) Never done    Influenza Vaccine (1) 2024    COVID-19 Vaccine (4 -  season) 2024    Mammogram  2025    Hemoglobin A1c (Prediabetes)  2026    Lipid Panel  2026    Colorectal Cancer Screening  2029    TETANUS VACCINE  10/04/2029    RSV Vaccine (Age 60+  and Pregnant patients) (1 - 1-dose 75+ series) 09/09/2044        Physical Exam      Vital Signs  Pulse: 89  SpO2: 97 %  BP: 110/70  BP Location: Right arm  Patient Position: Sitting  Pain Score: 0-No pain  Height and Weight  Height: 6' (182.9 cm)  Weight: 110.3 kg (243 lb 2.7 oz)  BSA (Calculated - sq m): 2.37 sq meters  BMI (Calculated): 33  Weight in (lb) to have BMI = 25: 183.9]    Physical Exam    General: No acute distress. Well-developed. Well-nourished.  Eyes: EOMI. Sclerae anicteric.  HENT: Normocephalic. Atraumatic. Nares patent. Moist oral mucosa.  Ears: Bilateral TMs clear. Bilateral EACs clear.  Cardiovascular: Regular rate. Regular rhythm. No murmurs. No rubs. No gallops. Normal S1, S2.  Respiratory: Normal respiratory effort. Clear to auscultation bilaterally. No rales. No rhonchi. No wheezing.  Abdomen: Soft. Non-tender. Non-distended. Normoactive bowel sounds.  Musculoskeletal: No  obvious deformity.  Extremities: No lower extremity edema.  Neurological: Alert & oriented x3. No slurred speech. Normal gait.  Psychiatric: Normal mood. Normal affect. Good insight. Good judgment.  Skin: Warm. Dry. No rash.         Physical Exam  Vitals reviewed.   Constitutional:       Appearance: She is well-developed.   HENT:      Head: Normocephalic and atraumatic.      Right Ear: External ear normal.      Left Ear: External ear normal.   Cardiovascular:      Rate and Rhythm: Normal rate and regular rhythm.      Heart sounds: Normal heart sounds. No murmur heard.  Pulmonary:      Effort: Pulmonary effort is normal.      Breath sounds: Normal breath sounds. No wheezing or rales.   Abdominal:      General: Bowel sounds are normal. There is no distension.      Palpations: Abdomen is soft.      Tenderness: There is no abdominal tenderness.         Laboratory:    Results              CBC:  Recent Labs   Lab 10/24/23  1500 03/09/24  0815 09/20/24  0743   WBC 7.57 5.3 6.4   RBC 4.56 5.17 H 5.30 H   Hemoglobin 12.3 13.4 14.2    Hematocrit 37.7 41.5 45.8 H   Platelets 427 360 327   MCV 83 80.3 86.4   MCH 27.0 25.9 L 26.8 L   MCHC 32.6 32.3 31.0 L     CMP:  Recent Labs   Lab 10/24/23  1500 12/29/23  0804 03/09/24  0815 09/20/24  0743 03/24/25  0808   Glucose  --  80 106 H 108 H 89   Calcium  --  9.1 9.7 9.5 9.6   Albumin   < >  --  4.3 4.5 4.4   ALBUMIN  --  4.3  --   --   --    Total Protein   < > 7.0 7.0 7.4 7.2   Sodium  --  138 139 139 138   Potassium  --  4.1 4.5 4.2 4.3   CO2  --  29 29 32 28   Chloride  --  101 103 101 98   BUN  --  18.0 17 16 16   Alkaline Phosphatase  --  70  --   --   --    ALT  --  32 15 20 13   AST  --  25 13 13 14   Total Bilirubin  --  0.7 0.6 0.7 0.6    < > = values in this interval not displayed.     URINALYSIS:  Recent Labs   Lab 10/13/22  1315   Color, UA Yellow   Specific Gravity, UA 1.025   pH, UA 5.0   Protein, UA Negative   Bacteria Rare   Nitrite, UA Negative   Leukocytes, UA Negative   Urobilinogen, UA Negative      LIPIDS:  Recent Labs   Lab 05/13/22  0731 03/24/23  0832 03/09/24  0815 09/20/24  0743 03/24/25  0808   TSH 2.59 2.35 1.81  --   --    HDL 56 54 52 58 51   Cholesterol 184 184 169 188 179   Triglycerides 88 91 82 127 107   LDL Cholesterol  --   --  100 H 106 H 108 H   LDL Calculated 114 115  --   --   --    HDL/Cholesterol Ratio  --   --  3.3 3.2 3.5   Non HDL Chol. (LDL+VLDL)  --   --  117 130 H 128     TSH:  Recent Labs   Lab 05/13/22  0731 03/24/23  0832 03/09/24  0815   TSH 2.59 2.35 1.81     A1C:  Recent Labs   Lab 05/13/22  0733 03/24/23  0832 12/29/23  0804 03/09/24  0815 09/20/24  0743 03/24/25  0808   Hemoglobin A1C 5.0 5.5 6.0 H 5.9 H 6.0 H 5.6           This note was generated with the assistance of ambient listening technology. Verbal consent was obtained by the patient and accompanying visitor(s) for the recording of patient appointment to facilitate this note. I attest to having reviewed and edited the generated note for accuracy, though some syntax or spelling errors may  persist. Please contact the author of this note for any clarification.      Juanito Cabral MD  [facility]                       [1]   Social History  Tobacco Use    Smoking status: Never    Smokeless tobacco: Never   Substance Use Topics    Alcohol use: Yes     Comment: rare    Drug use: Never   [2]   Outpatient Encounter Medications as of 3/28/2025   Medication Sig Note Dispense Refill    cephALEXin (KEFLEX) 500 MG capsule SMARTSI Capsule(s) By Mouth 10/24/2023: For dental procedures      fluticasone propionate (FLONASE) 50 mcg/actuation nasal spray 2 sprays (100 mcg total) by Each Nostril route once daily.  16 g 5    losartan (COZAAR) 50 MG tablet TAKE 1 TABLET(50 MG) BY MOUTH DAILY  90 tablet 2    pantoprazole (PROTONIX) 40 MG tablet TAKE 1 TABLET BY MOUTH DAILY 30 MINUTES BEFORE BREAKFAST       tirzepatide, weight loss, (ZEPBOUND) 5 mg/0.5 mL PnIj Inject 5 mg into the skin every 7 days.  2 mL 3    triamterene-hydrochlorothiazide 75-50 mg (MAXZIDE) 75-50 mg per tablet Take 1 tablet by mouth once daily.  90 tablet 2    [DISCONTINUED] tirzepatide, weight loss, (ZEPBOUND) 2.5 mg/0.5 mL PnIj Inject 2.5 mg into the skin every 7 days. (Patient not taking: Reported on 3/28/2025)  2 mL 3     Facility-Administered Encounter Medications as of 3/28/2025   Medication Dose Route Frequency Provider Last Rate Last Admin    0.9%  NaCl infusion   Intravenous Continuous Gil Paulino MD

## 2025-04-01 ENCOUNTER — PATIENT MESSAGE (OUTPATIENT)
Dept: PRIMARY CARE CLINIC | Facility: CLINIC | Age: 55
End: 2025-04-01
Payer: COMMERCIAL

## 2025-04-01 RX ORDER — TIRZEPATIDE 5 MG/.5ML
5 INJECTION, SOLUTION SUBCUTANEOUS
Qty: 2 ML | Refills: 3 | Status: SHIPPED | OUTPATIENT
Start: 2025-04-01

## 2025-04-01 NOTE — TELEPHONE ENCOUNTER
No care due was identified.  [facility] Embedded Care Due Messages. Reference number: [id number].   4/01/2025 11:44:55 AM CDT

## 2025-06-13 ENCOUNTER — PATIENT MESSAGE (OUTPATIENT)
Dept: PRIMARY CARE CLINIC | Facility: CLINIC | Age: 55
End: 2025-06-13
Payer: COMMERCIAL

## 2025-06-15 NOTE — TELEPHONE ENCOUNTER
LOV with Juanito Cabral MD , 3/28/2025 - tirzepatide management  4/1/25 message encounter - increase to 5 mg tirzepatide. Vial  Pt reports she increased tirzepatide to 5 mg two weeks ago. Pt requesting 10 mg tirzepatide.

## 2025-06-16 RX ORDER — TIRZEPATIDE 10 MG/.5ML
10 INJECTION, SOLUTION SUBCUTANEOUS
Qty: 2 ML | Refills: 3 | Status: SHIPPED | OUTPATIENT
Start: 2025-06-16

## 2025-06-16 NOTE — TELEPHONE ENCOUNTER
Pt states she is requesting and increase dose of Zepbound. States she increased her dose to 5mg 2 weeks ago and has 2 weeks left of the 5mg dose. Pt is requesting the 10 mg dose to [place]. Pt states she is tolerating well, denies side effects. Pended.     LOV with Juanito Cabral MD , 3/28/2025

## 2025-06-30 ENCOUNTER — PATIENT MESSAGE (OUTPATIENT)
Dept: ADMINISTRATIVE | Facility: HOSPITAL | Age: 55
End: 2025-06-30
Payer: COMMERCIAL

## (undated) DEVICE — DRESSING N ADH OIL EMUL 3X8

## (undated) DEVICE — SUT VICRYL+ 1 CT1 18IN

## (undated) DEVICE — BLANKET MAXI-THERM PED 25X33IN

## (undated) DEVICE — PAD ABD 8X10 STERILE

## (undated) DEVICE — UNDERGLOVE BIOGEL PI SZ 6.5 LF

## (undated) DEVICE — COVER BACK TBL HD 2-TIER 72IN

## (undated) DEVICE — ELECTRODE REM PLYHSV RETURN 9

## (undated) DEVICE — GLOVE BIOGEL SKINSENSE PI 6.5

## (undated) DEVICE — KIT WING PAD POSITIONING

## (undated) DEVICE — GOWN NONREINF SET-IN SLV XL

## (undated) DEVICE — INSERT CUSHIONPRONE VIEW LARGE

## (undated) DEVICE — SEAL UNIVERSAL 5MM-8MM XI

## (undated) DEVICE — TOWEL OR DISP STRL BLUE 4/PK

## (undated) DEVICE — GOWN SMARTSLEEVE XXL/XLONG

## (undated) DEVICE — Device

## (undated) DEVICE — SYS SEE SHARP SCP ANTIFG LNG

## (undated) DEVICE — ELECTRODE BLADE TEFLON 6

## (undated) DEVICE — GLOVE SENSICARE PI MICRO 7.5

## (undated) DEVICE — TIP RUMI GREEN DISPOSABLE6.7MM

## (undated) DEVICE — SPONGE GAUZE 16PLY 4X4

## (undated) DEVICE — SUT VICRYL PLUS 0 CT1 36IN

## (undated) DEVICE — ELECTRODE NEEDLE 1IN

## (undated) DEVICE — SOL NORMAL USPCA 0.9%

## (undated) DEVICE — SET TRI-LUMEN FILTERED TUBE

## (undated) DEVICE — GLOVE BIOGEL SKINSENSE PI 8.5

## (undated) DEVICE — CLOSURE SKIN STERI STRIP 1/2X4

## (undated) DEVICE — JELLY SURGILUBE 5GR

## (undated) DEVICE — TIP RUMI KOH-EFFICIENT 3.5

## (undated) DEVICE — DRAPE ARM DAVINCI XI

## (undated) DEVICE — OBTURATOR BLADELESS 8MM XI CLR

## (undated) DEVICE — SUT V-LOC ABSRB WOUND CLSR

## (undated) DEVICE — SOL IRRI STRL WATER 1000ML

## (undated) DEVICE — SUT VICRYL 0 27 CT-2

## (undated) DEVICE — STRIP STERI REIN CLSR 1/2X2IN

## (undated) DEVICE — NDL INSUFFLATION VERRES 120MM

## (undated) DEVICE — APPLICATOR CHLORAPREP ORN 26ML

## (undated) DEVICE — DRAPE C ARM 42 X 120 10/BX

## (undated) DEVICE — BLADE SAW SGTL NARROW 0.89

## (undated) DEVICE — DRAPE COLUMN DAVINCI XI

## (undated) DEVICE — SOL ELECTROLUBE ANTI-STIC

## (undated) DEVICE — TRAY FOLEY 16FR INFECTION CONT

## (undated) DEVICE — DRAPE STERI-DRAPE 83X125 IOBAN

## (undated) DEVICE — SOL POVIDONE PREP IODINE 4 OZ

## (undated) DEVICE — SOL POVIDONE SCRUB IODINE 4 OZ

## (undated) DEVICE — DRAPE INCISE IOBAN 2 23X17IN

## (undated) DEVICE — TAPE MEDIPORE 3 X 10YD

## (undated) DEVICE — TIP YANKAUERS BULB NO VENT

## (undated) DEVICE — SUT MCRYL PLUS 4-0 PS2 27IN

## (undated) DEVICE — NDL 18GA X1 1/2 REG BEVEL

## (undated) DEVICE — IRRIGATOR ENDOSCOPY DISP.

## (undated) DEVICE — SUT VICRYL 2-0 8-18 CP-2

## (undated) DEVICE — GLOVE BIOGEL SKINSENSE PI 7.0

## (undated) DEVICE — SPONGE COTTON TRAY 4X4IN

## (undated) DEVICE — SEE L#120831

## (undated) DEVICE — POSITIONER IV ARMBOARD FOAM

## (undated) DEVICE — COVER TIP CURVED SCISSORS XI

## (undated) DEVICE — TAPE SILK 3IN

## (undated) DEVICE — TRAY DO THE ROBOT

## (undated) DEVICE — MASK FLYTE HOOD PEEL AWAY